# Patient Record
Sex: FEMALE | Race: WHITE | NOT HISPANIC OR LATINO | Employment: FULL TIME | URBAN - METROPOLITAN AREA
[De-identification: names, ages, dates, MRNs, and addresses within clinical notes are randomized per-mention and may not be internally consistent; named-entity substitution may affect disease eponyms.]

---

## 2021-12-03 ENCOUNTER — OFFICE VISIT (OUTPATIENT)
Dept: OBGYN CLINIC | Facility: CLINIC | Age: 31
End: 2021-12-03

## 2021-12-03 VITALS
DIASTOLIC BLOOD PRESSURE: 62 MMHG | BODY MASS INDEX: 24.48 KG/M2 | SYSTOLIC BLOOD PRESSURE: 118 MMHG | HEIGHT: 70 IN | WEIGHT: 171 LBS

## 2021-12-03 DIAGNOSIS — Z87.410 HISTORY OF CERVICAL DYSPLASIA: ICD-10-CM

## 2021-12-03 DIAGNOSIS — Z33.1 INCIDENTAL PREGNANCY: Primary | ICD-10-CM

## 2021-12-03 DIAGNOSIS — N91.2 AMENORRHEA: ICD-10-CM

## 2021-12-03 LAB — SL AMB POCT URINE HCG: POSITIVE

## 2021-12-03 PROCEDURE — 81025 URINE PREGNANCY TEST: CPT | Performed by: OBSTETRICS & GYNECOLOGY

## 2021-12-03 PROCEDURE — 99203 OFFICE O/P NEW LOW 30 MIN: CPT | Performed by: OBSTETRICS & GYNECOLOGY

## 2021-12-20 ENCOUNTER — INITIAL PRENATAL (OUTPATIENT)
Dept: OBGYN CLINIC | Facility: CLINIC | Age: 31
End: 2021-12-20
Payer: COMMERCIAL

## 2021-12-20 ENCOUNTER — ULTRASOUND (OUTPATIENT)
Dept: OBGYN CLINIC | Facility: CLINIC | Age: 31
End: 2021-12-20
Payer: COMMERCIAL

## 2021-12-20 DIAGNOSIS — Z36.9 ANTENATAL SCREENING ENCOUNTER: Primary | ICD-10-CM

## 2021-12-20 DIAGNOSIS — Z34.01 ENCOUNTER FOR SUPERVISION OF NORMAL FIRST PREGNANCY IN FIRST TRIMESTER: Primary | ICD-10-CM

## 2021-12-20 PROCEDURE — OBC: Performed by: OBSTETRICS & GYNECOLOGY

## 2021-12-20 PROCEDURE — 76817 TRANSVAGINAL US OBSTETRIC: CPT | Performed by: OBSTETRICS & GYNECOLOGY

## 2021-12-30 ENCOUNTER — LAB (OUTPATIENT)
Dept: LAB | Facility: HOSPITAL | Age: 31
End: 2021-12-30
Attending: OBSTETRICS & GYNECOLOGY
Payer: COMMERCIAL

## 2021-12-30 DIAGNOSIS — N91.2 AMENORRHEA: ICD-10-CM

## 2021-12-30 DIAGNOSIS — Z33.1 INCIDENTAL PREGNANCY: ICD-10-CM

## 2021-12-30 LAB
ABO GROUP BLD: NORMAL
B-HCG SERPL-ACNC: ABNORMAL MIU/ML
BASOPHILS # BLD AUTO: 0.03 THOUSANDS/ΜL (ref 0–0.1)
BASOPHILS NFR BLD AUTO: 0 % (ref 0–1)
BLD GP AB SCN SERPL QL: NEGATIVE
EOSINOPHIL # BLD AUTO: 0.08 THOUSAND/ΜL (ref 0–0.61)
EOSINOPHIL NFR BLD AUTO: 1 % (ref 0–6)
ERYTHROCYTE [DISTWIDTH] IN BLOOD BY AUTOMATED COUNT: 11 % (ref 11.6–15.1)
HCT VFR BLD AUTO: 44.6 % (ref 34.8–46.1)
HGB BLD-MCNC: 14.7 G/DL (ref 11.5–15.4)
IMM GRANULOCYTES # BLD AUTO: 0.01 THOUSAND/UL (ref 0–0.2)
IMM GRANULOCYTES NFR BLD AUTO: 0 % (ref 0–2)
LYMPHOCYTES # BLD AUTO: 2.11 THOUSANDS/ΜL (ref 0.6–4.47)
LYMPHOCYTES NFR BLD AUTO: 23 % (ref 14–44)
MCH RBC QN AUTO: 30 PG (ref 26.8–34.3)
MCHC RBC AUTO-ENTMCNC: 33 G/DL (ref 31.4–37.4)
MCV RBC AUTO: 91 FL (ref 82–98)
MONOCYTES # BLD AUTO: 0.5 THOUSAND/ΜL (ref 0.17–1.22)
MONOCYTES NFR BLD AUTO: 6 % (ref 4–12)
NEUTROPHILS # BLD AUTO: 6.27 THOUSANDS/ΜL (ref 1.85–7.62)
NEUTS SEG NFR BLD AUTO: 70 % (ref 43–75)
NRBC BLD AUTO-RTO: 0 /100 WBCS
PLATELET # BLD AUTO: 272 THOUSANDS/UL (ref 149–390)
PMV BLD AUTO: 10 FL (ref 8.9–12.7)
RBC # BLD AUTO: 4.9 MILLION/UL (ref 3.81–5.12)
RH BLD: NEGATIVE
SPECIMEN EXPIRATION DATE: NORMAL
TSH SERPL DL<=0.05 MIU/L-ACNC: 2.43 UIU/ML (ref 0.36–3.74)
WBC # BLD AUTO: 9 THOUSAND/UL (ref 4.31–10.16)

## 2021-12-30 PROCEDURE — 86803 HEPATITIS C AB TEST: CPT

## 2021-12-30 PROCEDURE — 87086 URINE CULTURE/COLONY COUNT: CPT | Performed by: OBSTETRICS & GYNECOLOGY

## 2021-12-30 PROCEDURE — 84702 CHORIONIC GONADOTROPIN TEST: CPT

## 2021-12-30 PROCEDURE — 80081 OBSTETRIC PANEL INC HIV TSTG: CPT

## 2021-12-30 PROCEDURE — 36415 COLL VENOUS BLD VENIPUNCTURE: CPT

## 2021-12-30 PROCEDURE — 84443 ASSAY THYROID STIM HORMONE: CPT

## 2021-12-31 LAB
HBV SURFACE AG SER QL: NORMAL
HCV AB SER QL: NORMAL
HIV 1+2 AB+HIV1 P24 AG SERPL QL IA: NORMAL
RPR SER QL: NORMAL
RUBV IGG SERPL IA-ACNC: 26.6 IU/ML

## 2022-01-01 LAB — BACTERIA UR CULT: NORMAL

## 2022-01-12 ENCOUNTER — INITIAL PRENATAL (OUTPATIENT)
Dept: OBGYN CLINIC | Facility: CLINIC | Age: 32
End: 2022-01-12

## 2022-01-12 VITALS
SYSTOLIC BLOOD PRESSURE: 118 MMHG | DIASTOLIC BLOOD PRESSURE: 80 MMHG | HEART RATE: 87 BPM | BODY MASS INDEX: 24.82 KG/M2 | WEIGHT: 173 LBS

## 2022-01-12 DIAGNOSIS — Z34.91 FIRST TRIMESTER PREGNANCY: Primary | ICD-10-CM

## 2022-01-12 DIAGNOSIS — Z3A.11 11 WEEKS GESTATION OF PREGNANCY: ICD-10-CM

## 2022-01-12 LAB
SL AMB  POCT GLUCOSE, UA: NORMAL
SL AMB POCT URINE PROTEIN: NORMAL

## 2022-01-12 PROCEDURE — 87491 CHLMYD TRACH DNA AMP PROBE: CPT | Performed by: OBSTETRICS & GYNECOLOGY

## 2022-01-12 PROCEDURE — 87591 N.GONORRHOEAE DNA AMP PROB: CPT | Performed by: OBSTETRICS & GYNECOLOGY

## 2022-01-12 PROCEDURE — G0476 HPV COMBO ASSAY CA SCREEN: HCPCS | Performed by: OBSTETRICS & GYNECOLOGY

## 2022-01-12 PROCEDURE — PNV: Performed by: OBSTETRICS & GYNECOLOGY

## 2022-01-12 PROCEDURE — G0145 SCR C/V CYTO,THINLAYER,RESCR: HCPCS | Performed by: OBSTETRICS & GYNECOLOGY

## 2022-01-12 NOTE — PATIENT INSTRUCTIONS
Pregnancy at [address] to [address] [address]:   What changes are happening in my body? You are now at the end of your first trimester and entering your second trimester  Morning sickness usually goes away by this time  You may have other symptoms such as fatigue, frequent urination, and headaches  You may have gained 2 to 4 pounds by now  How do I care for myself at this stage of my pregnancy? · Get plenty of rest   You may feel more tired than normal  You may need to take naps or go to bed earlier  · Manage nausea and vomiting  Avoid fatty and spicy foods  Eat small meals throughout the day instead of large meals  Della may help to decrease nausea  Ask your healthcare provider about other ways of decreasing nausea and vomiting  · Eat a variety of healthy foods  Healthy foods include fruits, vegetables, whole-grain breads, low-fat dairy foods, beans, lean meats, and fish  Drink liquids as directed  Ask how much liquid to drink each day and which liquids are best for you  Limit caffeine to less than 200 milligrams each day  Limit your intake of fish to 2 servings each week  Choose fish low in mercury such as canned light tuna, shrimp, salmon, cod, or tilapia  Do not  eat fish high in mercury such as swordfish, tilefish, pro mackerel, and shark  · Take prenatal vitamins as directed  Your need for certain vitamins and minerals, such as folic acid, increases during pregnancy  Prenatal vitamins provide some of the extra vitamins and minerals you need  Prenatal vitamins may also help to decrease the risk of certain birth defects  · Do not smoke  Smoking increases your risk of a miscarriage and other health problems during your pregnancy  Smoking can cause your baby to be born too early or weigh less at birth  Ask your healthcare provider for information if you need help quitting  · Do not drink alcohol  Alcohol passes from your body to your baby through the placenta   It can affect your baby's brain development and cause fetal alcohol syndrome (FAS)  FAS is a group of conditions that causes mental, behavior, and growth problems  · Talk to your healthcare provider before you take any medicines  Many medicines may harm your baby if you take them when you are pregnant  Do not take any medicines, vitamins, herbs, or supplements without first talking to your healthcare provider  Never use illegal or street drugs (such as marijuana or cocaine) while you are pregnant  What are some safety tips during pregnancy? · Avoid hot tubs and saunas  Do not use a hot tub or sauna while you are pregnant, especially during your first trimester  Hot tubs and saunas may raise your baby's temperature and increase the risk of birth defects  · Avoid toxoplasmosis  This is an infection caused by eating raw meat or being around infected cat feces  It can cause birth defects, miscarriages, and other problems  Wash your hands after you touch raw meat  Make sure any meat is well-cooked before you eat it  Avoid raw eggs and unpasteurized milk  Use gloves or ask someone else to clean your cat's litter box while you are pregnant  What changes are happening with my baby? Your baby has fully formed fingernails and toenails  Your baby's heartbeat can now be heard  Ask your healthcare provider if you can listen to your baby's heartbeat  By week 14, your baby is over 4 inches long from the top of the head to the rump (baby's bottom)  Your baby weighs over 3 ounces  What do I need to know about prenatal care? Prenatal care is a series of visits with your healthcare provider throughout your pregnancy  During the first 28 weeks of your pregnancy, you will see your healthcare provider 1 time each month  Prenatal care can help prevent problems during pregnancy and childbirth  Your healthcare provider will check your blood pressure and weight  Your baby's heart rate will also be checked   You may also need the following at some visits:  · A pelvic exam  allows your healthcare provider to see your cervix (the bottom part of your uterus)  Your healthcare provider will use a speculum to open your vagina  He or she will check the size and shape of your uterus  · Blood tests  may be done to check for any of the following:    ? Gestational diabetes or anemia (low iron level)    ? Blood type or Rh factor, or certain birth defects    ? Immunity to certain diseases, such as chickenpox or rubella    ? An infection, such as a sexually transmitted infection, HIV, or hepatitis B    · Hepatitis B  may need to be prevented or treated  Hepatitis B is inflammation of the liver caused by the hepatitis B virus (HBV)  HBV can spread from a mother to her baby during delivery  You will be checked for HBV as early as possible in the first trimester of each pregnancy  You need the test even if you received the hepatitis B vaccine or were tested before  You may need to have an HBV infection treated before you give birth  · Urine tests  may also be done to check for sugar and protein  These can be signs of gestational diabetes or preeclampsia  Urine tests may also be done to check for signs of infection  · A fetal ultrasound  shows pictures of your baby inside your uterus  The pictures are used to check your baby's development, movement, and position  · Genetic disorder screening tests  may be offered to you  These tests check your baby's risk for genetic disorders such as Down syndrome  A screening test includes a blood test and ultrasound  When should I seek immediate care? · You have pain or cramping in your abdomen or low back  · You have heavy vaginal bleeding or clotting  · You pass material that looks like tissue or large clots  Collect the material and bring it with you  When should I call my doctor or obstetrician? · You cannot keep food or drinks down, and you are losing weight      · You have light bleeding  · You have chills or a fever  · You have vaginal itching, burning, or pain  · You have yellow, green, white, or foul-smelling vaginal discharge  · You have pain or burning when you urinate, less urine than usual, or pink or bloody urine  · You have questions or concerns about your condition or care  CARE AGREEMENT:   You have the right to help plan your care  Learn about your health condition and how it may be treated  Discuss treatment options with your healthcare providers to decide what care you want to receive  You always have the right to refuse treatment  The above information is an  only  It is not intended as medical advice for individual conditions or treatments  Talk to your doctor, nurse or pharmacist before following any medical regimen to see if it is safe and effective for you  © Copyright [employer] 2021 Information is for End User's use only and may not be sold, redistributed or otherwise used for commercial purposes   All illustrations and images included in CareNotes® are the copyrighted property of A D A M , Inc  or [address] [place] [address]

## 2022-01-12 NOTE — PROGRESS NOTES
Patient was seen today for 1st prenatal visit  1  1st prenatal visit-faint fetal heart tones were noted  Pap with HPV and GC/chlamydia done today  Status post flu shot previously  Had COVID booster on 1/7/22  Follow-up 4 weeks time prenatal visit or as needed  Has MFM appointment on 1/21/22  2  Previous history of cervical dysplasia-[facility]  Pap with HPV done today with disposition as per findings  3  Rh negative-patient counseled about this  RhoGAM at 28 weeks   is O-positive blood type  4  History of EAB-no current concerns  5  Other-father baby was 10 lb 11 oz at birth  6  Nausea-had some at around 6-8 weeks time, then resolved  About 2 or 3 days ago, had resumption of findings with episode of vomiting  Practical recommendations were reviewed  Vitamin B6 and doxylamine were discussed  Should she have worsening symptoms, to call back  Declines any need for Reglan or Zofran at this time

## 2022-01-16 LAB
HPV HR 12 DNA CVX QL NAA+PROBE: NEGATIVE
HPV16 DNA CVX QL NAA+PROBE: NEGATIVE
HPV18 DNA CVX QL NAA+PROBE: NEGATIVE

## 2022-01-17 LAB
C TRACH DNA SPEC QL NAA+PROBE: NEGATIVE
N GONORRHOEA DNA SPEC QL NAA+PROBE: NEGATIVE

## 2022-01-19 LAB
LAB AP GYN PRIMARY INTERPRETATION: NORMAL
Lab: NORMAL
PATH INTERP SPEC-IMP: NORMAL

## 2022-01-21 ENCOUNTER — APPOINTMENT (OUTPATIENT)
Dept: LAB | Facility: CLINIC | Age: 32
End: 2022-01-21
Payer: COMMERCIAL

## 2022-01-21 ENCOUNTER — ROUTINE PRENATAL (OUTPATIENT)
Dept: PERINATAL CARE | Facility: OTHER | Age: 32
End: 2022-01-21
Payer: COMMERCIAL

## 2022-01-21 VITALS
HEIGHT: 70 IN | SYSTOLIC BLOOD PRESSURE: 116 MMHG | DIASTOLIC BLOOD PRESSURE: 78 MMHG | WEIGHT: 171.2 LBS | HEART RATE: 84 BPM | BODY MASS INDEX: 24.51 KG/M2

## 2022-01-21 DIAGNOSIS — Z36.9 ANTENATAL SCREENING ENCOUNTER: ICD-10-CM

## 2022-01-21 DIAGNOSIS — Z36.9 UNSPECIFIED ANTENATAL SCREENING: ICD-10-CM

## 2022-01-21 DIAGNOSIS — Z33.1 PREGNANT STATE, INCIDENTAL: ICD-10-CM

## 2022-01-21 DIAGNOSIS — Z36.82 ENCOUNTER FOR ANTENATAL SCREENING FOR NUCHAL TRANSLUCENCY: Primary | ICD-10-CM

## 2022-01-21 LAB — MISCELLANEOUS LAB TEST RESULT: NORMAL

## 2022-01-21 PROCEDURE — 76813 OB US NUCHAL MEAS 1 GEST: CPT | Performed by: OBSTETRICS & GYNECOLOGY

## 2022-01-21 PROCEDURE — 36415 COLL VENOUS BLD VENIPUNCTURE: CPT

## 2022-01-21 PROCEDURE — 99242 OFF/OP CONSLTJ NEW/EST SF 20: CPT | Performed by: OBSTETRICS & GYNECOLOGY

## 2022-01-21 NOTE — PROGRESS NOTES
Patient chose to have [address] screen  Instructed patient on process for checking her OOP cost via [employer] [facility]  Provided [id number] instruction card toll free # [phone]  Patient made aware if [id number]  unable to give an estimate she will need to contact M office prior to blood draw  Patient aware that  is provided by third party and is only an estimate of cost not a guarantee  Insurance may require prior authorization, if test drawn without prior authorization she will be responsible for full cost of test   For definitive OOP cost, lab deductible or if lab authorization is required patient encouraged to call her insurance provider  Explained customer service insurance phone # located on the back of her ID card  Maternal Fetal Medicine will have results in approximately 7-10 business days and will call patient or notify via [address]  Patient aware viewing lab result online will reveal gender  [id number] lab kit with prepaid [employer]  given to patient  Patient verbalized understanding of all instructions and no questions at this time

## 2022-01-21 NOTE — PATIENT INSTRUCTIONS
Thank you for choosing us for your  care today  If you have any questions about your ultrasound or care, please do not hesitate to contact us or your primary obstetrician  Some general instructions for your pregnancy are:     Protect against coronavirus: get vaccinated and mask up  Pregnant women are increased risk of severe COVID  Notify your primary care doctor if you have any symptoms including cough, shortness of breath or difficulty breathing, fever, chills, muscle pain, sore throat, or loss of taste or smell   Exercise: Aim for 22 minutes per day (150 minutes per week) of regular exercise  Walking is great!  Nutrition: aim for calcium-rich and iron-rich foods as well as healthy sources of protein   Learn about Preeclampsia: preeclampsia is a common, serious high blood pressure complication in pregnancy  A blood pressure of 909MAMX (systolic or top number) or 50HWGN (diastolic or bottom number) is not normal and needs evaluation by your doctor  Aspirin is sometimes prescribed in early pregnancy to prevent preeclampsia in women with risk factors - ask your obstetrician if you should be on this medication   If you smoke, try to reduce how many cigarettes you smoke or try to quit completely  Do not vape   Other warning signs to watch out for in pregnancy or postpartum: chest pain, obstructed breathing or shortness of breath, seizures, thoughts of hurting yourself or your baby, bleeding, a painful or swollen leg, fever, or headache (see [employer]WH[employer] POST-BIRTH Warning Signs campaign)  If these happen call 911  Itching is also not normal in pregnancy and if you experience this, especially over your hands and feet, potentially worse at night, notify your doctors  This QR code below links to additional information from [employer]G on preeclampsia  There is also more information at [employer] cy (Preeclampsia Foundation)

## 2022-01-21 NOTE — LETTER
2022    Shamika Guajardo DO  [address] [facility] [address]    Patient: Cynthia Amaro   YOB: 1990   Date of Visit: 2022   Gestational age Dawn Anderson of this communication: Routine       Dear Ramu Ayala,    This patient was seen recently in our  office  The content of my evaluation today is in the ultrasound report under "OB Procedures" tab  Please don't hesitate to contact our office with any concerns or questions       Sincerely,      Kyle Davenport MD  Attending Physician, Namita

## 2022-01-27 ENCOUNTER — TELEPHONE (OUTPATIENT)
Dept: PERINATAL CARE | Facility: OTHER | Age: 32
End: 2022-01-27

## 2022-01-27 NOTE — TELEPHONE ENCOUNTER
Spoke with pt and provided her with the results of the [address], gender not provided  Pt instructed on MSAFP being ordered by OB and timing of test  Pt receptive to information and declines questions at this time  Pt will come to [address] on 1/28/22 at 0900 to  Gender card

## 2022-01-27 NOTE — TELEPHONE ENCOUNTER
----- Message from Griselda Lu, MD sent at 2022 10:23 AM EST -----  Hi  RN staff, I've reviewed this NIPT result which shows low residual risk for the conditions tested (trisomies 13, 18, and 21, and sex chromosome abnormality), can you call her to inform her of this result if she has not viewed her result via [employer]? Her OB office is to order the MSAFP  and I sent her a edPULSE message as an FYI  Thank you    Griselda Lu, MD

## 2022-02-10 ENCOUNTER — ROUTINE PRENATAL (OUTPATIENT)
Dept: OBGYN CLINIC | Facility: CLINIC | Age: 32
End: 2022-02-10

## 2022-02-10 VITALS
SYSTOLIC BLOOD PRESSURE: 110 MMHG | WEIGHT: 173.6 LBS | HEART RATE: 61 BPM | BODY MASS INDEX: 24.91 KG/M2 | DIASTOLIC BLOOD PRESSURE: 76 MMHG

## 2022-02-10 DIAGNOSIS — Z3A.15 15 WEEKS GESTATION OF PREGNANCY: Primary | ICD-10-CM

## 2022-02-10 DIAGNOSIS — Z34.92 PREGNANT AND NOT YET DELIVERED IN SECOND TRIMESTER: ICD-10-CM

## 2022-02-10 LAB
SL AMB  POCT GLUCOSE, UA: NORMAL
SL AMB POCT URINE PROTEIN: NORMAL

## 2022-02-10 PROCEDURE — PNV: Performed by: OBSTETRICS & GYNECOLOGY

## 2022-02-10 NOTE — PROGRESS NOTES
IUP at 15 weeks and 2 days  Patient is not yet sensed fetal movement  Her blood pressure is normal her weight gain is appropriate her urine dip is negative negative  She is scheduled for level 2 ultrasound in the upcoming weeks  Alpha fetoprotein testing has been ordered she will get that done within the next 3-4 weeks  She and her  have both been vaccinated for COVID-19 and also received a booster shot  Currently she is feeling well  She no longer having nausea vomiting symptoms continue routine prenatal care follow-up here in 4 weeks and p r n

## 2022-03-09 ENCOUNTER — APPOINTMENT (OUTPATIENT)
Dept: LAB | Facility: CLINIC | Age: 32
End: 2022-03-09
Payer: COMMERCIAL

## 2022-03-09 DIAGNOSIS — Z3A.15 15 WEEKS GESTATION OF PREGNANCY: ICD-10-CM

## 2022-03-09 PROCEDURE — 36415 COLL VENOUS BLD VENIPUNCTURE: CPT

## 2022-03-09 PROCEDURE — 82105 ALPHA-FETOPROTEIN SERUM: CPT

## 2022-03-12 LAB
2ND TRIMESTER 4 SCREEN SERPL-IMP: NORMAL
AFP ADJ MOM SERPL: 1.17
AFP INTERP AMN-IMP: NORMAL
AFP INTERP SERPL-IMP: NORMAL
AFP INTERP SERPL-IMP: NORMAL
AFP SERPL-MCNC: 54 NG/ML
AGE AT DELIVERY: 33.2 YR
GA METHOD: NORMAL
GA: 19.1 WEEKS
IDDM PATIENT QL: NO
MULTIPLE PREGNANCY: NO
NEURAL TUBE DEFECT RISK FETUS: 7137 %

## 2022-03-14 ENCOUNTER — ROUTINE PRENATAL (OUTPATIENT)
Dept: OBGYN CLINIC | Facility: CLINIC | Age: 32
End: 2022-03-14

## 2022-03-14 VITALS
DIASTOLIC BLOOD PRESSURE: 76 MMHG | BODY MASS INDEX: 25.57 KG/M2 | SYSTOLIC BLOOD PRESSURE: 120 MMHG | WEIGHT: 178.2 LBS | HEART RATE: 77 BPM

## 2022-03-14 DIAGNOSIS — Z34.92 PREGNANT AND NOT YET DELIVERED IN SECOND TRIMESTER: Primary | ICD-10-CM

## 2022-03-14 DIAGNOSIS — Z3A.20 20 WEEKS GESTATION OF PREGNANCY: ICD-10-CM

## 2022-03-14 LAB
SL AMB  POCT GLUCOSE, UA: NORMAL
SL AMB POCT URINE PROTEIN: NORMAL

## 2022-03-14 PROCEDURE — PNV: Performed by: OBSTETRICS & GYNECOLOGY

## 2022-03-14 NOTE — PROGRESS NOTES
IUP at 19 weeks and 6 days  Patient is not quite sensed fetal movement  She is otherwise doing well no signs of nausea adequate weight gain approximately 5 lb her blood pressure is normal her urine dip is negative negative  She does have a follow-up ultrasound 20 week level to scheduled later this week at [facility] [address]  Alpha fetoprotein was done and was normal  Reviewed signs of  labor and kick counts follow-up here in 4 weeks and p r n  all questions answered

## 2022-03-18 ENCOUNTER — ROUTINE PRENATAL (OUTPATIENT)
Dept: PERINATAL CARE | Facility: OTHER | Age: 32
End: 2022-03-18
Payer: COMMERCIAL

## 2022-03-18 VITALS
WEIGHT: 182.2 LBS | SYSTOLIC BLOOD PRESSURE: 120 MMHG | BODY MASS INDEX: 26.08 KG/M2 | DIASTOLIC BLOOD PRESSURE: 68 MMHG | HEART RATE: 64 BPM | HEIGHT: 70 IN

## 2022-03-18 DIAGNOSIS — Z36.89 ENCOUNTER FOR FETAL ANATOMIC SURVEY: ICD-10-CM

## 2022-03-18 DIAGNOSIS — Z3A.20 20 WEEKS GESTATION OF PREGNANCY: Primary | ICD-10-CM

## 2022-03-18 DIAGNOSIS — Z36.86 ENCOUNTER FOR ANTENATAL SCREENING FOR CERVICAL LENGTH: ICD-10-CM

## 2022-03-18 PROCEDURE — 76817 TRANSVAGINAL US OBSTETRIC: CPT | Performed by: OBSTETRICS & GYNECOLOGY

## 2022-03-18 PROCEDURE — 76805 OB US >/= 14 WKS SNGL FETUS: CPT | Performed by: OBSTETRICS & GYNECOLOGY

## 2022-03-18 NOTE — PATIENT INSTRUCTIONS
Please refer to " Ultrasound" under test results in MyChart for today's ultrasound findings  Congratulations, and best wishes for a healthy remainder of the pregnancy! Thank you for choosing [facility] for your visit today  We appreciate your trust and the opportunity to assist your obstetrician with your care  We value your feedback regarding the care we are providing  Following today's appointment, you may receive a patient satisfaction survey by mail or e-mail requesting feedback on your visit  We ask that you complete the survey to  help us understand how we are doing  Thank you for in advance for your feedback

## 2022-03-18 NOTE — PROGRESS NOTES
[id number] [facility]: Ms Quikc was seen today at 20w3d for anatomic survey and cervical length screening ultrasound  See ultrasound report under "OB Procedures" tab    Please don't hesitate to contact our office with any concerns or questions   -Francesca Lawson MD

## 2022-03-18 NOTE — PROGRESS NOTES
Ultrasound Probe Disinfection    A transvaginal ultrasound was performed  Prior to use, disinfection was performed with High Level Disinfection Process (Trophon)  Probe serial number [id number]: [id number] was used        Sha Smith  03/18/22  10:57 AM

## 2022-03-18 NOTE — LETTER
2022     Loren Conway MD  [address] [facility]    Patient: Nadia Donohue   YOB: 1990   Date of Visit: 3/18/2022       Dear Dr Daly : Thank you for referring Nadia Donohue to me for evaluation  Below are my notes for this consultation  If you have questions, please do not hesitate to call me  I look forward to following your patient along with you  Sincerely,        Ernst Hammond MD        CC: No Recipients  Ernst Hammond MD  3/18/2022 11:22 AM  Sign when Signing Visit  [id number] [facility]: Ms Khris Reynoso was seen today at 20w3d for anatomic survey and cervical length screening ultrasound  See ultrasound report under "OB Procedures" tab    Please don't hesitate to contact our office with any concerns or questions   -Ernst Hammond MD

## 2022-04-06 ENCOUNTER — ROUTINE PRENATAL (OUTPATIENT)
Dept: OBGYN CLINIC | Facility: CLINIC | Age: 32
End: 2022-04-06

## 2022-04-06 VITALS — BODY MASS INDEX: 26.03 KG/M2 | WEIGHT: 181.4 LBS | DIASTOLIC BLOOD PRESSURE: 74 MMHG | SYSTOLIC BLOOD PRESSURE: 124 MMHG

## 2022-04-06 DIAGNOSIS — Z3A.23 23 WEEKS GESTATION OF PREGNANCY: ICD-10-CM

## 2022-04-06 DIAGNOSIS — Z34.92 PREGNANT AND NOT YET DELIVERED IN SECOND TRIMESTER: Primary | ICD-10-CM

## 2022-04-06 LAB
SL AMB  POCT GLUCOSE, UA: NORMAL
SL AMB POCT URINE PROTEIN: NORMAL

## 2022-04-06 PROCEDURE — PNV: Performed by: OBSTETRICS & GYNECOLOGY

## 2022-04-06 NOTE — Clinical Note
Darrian Dunbar,  Counseled patient about Ob plans after 7/1/22  They seem interested in going to [facility] , delivery at [facility]  Plan to continue OB care here through 6/30/22 visit    Thanks, James Carpio MD

## 2022-04-06 NOTE — PATIENT INSTRUCTIONS
Pregnancy at 23 to [address]:   What changes are happening in my body? You are now close to or at the beginning of the third trimester  The third trimester starts at 24 weeks and ends with delivery  As your baby gets larger, you may develop certain symptoms  These may include pain in your back or down the sides of your abdomen  You may also have stretch marks on your abdomen, breasts, thighs, or buttocks  You may also have constipation  How do I care for myself at this stage of my pregnancy? · Eat a variety of healthy foods  Healthy foods include fruits, vegetables, whole-grain breads, low-fat dairy foods, beans, lean meats, and fish  Drink liquids as directed  Ask how much liquid to drink each day and which liquids are best for you  Limit caffeine to less than 200 milligrams each day  Limit your intake of fish to 2 servings each week  Choose fish low in mercury such as canned light tuna, shrimp, salmon, cod, or tilapia  Do not  eat fish high in mercury such as swordfish, tilefish, pro mackerel, and shark  · Manage back pain  Do not stand for long periods of time or lift heavy items  Use good posture while you stand, squat, or bend  Wear low-heeled shoes with good support  Rest may also help to relieve back pain  Ask your healthcare provider about exercises you can do to strengthen your back muscles  · Take prenatal vitamins as directed  Your need for certain vitamins and minerals, such as folic acid, increases during pregnancy  Prenatal vitamins provide some of the extra vitamins and minerals you need  Prenatal vitamins may also help to decrease the risk of certain birth defects  · Talk to your healthcare provider about exercise  Moderate exercise can help you stay fit  Your healthcare provider will help you plan an exercise program that is safe for you during pregnancy  · Do not smoke    Smoking increases your risk of a miscarriage and other health problems during your pregnancy  Smoking can cause your baby to be born too early or weigh less at birth  Ask your healthcare provider for information if you need help quitting  · Do not drink alcohol  Alcohol passes from your body to your baby through the placenta  It can affect your baby's brain development and cause fetal alcohol syndrome (FAS)  FAS is a group of conditions that causes mental, behavior, and growth problems  · Talk to your healthcare provider before you take any medicines  Many medicines may harm your baby if you take them when you are pregnant  Do not take any medicines, vitamins, herbs, or supplements without first talking to your healthcare provider  Never use illegal or street drugs (such as marijuana or cocaine) while you are pregnant  What are some safety tips during pregnancy? · Avoid hot tubs and saunas  Do not use a hot tub or sauna while you are pregnant, especially during your first trimester  Hot tubs and saunas may raise your baby's temperature and increase the risk of birth defects  · Avoid toxoplasmosis  This is an infection caused by eating raw meat or being around infected cat feces  It can cause birth defects, miscarriages, and other problems  Wash your hands after you touch raw meat  Make sure any meat is well-cooked before you eat it  Avoid raw eggs and unpasteurized milk  Use gloves or ask someone else to clean your cat's litter box while you are pregnant  What changes are happening with my baby? By 26 weeks, your baby will weigh about 2 pounds  Your baby will be about 10 inches long from the top of the head to the rump (baby's bottom)  Your baby's movements are much stronger now  Your baby's eyes are almost completely formed and can partially open  Your baby also sleeps and wakes up  What do I need to know about prenatal care? Your healthcare provider will check your blood pressure and weight   You may also need the following:  · A urine test  may also be done to check for sugar and protein  These can be signs of gestational diabetes or infection  Protein in your urine may also be a sign of preeclampsia  Preeclampsia is a condition that can develop during week 20 or later of your pregnancy  It causes high blood pressure, and it can cause problems with your kidneys and other organs  · A gestational diabetes screen  may be done  Your healthcare provider may order either a 1-step or 2-step oral glucose tolerance test (OGTT)  ? 1-step OGTT:  Your blood sugar level will be tested after you have not eaten for 8 hours (fasting)  You will then be given a glucose drink  Your level will be tested again 1 hour and 2 hours after you finish the drink  ? 2-step OGTT:  You do not have to fast for the first part of the test  You will have the glucose drink at any time of day  Your blood sugar level will be checked 1 hour later  If your blood sugar is higher than a certain level, another test will be ordered  You will fast and your blood sugar level will be tested  You will have the glucose drink  Your blood will be tested again 1 hour, 2 hours, and 3 hours after you finish the glucose drink  · Fundal height  is a measurement of your uterus to check your baby's growth  This number is usually the same as the number of weeks that you have been pregnant  · Your baby's heart rate  will be checked  When should I seek immediate care? · You develop a severe headache that does not go away  · You have new or increased vision changes, such as blurred or spotted vision  · You have new or increased swelling in your face or hands  · You have vaginal spotting or bleeding  · Your water broke or you feel warm water gushing or trickling from your vagina  When should I call my doctor or obstetrician? · You have abdominal cramps, pressure, or tightening  · You have a change in vaginal discharge  · You have light bleeding      · You have chills or a fever     · You have vaginal itching, burning, or pain  · You have yellow, green, white, or foul-smelling vaginal discharge  · You have pain or burning when you urinate, less urine than usual, or pink or bloody urine  · You have questions or concerns about your condition or care  CARE AGREEMENT:   You have the right to help plan your care  Learn about your health condition and how it may be treated  Discuss treatment options with your healthcare providers to decide what care you want to receive  You always have the right to refuse treatment  The above information is an  only  It is not intended as medical advice for individual conditions or treatments  Talk to your doctor, nurse or pharmacist before following any medical regimen to see if it is safe and effective for you  © Copyright [employer] 2022 Information is for End User's use only and may not be sold, redistributed or otherwise used for commercial purposes   All illustrations and images included in CareNotes® are the copyrighted property of A D A [employer]  or [address] [place] [address]

## 2022-04-06 NOTE — PROGRESS NOTES
Patient was seen today for prenatal visit  1  23 1 weeks-good fetal movement noted  Fetal movement and  labor precautions were reviewed  Laboratory sheet given for Glucola and labs along with type and screen  Follow-up 4 weeks time prenatal visit  2  Previous history of cervical dysplasia-Pap with HPV was negative from 22  3  Rh negative-patient and partner counseled about this  RhoGAM recommended at 28 weeks   is O positive blood type  4  History of EAB  5  Other-father baby was 10 lb 11 oz at birth  10  Nausea-no current concerns  7  Other-MFM ultrasound 3/18/22 with good results, no need for follow-up recommended  8  Other-aware of OB plans after 22

## 2022-04-12 ENCOUNTER — APPOINTMENT (OUTPATIENT)
Dept: LAB | Facility: HOSPITAL | Age: 32
End: 2022-04-12
Attending: OBSTETRICS & GYNECOLOGY
Payer: COMMERCIAL

## 2022-04-12 DIAGNOSIS — Z34.92 PREGNANT AND NOT YET DELIVERED IN SECOND TRIMESTER: ICD-10-CM

## 2022-04-12 PROBLEM — Z67.91 RH NEGATIVE STATE IN ANTEPARTUM PERIOD: Status: ACTIVE | Noted: 2022-04-12

## 2022-04-12 PROBLEM — O26.899 RH NEGATIVE STATE IN ANTEPARTUM PERIOD: Status: ACTIVE | Noted: 2022-04-12

## 2022-04-12 LAB
ABO GROUP BLD: NORMAL
BASOPHILS # BLD AUTO: 0.03 THOUSANDS/ΜL (ref 0–0.1)
BASOPHILS NFR BLD AUTO: 0 % (ref 0–1)
BLD GP AB SCN SERPL QL: NEGATIVE
EOSINOPHIL # BLD AUTO: 0.05 THOUSAND/ΜL (ref 0–0.61)
EOSINOPHIL NFR BLD AUTO: 1 % (ref 0–6)
ERYTHROCYTE [DISTWIDTH] IN BLOOD BY AUTOMATED COUNT: 11.9 % (ref 11.6–15.1)
GLUCOSE 1H P 50 G GLC PO SERPL-MCNC: 87 MG/DL (ref 40–134)
HCT VFR BLD AUTO: 38.2 % (ref 34.8–46.1)
HGB BLD-MCNC: 12.7 G/DL (ref 11.5–15.4)
IMM GRANULOCYTES # BLD AUTO: 0.05 THOUSAND/UL (ref 0–0.2)
IMM GRANULOCYTES NFR BLD AUTO: 1 % (ref 0–2)
LYMPHOCYTES # BLD AUTO: 1.44 THOUSANDS/ΜL (ref 0.6–4.47)
LYMPHOCYTES NFR BLD AUTO: 21 % (ref 14–44)
MCH RBC QN AUTO: 30.9 PG (ref 26.8–34.3)
MCHC RBC AUTO-ENTMCNC: 33.2 G/DL (ref 31.4–37.4)
MCV RBC AUTO: 93 FL (ref 82–98)
MONOCYTES # BLD AUTO: 0.43 THOUSAND/ΜL (ref 0.17–1.22)
MONOCYTES NFR BLD AUTO: 6 % (ref 4–12)
NEUTROPHILS # BLD AUTO: 5 THOUSANDS/ΜL (ref 1.85–7.62)
NEUTS SEG NFR BLD AUTO: 71 % (ref 43–75)
NRBC BLD AUTO-RTO: 0 /100 WBCS
PLATELET # BLD AUTO: 223 THOUSANDS/UL (ref 149–390)
PMV BLD AUTO: 9.5 FL (ref 8.9–12.7)
RBC # BLD AUTO: 4.11 MILLION/UL (ref 3.81–5.12)
RH BLD: NEGATIVE
SPECIMEN EXPIRATION DATE: NORMAL
WBC # BLD AUTO: 7 THOUSAND/UL (ref 4.31–10.16)

## 2022-04-12 PROCEDURE — 85025 COMPLETE CBC W/AUTO DIFF WBC: CPT

## 2022-04-12 PROCEDURE — 86901 BLOOD TYPING SEROLOGIC RH(D): CPT

## 2022-04-12 PROCEDURE — 36415 COLL VENOUS BLD VENIPUNCTURE: CPT

## 2022-04-12 PROCEDURE — 82950 GLUCOSE TEST: CPT

## 2022-04-12 PROCEDURE — 86900 BLOOD TYPING SEROLOGIC ABO: CPT

## 2022-04-12 PROCEDURE — 86850 RBC ANTIBODY SCREEN: CPT

## 2022-04-12 PROCEDURE — 86592 SYPHILIS TEST NON-TREP QUAL: CPT

## 2022-04-13 LAB — RPR SER QL: NORMAL

## 2022-05-04 ENCOUNTER — ROUTINE PRENATAL (OUTPATIENT)
Dept: OBGYN CLINIC | Facility: CLINIC | Age: 32
End: 2022-05-04

## 2022-05-04 VITALS
BODY MASS INDEX: 27.28 KG/M2 | DIASTOLIC BLOOD PRESSURE: 64 MMHG | HEIGHT: 69 IN | SYSTOLIC BLOOD PRESSURE: 108 MMHG | WEIGHT: 184.2 LBS

## 2022-05-04 DIAGNOSIS — Z67.91 RH NEGATIVE STATE IN ANTEPARTUM PERIOD: Primary | ICD-10-CM

## 2022-05-04 DIAGNOSIS — O26.899 RH NEGATIVE STATE IN ANTEPARTUM PERIOD: Primary | ICD-10-CM

## 2022-05-04 DIAGNOSIS — Z3A.27 27 WEEKS GESTATION OF PREGNANCY: ICD-10-CM

## 2022-05-04 LAB
SL AMB  POCT GLUCOSE, UA: NEGATIVE
SL AMB POCT URINE PROTEIN: NEGATIVE

## 2022-05-04 PROCEDURE — PNV: Performed by: OBSTETRICS & GYNECOLOGY

## 2022-05-04 NOTE — PROGRESS NOTES
Routine Prenatal Visit  [address] [facility], [address], [facility], [address] [facility]    Assessment/Plan:  Kingsley Becerra is a 34y o  year old  at 27w1d who presents for routine prenatal visit  New transfer to our practice today  1  Rh negative state in antepartum period  Assessment & Plan:  Rh negative  Antibody testing 24 week negative  Will need repeat T&S prior to Rhogam to check has not developed antibodies since 24 weeks  Orders:  -     Type and screen; Future; Expected date: 2022    2  27 weeks gestation of pregnancy  Assessment & Plan:  Patient transferring care from [address] [place] [address] ([facility]) at 27 weeks due to practice not delivering after 2022  Reviewed history and labs - all normal   1hr GTT normal at 24 weeks and normal CBC as well  Discussed need for Rhogam after 28 weeks as well as Adacel vaccine  Pt agrees for both, discussed can get at next visit  Pt c/o leg cramps, recom magnesium supplement  Orders:  -     POCT urine dip        Next OB Visit 2 weeks  Subjective:     CC: Prenatal care    Katy Gutierrez is a 34 y o   female who presents for routine prenatal care at 27w1d  Pregnancy ROS: no leakage of fluid, pelvic pain, or vaginal bleeding  normal fetal movement      The following portions of the patient's history were reviewed and updated as appropriate: allergies, current medications, past family history, past medical history, obstetric history, gynecologic history, past social history, past surgical history and problem list       Objective:  /64   Ht 5' 8 5" (1 74 m)   Wt 83 6 kg (184 lb 3 2 oz)   LMP 10/26/2021   BMI 27 60 kg/m²   Pregravid Weight/BMI: 77 6 kg (171 lb) (BMI 25 62)  Current Weight: 83 6 kg (184 lb 3 2 oz)   Total Weight Gain: 5 987 kg (13 lb 3 2 oz)   Pre- Vitals      Most Recent Value   Prenatal Assessment    Fetal Heart Rate 150   Fundal Height (cm) 27 cm   Movement Present   Prenatal Vitals    Blood Pressure 108/64 Weight - Scale 83 6 kg (184 lb 3 2 oz)   Urine Albumin/Glucose    Dilation/Effacement/Station    Vaginal Drainage    Edema    LLE Edema Trace   RLE Edema Trace   Facial Edema None           General: Well appearing, no distress  Abdomen: Soft, gravid, nontender  Fundal Height: Appropriate for gestational age  Extremities: Warm and well perfused  Non tender

## 2022-05-04 NOTE — ASSESSMENT & PLAN NOTE
Rh negative  Antibody testing 24 week negative  Will need repeat T&S prior to Rhogam to check has not developed antibodies since 24 weeks

## 2022-05-04 NOTE — ASSESSMENT & PLAN NOTE
Patient transferring care from [address] [place] [address] ([facility]) at 27 weeks due to practice not delivering after 6/1/2022  Reviewed history and labs - all normal   1hr GTT normal at 24 weeks and normal CBC as well  Discussed need for Rhogam after 28 weeks as well as Adacel vaccine  Pt agrees for both, discussed can get at next visit  Pt c/o leg cramps, recom magnesium supplement

## 2022-05-23 ENCOUNTER — APPOINTMENT (OUTPATIENT)
Dept: LAB | Facility: HOSPITAL | Age: 32
End: 2022-05-23
Attending: OBSTETRICS & GYNECOLOGY
Payer: COMMERCIAL

## 2022-05-23 ENCOUNTER — ROUTINE PRENATAL (OUTPATIENT)
Dept: OBGYN CLINIC | Facility: CLINIC | Age: 32
End: 2022-05-23
Payer: COMMERCIAL

## 2022-05-23 VITALS
DIASTOLIC BLOOD PRESSURE: 74 MMHG | SYSTOLIC BLOOD PRESSURE: 120 MMHG | BODY MASS INDEX: 26.48 KG/M2 | WEIGHT: 185 LBS | HEIGHT: 70 IN

## 2022-05-23 DIAGNOSIS — O26.899 RH NEGATIVE STATE IN ANTEPARTUM PERIOD: ICD-10-CM

## 2022-05-23 DIAGNOSIS — Z67.91 RH NEGATIVE STATE IN ANTEPARTUM PERIOD: ICD-10-CM

## 2022-05-23 DIAGNOSIS — Z3A.29 29 WEEKS GESTATION OF PREGNANCY: ICD-10-CM

## 2022-05-23 DIAGNOSIS — O26.899 RH NEGATIVE STATE IN ANTEPARTUM PERIOD: Primary | ICD-10-CM

## 2022-05-23 DIAGNOSIS — Z23 NEED FOR TDAP VACCINATION: ICD-10-CM

## 2022-05-23 DIAGNOSIS — Z67.91 RH NEGATIVE STATE IN ANTEPARTUM PERIOD: Primary | ICD-10-CM

## 2022-05-23 LAB
ABO GROUP BLD: NORMAL
BLD GP AB SCN SERPL QL: NEGATIVE
RH BLD: NEGATIVE
SL AMB  POCT GLUCOSE, UA: NORMAL
SL AMB POCT URINE PROTEIN: NORMAL
SPECIMEN EXPIRATION DATE: NORMAL

## 2022-05-23 PROCEDURE — 90715 TDAP VACCINE 7 YRS/> IM: CPT

## 2022-05-23 PROCEDURE — 90471 IMMUNIZATION ADMIN: CPT

## 2022-05-23 PROCEDURE — 86901 BLOOD TYPING SEROLOGIC RH(D): CPT

## 2022-05-23 PROCEDURE — 36415 COLL VENOUS BLD VENIPUNCTURE: CPT

## 2022-05-23 PROCEDURE — PNV: Performed by: OBSTETRICS & GYNECOLOGY

## 2022-05-23 PROCEDURE — 96372 THER/PROPH/DIAG INJ SC/IM: CPT | Performed by: OBSTETRICS & GYNECOLOGY

## 2022-05-23 PROCEDURE — 86900 BLOOD TYPING SEROLOGIC ABO: CPT

## 2022-05-23 PROCEDURE — 86850 RBC ANTIBODY SCREEN: CPT

## 2022-05-23 NOTE — PROGRESS NOTES
Routine Prenatal Visit  [address] [facility], [address], [facility], [address] [facility]    Assessment/Plan:  Dilma Schwarz is a 34y o  year old  at 33w8d who presents for routine prenatal visit  1  Rh negative state in antepartum period  -     Rhogam Immune Globulin Immunization 300 mcg (1500 units)    2  29 weeks gestation of pregnancy  -     POCT urine dip  -     Rhogam Immune Globulin Immunization 300 mcg (1500 units)    3  Need for Tdap vaccination  -     TDAP VACCINE GREATER THAN OR EQUAL TO 6YO IM        Next OB Visit 2 weeks  Subjective:     CC: Prenatal care    Ofelia Harris is a 34 y o   female who presents for routine prenatal care at 33w8d  Pregnancy ROS: no leakage of fluid, pelvic pain, or vaginal bleeding  nml fetal movement  The following portions of the patient's history were reviewed and updated as appropriate: allergies, current medications, past family history, past medical history, obstetric history, gynecologic history, past social history, past surgical history and problem list       Objective:  /74 (BP Location: Left arm, Patient Position: Sitting, Cuff Size: Standard)   Ht 5' 10" (1 778 m)   Wt 83 9 kg (185 lb)   LMP 10/26/2021   BMI 26 54 kg/m²   Pregravid Weight/BMI: 77 6 kg (171 lb) (BMI 24 54)  Current Weight: 83 9 kg (185 lb)   Total Weight Gain: 6 35 kg (14 lb)   Pre- Vitals    Flowsheet Row Most Recent Value   Prenatal Assessment    Fetal Heart Rate 150   Fundal Height (cm) 30 cm   Movement Present   Prenatal Vitals    Blood Pressure 120/74   Weight - Scale 83 9 kg (185 lb)   Urine Albumin/Glucose    Dilation/Effacement/Station    Vaginal Drainage    Draining Fluid No   Edema            General: Well appearing, no distress  Abdomen: Soft, gravid, nontender  Fundal Height: Appropriate for gestational age  Extremities: Warm and well perfused  Non tender

## 2022-05-23 NOTE — PROGRESS NOTES
Rhogam administered left deltoid without difficulty  Paperwork complted and sent to  blood bank via interoffice mail

## 2022-05-24 ENCOUNTER — TELEPHONE (OUTPATIENT)
Dept: OBGYN CLINIC | Facility: CLINIC | Age: 32
End: 2022-05-24

## 2022-06-01 ENCOUNTER — TELEPHONE (OUTPATIENT)
Dept: OBGYN CLINIC | Facility: CLINIC | Age: 32
End: 2022-06-01

## 2022-06-01 NOTE — TELEPHONE ENCOUNTER
Meera called stating she sprained her left ankle/foot today while at the gym  She is asking if that is a concern with her pregnancy  Recommended evaluation of ankle injury at urgent care  Patient in agreement

## 2022-06-07 ENCOUNTER — ROUTINE PRENATAL (OUTPATIENT)
Dept: OBGYN CLINIC | Facility: CLINIC | Age: 32
End: 2022-06-07

## 2022-06-07 VITALS
BODY MASS INDEX: 27.52 KG/M2 | WEIGHT: 185.8 LBS | HEIGHT: 69 IN | SYSTOLIC BLOOD PRESSURE: 100 MMHG | DIASTOLIC BLOOD PRESSURE: 64 MMHG

## 2022-06-07 DIAGNOSIS — Z3A.32 32 WEEKS GESTATION OF PREGNANCY: ICD-10-CM

## 2022-06-07 DIAGNOSIS — Z67.91 RH NEGATIVE STATE IN ANTEPARTUM PERIOD: Primary | ICD-10-CM

## 2022-06-07 DIAGNOSIS — O26.899 RH NEGATIVE STATE IN ANTEPARTUM PERIOD: Primary | ICD-10-CM

## 2022-06-07 LAB
SL AMB  POCT GLUCOSE, UA: NEGATIVE
SL AMB POCT URINE PROTEIN: NEGATIVE

## 2022-06-07 PROCEDURE — PNV: Performed by: OBSTETRICS & GYNECOLOGY

## 2022-06-07 RX ORDER — CALCIUM CARBONATE 200(500)MG
1 TABLET,CHEWABLE ORAL AS NEEDED
COMMUNITY

## 2022-06-07 NOTE — PROGRESS NOTES
Routine Prenatal Visit  [address] [facility], [address], [facility], [address] [facility]    Assessment/Plan:  Dilma Schwarz is a 34y o  year old  at 32w0d who presents for routine prenatal visit  1  Rh negative state in antepartum period    2  32 weeks gestation of pregnancy  Assessment & Plan:  Discussed reflux strategies  S/S labor reviewed    Orders:  -     POCT urine dip        Subjective:     CC: Prenatal care    Ofelia Harris is a 34 y o   female who presents for routine prenatal care at 32w0d  Pregnancy ROS: no leakage of fluid, pelvic pain, or vaginal bleeding  normal fetal movement  The following portions of the patient's history were reviewed and updated as appropriate: allergies, current medications, past family history, past medical history, obstetric history, gynecologic history, past social history, past surgical history and problem list       Objective:  /64   Ht 5' 9" (1 753 m)   Wt 84 3 kg (185 lb 12 8 oz)   LMP 10/26/2021   BMI 27 44 kg/m²   Pregravid Weight/BMI: 77 6 kg (171 lb) (BMI 25 24)  Current Weight: 84 3 kg (185 lb 12 8 oz)   Total Weight Gain: 6 713 kg (14 lb 12 8 oz)   Pre-Matt Vitals    Flowsheet Row Most Recent Value   Prenatal Assessment    Fetal Heart Rate 135   Fundal Height (cm) 32 cm   Movement Present   Presentation Vertex   Prenatal Vitals    Blood Pressure 100/64   Weight - Scale 84 3 kg (185 lb 12 8 oz)   Urine Albumin/Glucose    Dilation/Effacement/Station    Vaginal Drainage    Edema    LLE Edema None   RLE Edema None   Facial Edema None           General: Well appearing, no distress  Respiratory: Unlabored breathing  Cardiovascular: Regular rate  Abdomen: Soft, gravid, nontender  Fundal Height: Appropriate for gestational age  Extremities: Warm and well perfused  Non tender

## 2022-06-21 ENCOUNTER — ROUTINE PRENATAL (OUTPATIENT)
Dept: OBGYN CLINIC | Facility: CLINIC | Age: 32
End: 2022-06-21

## 2022-06-21 VITALS — DIASTOLIC BLOOD PRESSURE: 60 MMHG | SYSTOLIC BLOOD PRESSURE: 110 MMHG | BODY MASS INDEX: 27.53 KG/M2 | WEIGHT: 186.4 LBS

## 2022-06-21 DIAGNOSIS — O26.899 RH NEGATIVE STATE IN ANTEPARTUM PERIOD: Primary | ICD-10-CM

## 2022-06-21 DIAGNOSIS — Z67.91 RH NEGATIVE STATE IN ANTEPARTUM PERIOD: Primary | ICD-10-CM

## 2022-06-21 DIAGNOSIS — Z3A.34 34 WEEKS GESTATION OF PREGNANCY: ICD-10-CM

## 2022-06-21 LAB
SL AMB  POCT GLUCOSE, UA: NEGATIVE
SL AMB POCT URINE PROTEIN: NEGATIVE

## 2022-06-21 PROCEDURE — PNV: Performed by: OBSTETRICS & GYNECOLOGY

## 2022-06-30 ENCOUNTER — TELEPHONE (OUTPATIENT)
Dept: OBGYN CLINIC | Facility: CLINIC | Age: 32
End: 2022-06-30

## 2022-06-30 NOTE — TELEPHONE ENCOUNTER
Meera called back requesting medication recommendations  Left message on Meera's voice mail about taking Tylenol or Sudafed cold/sinus medication, Mucinex , Robitussin CF,DM or PE, Theraflu and increase fluid intake

## 2022-06-30 NOTE — TELEPHONE ENCOUNTER
Marissa Felt states tested positive on home kit for COVID this morning  She has nasal congestion and cough  She denies any fever or difficulty breathing  Recommended to take Tylenol for fever,body aches,use normal saline nasal spray  go to closest ER,if having difficulty breathing or fever over 102 5  (+)FM, Denies any contractions, vaginal bleeding or LOF  Call back with any other concerns

## 2022-07-05 ENCOUNTER — ROUTINE PRENATAL (OUTPATIENT)
Dept: OBGYN CLINIC | Facility: CLINIC | Age: 32
End: 2022-07-05

## 2022-07-05 VITALS
DIASTOLIC BLOOD PRESSURE: 64 MMHG | HEIGHT: 70 IN | SYSTOLIC BLOOD PRESSURE: 108 MMHG | WEIGHT: 189.6 LBS | BODY MASS INDEX: 27.14 KG/M2

## 2022-07-05 DIAGNOSIS — Z67.91 RH NEGATIVE STATE IN ANTEPARTUM PERIOD: Primary | ICD-10-CM

## 2022-07-05 DIAGNOSIS — Z36.85 ENCOUNTER FOR ANTENATAL SCREENING FOR STREPTOCOCCUS B: ICD-10-CM

## 2022-07-05 DIAGNOSIS — O26.899 RH NEGATIVE STATE IN ANTEPARTUM PERIOD: Primary | ICD-10-CM

## 2022-07-05 DIAGNOSIS — Z3A.36 36 WEEKS GESTATION OF PREGNANCY: ICD-10-CM

## 2022-07-05 LAB
EXTERNAL GROUP B STREP ANTIGEN: NEGATIVE
SL AMB  POCT GLUCOSE, UA: NEGATIVE
SL AMB POCT URINE PROTEIN: NORMAL

## 2022-07-05 PROCEDURE — PNV: Performed by: OBSTETRICS & GYNECOLOGY

## 2022-07-05 RX ORDER — DIPHENHYDRAMINE HCL 25 MG
25 CAPSULE ORAL EVERY 6 HOURS PRN
COMMUNITY
End: 2022-07-13

## 2022-07-05 NOTE — PROGRESS NOTES
Routine Prenatal Visit  [address] [facility], [address], [facility], [address] [facility]    Assessment/Plan:  Jen Morales is a 34y o  year old  at 36w0d who presents for routine prenatal visit  1  Rh negative state in antepartum period  Assessment & Plan:  Received Rhig in May  2  Encounter for  screening for Streptococcus B  -     Strep Gp B Culture; Future  -     Strep Gp B Culture    3  36 weeks gestation of pregnancy  -     POCT urine dip        Next OB Visit 1 weeks  Subjective:     CC: Prenatal care    Terese Gambino is a 34 y o  Amauri Crumb female who presents for routine prenatal care at 36w0d  Pregnancy ROS: no leakage of fluid, pelvic pain, or vaginal bleeding  normal fetal movement  Covid 2022 at home - mild sx, pt improving  The following portions of the patient's history were reviewed and updated as appropriate: allergies, current medications, past family history, past medical history, obstetric history, gynecologic history, past social history, past surgical history and problem list       Objective:  LMP 10/26/2021   Pregravid Weight/BMI: 77 6 kg (171 lb) (BMI 25 24)  Current Weight:     Total Weight Gain: 6 985 kg (15 lb 6 4 oz)        General: Well appearing, no distress  Abdomen: Soft, gravid, nontender  Fundal Height: Appropriate for gestational age  Extremities: Warm and well perfused  Non tender

## 2022-07-13 ENCOUNTER — ROUTINE PRENATAL (OUTPATIENT)
Dept: OBGYN CLINIC | Facility: CLINIC | Age: 32
End: 2022-07-13

## 2022-07-13 VITALS
BODY MASS INDEX: 27.54 KG/M2 | WEIGHT: 192.4 LBS | DIASTOLIC BLOOD PRESSURE: 75 MMHG | SYSTOLIC BLOOD PRESSURE: 115 MMHG | HEIGHT: 70 IN

## 2022-07-13 DIAGNOSIS — Z3A.37 37 WEEKS GESTATION OF PREGNANCY: Primary | ICD-10-CM

## 2022-07-13 DIAGNOSIS — Z67.91 RH NEGATIVE STATE IN ANTEPARTUM PERIOD: ICD-10-CM

## 2022-07-13 DIAGNOSIS — O26.899 RH NEGATIVE STATE IN ANTEPARTUM PERIOD: ICD-10-CM

## 2022-07-13 LAB
SL AMB  POCT GLUCOSE, UA: NEGATIVE
SL AMB POCT URINE PROTEIN: POSITIVE

## 2022-07-13 PROCEDURE — PNV: Performed by: STUDENT IN AN ORGANIZED HEALTH CARE EDUCATION/TRAINING PROGRAM

## 2022-07-13 NOTE — PROGRESS NOTES
Routine Prenatal Visit  [address] [facility], [address], [facility], [address] [facility]    Assessment/Plan:  Maria Del Carmen De La Cruz is a 34y o  year old  at 37w1d who presents for routine prenatal visit  1  37 weeks gestation of pregnancy  Assessment & Plan:  - Labor/Bleeding/ROM precautions given  Kick counts reviewed  - GBS negative result reviewed  - Problem list updated, results console reviewed and updated with pertinent prenatal labs  - PMH, PSH, medications reviewed and updated as needed  - Return to office in 1 wk(s) for routine prenatal care      Orders:  -     POCT urine dip    2  Rh negative state in antepartum period        Subjective:   Cornell Shannon is a 34 y o   who presents for routine prenatal care at 37w1d  Complaints today: No  LOF: No; VB: No; Contractions: No; FM: Present and normal    Objective:  /75 (BP Location: Left arm, Patient Position: Sitting, Cuff Size: Standard)   Ht 5' 10" (1 778 m)   Wt 87 3 kg (192 lb 6 4 oz)   LMP 10/26/2021   BMI 27 61 kg/m²     General: Well appearing, no distress  Respiratory: Unlabored breathing  Cardiovascular: Regular rate  Abdomen: Soft, gravid, nontender  Extremities: Warm and well perfused  Non tender      Pregravid Weight/BMI: 77 6 kg (171 lb) (BMI 24 54)  Current Weight: 87 3 kg (192 lb 6 4 oz)   Total Weight Gain: 9 707 kg (21 lb 6 4 oz)     Pre- Vitals    Flowsheet Row Most Recent Value   Prenatal Assessment    Fetal Heart Rate 140   Fundal Height (cm) 37 cm   Movement Present   Presentation Vertex   Prenatal Vitals    Blood Pressure 115/75   Weight - Scale 87 3 kg (192 lb 6 4 oz)   Urine Albumin/Glucose    Dilation/Effacement/Station    Vaginal Drainage    Draining Fluid No   Edema    LLE Edema None   RLE Edema None   Facial Edema None           Rosaura Forrester MD  2022 5:01 PM

## 2022-07-13 NOTE — ASSESSMENT & PLAN NOTE
- Labor/Bleeding/ROM precautions given  Kick counts reviewed  - GBS negative result reviewed  - Problem list updated, results console reviewed and updated with pertinent prenatal labs    - PMH, PSH, medications reviewed and updated as needed  - Return to office in 1 wk(s) for routine prenatal care

## 2022-07-20 ENCOUNTER — ROUTINE PRENATAL (OUTPATIENT)
Dept: OBGYN CLINIC | Facility: CLINIC | Age: 32
End: 2022-07-20

## 2022-07-20 VITALS — DIASTOLIC BLOOD PRESSURE: 70 MMHG | BODY MASS INDEX: 27.41 KG/M2 | WEIGHT: 191 LBS | SYSTOLIC BLOOD PRESSURE: 112 MMHG

## 2022-07-20 DIAGNOSIS — Z67.91 RH NEGATIVE STATE IN ANTEPARTUM PERIOD: Primary | ICD-10-CM

## 2022-07-20 DIAGNOSIS — Z3A.38 38 WEEKS GESTATION OF PREGNANCY: ICD-10-CM

## 2022-07-20 DIAGNOSIS — O26.899 RH NEGATIVE STATE IN ANTEPARTUM PERIOD: Primary | ICD-10-CM

## 2022-07-20 LAB
SL AMB  POCT GLUCOSE, UA: NORMAL
SL AMB POCT URINE PROTEIN: NORMAL

## 2022-07-20 PROCEDURE — PNV: Performed by: OBSTETRICS & GYNECOLOGY

## 2022-07-20 NOTE — PROGRESS NOTES
Routine Prenatal Visit  [address] [facility], [address], [facility], [address] [facility]    Assessment/Plan:  Yemi Lynn is a 34y o  year old  at 38w1d who presents for routine prenatal visit  1  Rh negative state in antepartum period    2  38 weeks gestation of pregnancy  -     POCT urine dip        Next OB Visit 1 weeks  Subjective:     CC: Prenatal care    Maria De Jesus Puri is a 34 y o   female who presents for routine prenatal care at 38w1d  Pregnancy ROS: no leakage of fluid, pelvic pain, or vaginal bleeding  normal fetal movement  The following portions of the patient's history were reviewed and updated as appropriate: allergies, current medications, past family history, past medical history, obstetric history, gynecologic history, past social history, past surgical history and problem list       Objective:  /70   Wt 86 6 kg (191 lb)   LMP 10/26/2021   Breastfeeding No   BMI 27 41 kg/m²   Pregravid Weight/BMI: 77 6 kg (171 lb) (BMI 24 54)  Current Weight: 86 6 kg (191 lb)   Total Weight Gain: 9 072 kg (20 lb)   Pre-Matt Vitals    Flowsheet Row Most Recent Value   Prenatal Assessment    Fetal Heart Rate 150   Fundal Height (cm) 38 cm   Movement Present   Presentation Vertex   Prenatal Vitals    Blood Pressure 112/70   Weight - Scale 86 6 kg (191 lb)   Urine Albumin/Glucose    Dilation/Effacement/Station    Vaginal Drainage    Edema    LLE Edema None   RLE Edema None           General: Well appearing, no distress  Abdomen: Soft, gravid, nontender  Fundal Height: Appropriate for gestational age  Extremities: Warm and well perfused  Non tender

## 2022-07-28 ENCOUNTER — ROUTINE PRENATAL (OUTPATIENT)
Dept: OBGYN CLINIC | Facility: CLINIC | Age: 32
End: 2022-07-28

## 2022-07-28 VITALS — WEIGHT: 192.8 LBS | BODY MASS INDEX: 27.66 KG/M2 | SYSTOLIC BLOOD PRESSURE: 102 MMHG | DIASTOLIC BLOOD PRESSURE: 60 MMHG

## 2022-07-28 DIAGNOSIS — Z67.91 RH NEGATIVE STATE IN ANTEPARTUM PERIOD: Primary | ICD-10-CM

## 2022-07-28 DIAGNOSIS — Z3A.39 39 WEEKS GESTATION OF PREGNANCY: ICD-10-CM

## 2022-07-28 DIAGNOSIS — O26.899 RH NEGATIVE STATE IN ANTEPARTUM PERIOD: Primary | ICD-10-CM

## 2022-07-28 LAB
SL AMB  POCT GLUCOSE, UA: NEGATIVE
SL AMB POCT URINE PROTEIN: NEGATIVE

## 2022-07-28 PROCEDURE — PNV: Performed by: OBSTETRICS & GYNECOLOGY

## 2022-07-28 NOTE — ASSESSMENT & PLAN NOTE
Reviewed option of elective induction with R/B  Pt and  would like to be reexamined next visit and then discuss timing of delivery

## 2022-07-28 NOTE — PROGRESS NOTES
Routine Prenatal Visit   [address] [facility], [address]    Assessment/Plan:  Denise Coughlin is a 34y o  year old  at 39w2d who presents for routine prenatal visit  1  Rh negative state in antepartum period    2  39 weeks gestation of pregnancy  Assessment & Plan:  Reviewed option of elective induction with R/B  Pt and  would like to be reexamined next visit and then discuss timing of delivery  Orders:  -     POCT urine dip        Subjective:     CC: Prenatal care    Raquel Nieves is a 34 y o  [place] female who presents for routine prenatal care at 39w2d  Pregnancy ROS: no leakage of fluid, pelvic pain, or vaginal bleeding  normal fetal movement  The following portions of the patient's history were reviewed and updated as appropriate: allergies, current medications, past family history, past medical history, obstetric history, gynecologic history, past social history, past surgical history and problem list       Objective:  /60   Wt 87 5 kg (192 lb 12 8 oz)   LMP 10/26/2021   BMI 27 66 kg/m²   Pregravid Weight/BMI: 77 6 kg (171 lb) (BMI 24 54)  Current Weight: 87 5 kg (192 lb 12 8 oz)   Total Weight Gain: 9 888 kg (21 lb 12 8 oz)   Pre-Matt Vitals    Flowsheet Row Most Recent Value   Prenatal Assessment    Fetal Heart Rate 135   Fundal Height (cm) 39 cm   Movement Present   Presentation Vertex   Prenatal Vitals    Blood Pressure 102/60   Weight - Scale 87 5 kg (192 lb 12 8 oz)   Urine Albumin/Glucose    Dilation/Effacement/Station    Cervical Dilation   5   Cervical Effacement 70   Fetal Station -1   Vaginal Drainage    Edema            General: Well appearing, no distress  Respiratory: Unlabored breathing  Cardiovascular: Regular rate  Abdomen: Soft, gravid, nontender  Fundal Height: Appropriate for gestational age  Extremities: Warm and well perfused  Non tender

## 2022-08-02 ENCOUNTER — HOSPITAL ENCOUNTER (OUTPATIENT)
Facility: HOSPITAL | Age: 32
Discharge: HOME | End: 2022-08-02
Attending: OBSTETRICS & GYNECOLOGY | Admitting: OBSTETRICS & GYNECOLOGY
Payer: COMMERCIAL

## 2022-08-02 ENCOUNTER — TELEPHONE (OUTPATIENT)
Dept: OBGYN CLINIC | Facility: CLINIC | Age: 32
End: 2022-08-02

## 2022-08-02 ENCOUNTER — ROUTINE PRENATAL (OUTPATIENT)
Dept: OBGYN CLINIC | Facility: CLINIC | Age: 32
End: 2022-08-02

## 2022-08-02 VITALS — DIASTOLIC BLOOD PRESSURE: 68 MMHG | WEIGHT: 191.2 LBS | BODY MASS INDEX: 27.43 KG/M2 | SYSTOLIC BLOOD PRESSURE: 100 MMHG

## 2022-08-02 VITALS
DIASTOLIC BLOOD PRESSURE: 82 MMHG | WEIGHT: 191 LBS | HEART RATE: 56 BPM | OXYGEN SATURATION: 97 % | RESPIRATION RATE: 16 BRPM | SYSTOLIC BLOOD PRESSURE: 133 MMHG | HEIGHT: 70 IN | BODY MASS INDEX: 27.35 KG/M2 | TEMPERATURE: 97.7 F

## 2022-08-02 DIAGNOSIS — Z67.91 RH NEGATIVE STATE IN ANTEPARTUM PERIOD: Primary | ICD-10-CM

## 2022-08-02 DIAGNOSIS — Z3A.40 40 WEEKS GESTATION OF PREGNANCY: ICD-10-CM

## 2022-08-02 DIAGNOSIS — O26.899 RH NEGATIVE STATE IN ANTEPARTUM PERIOD: Primary | ICD-10-CM

## 2022-08-02 LAB
SL AMB  POCT GLUCOSE, UA: NEGATIVE
SL AMB POCT URINE PROTEIN: NEGATIVE

## 2022-08-02 PROCEDURE — NC001 PR NO CHARGE: Performed by: OBSTETRICS & GYNECOLOGY

## 2022-08-02 PROCEDURE — 99213 OFFICE O/P EST LOW 20 MIN: CPT

## 2022-08-02 NOTE — PROGRESS NOTES
Progress Note - OB/GYN   Orquidea Lot 34 y o  female MRN: [id number]  Unit/Bed#: LD TRIAGE  Encounter: [id number]    A/P  31yo  at 40 0 weeks gestational age, here in early/latent labor  (1) Early/latent labor  Contractions every 3-4 minutes at home  Contractions seen on toco, but quite comfortable here  Cervix 1cm dilated and intact membranes; unchanged on reexam     --> Discharge home with labor precautions      (2) Rh Negative   --> For RhoGAM postpartum      (3) Fetal status reassuring  Reactive NST, normal LIU   --> Fetal kick counts reviewed and reinforced  --> To follow up for office visit today at 1100am      Jenn Albrecht MD    ----------------------------------------------------------------------------------    S/Had contractions, two painful, while walking  No VB, no LoF  Vitals: Blood pressure 133/82, pulse 56, temperature 97 7 °F (36 5 °C), temperature source Temporal, resp  rate 16, height 5' 10" (1 778 m), weight 86 6 kg (191 lb), last menstrual period 10/26/2021, SpO2 97 %, not currently breastfeeding  ,Body mass index is 27 41 kg/m²  No intake or output data in the 24 hours ending 22 0225    Invasive Devices  Timeline    None               O/  Alert comfortable NAD  Cervix 50/-3, soft/posterior

## 2022-08-02 NOTE — H&P
H&P Exam - Obstetrics   Lidia Bedoya 34 y o  female MRN: [id number]  Unit/Bed#: LD TRIAGE  Encounter: [id number]    A/P  29yo  at 40 0 weeks gestational age, here in early/latent labor  (1) Early/latent labor  Contractions every 3-4 minutes at home  Quite comfortable here  Cervix 1cm dilated and intact membranes  --> Will allow to walk one hour, recheck  (2) Rh Negative   --> For RhoGAM postpartum  (3) Fetal status reassuring  Reactive NST, normal LIU   --> May discontinue monitoring while walking  Cullen MD Davonte    ------------------------------------------------------------------------------------------    CC/"I am having contractions "    HPI/Having uterine contractions since yesterday  Today, have become more frequent, now every 3-4 minutes  No VB  No LoF   (+)FM  NKDA    Rx/    PNV    PMH/    Denies  PSH/    Tonsils and adenoids, tympanostomy    ObHx/    :       TAb        Tab    GynHx/    Menarche at 12  There is no history of STI  FH/    No birth defects  SH/    She is   She works for [employer] in [employer]  She does not use tobacco, alcohol, or illicit drugs  RoS/    Constitutional: Negative    CV: Negative    Pulm: Negative    GI: Negative    Urinary: Negative    Musculoskeletal: Negative    Neuro: Negative    Historical Information   OB History    Para Term  AB Living   2       1     SAB IAB Ectopic Multiple Live Births     1            # Outcome Date GA Lbr Jerrod/2nd Weight Sex Delivery Anes PTL Lv   2 Current            1 IAB              Past Medical History:   Diagnosis Date    Abnormal Pap smear of cervix     just follow up    Migraine     UTI (urinary tract infection)     Varicella     chicken pox as child     No past surgical history on file    Social History   Social History     Substance and Sexual Activity   Alcohol Use Not Currently     Social History     Substance and Sexual Activity   Drug Use Never     Social History     Tobacco Use   Smoking Status Never Smoker   Smokeless Tobacco Never Used     E-Cigarette/Vaping    E-Cigarette Use Never User      E-Cigarette/Vaping Substances   No Known Allergies    Objective   Vitals: Blood pressure 133/82, pulse 56, temperature 97 7 °F (36 5 °C), temperature source Temporal, resp  rate 16, height 5' 10" (1 778 m), weight 86 6 kg (191 lb), last menstrual period 10/26/2021, SpO2 97 %, not currently breastfeeding  Body mass index is 27 41 kg/m²  Invasive Devices  Timeline    None               O/T 97 7, HR 56, /82  Alert comfortable NAD  RRR  CTA(B)  Abd soft NT ND  Fundus NT, size c/w dates, EFW 3400grams  Cephalic  Ext NT warm  Pelvis adequate/gynecoid  Cervix 1/50/-3, soft/posterior     moderate variability (+)accels, no decels    Reactive  [place] approx q5"    Ultrasound:    Single live active IUP, cephalic    LIU 67 98ON    Prenatal labs/    12/30/21    A-/-    RPR Negative    HBSAg Negative    HCVAb Negative    HIV Negative    Rubella Immune    UCx Contaminants    3/9/22     MSAFP Negative    3/18/22 (20 3) U/S anatomy normal, CL 4 55cm    4/12/22    A-/-    Hgb 12 7    1hrPG 87    RPR Negative    7/5/22     GBS Negative

## 2022-08-02 NOTE — PROGRESS NOTES
Routine Prenatal Visit  [address] [facility], [address], [facility], [address] [facility]    Assessment/Plan:  Aline Lyons is a 34y o  year old  at 37w0d who presents for routine prenatal visit  1  Rh negative state in antepartum period    2  40 weeks gestation of pregnancy  Assessment & Plan:  Pt was in L+D early this AM with contractions and was sent home as she had not made cervical change  She continues to have irregular contractions  She would like to schedule induction for 22    Orders:  -     POCT urine dip          Subjective:     CC: Prenatal care    Nadia Donohue is a 34 y o   female who presents for routine prenatal care at 40w0d  Pregnancy ROS: no leakage of fluid, pelvic pain, or vaginal bleeding  normal fetal movement  The following portions of the patient's history were reviewed and updated as appropriate: allergies, current medications, past family history, past medical history, obstetric history, gynecologic history, past social history, past surgical history and problem list       Objective:  /68   Wt 86 7 kg (191 lb 3 2 oz)   LMP 10/26/2021   BMI 27 43 kg/m²   Pregravid Weight/BMI: 77 6 kg (171 lb) (BMI 24 54)  Current Weight: 86 7 kg (191 lb 3 2 oz)   Total Weight Gain: 9 163 kg (20 lb 3 2 oz)   Pre- Vitals    Flowsheet Row Most Recent Value   Prenatal Assessment    Fetal Heart Rate 135   Fundal Height (cm) 39 cm   Movement Present   Presentation Vertex   Prenatal Vitals    Blood Pressure 100/68   Weight - Scale 86 7 kg (191 lb 3 2 oz)   Urine Albumin/Glucose    Dilation/Effacement/Station    Cervical Dilation 2   Cervical Effacement 90   Fetal Station 0   Vaginal Drainage    Draining Fluid No   Edema            General: Well appearing, no distress  Respiratory: Unlabored breathing  Cardiovascular: Regular rate  Abdomen: Soft, gravid, nontender  Fundal Height: Appropriate for gestational age  Extremities: Warm and well perfused  Non tender

## 2022-08-02 NOTE — ASSESSMENT & PLAN NOTE
Pt was in L+D early this AM with contractions and was sent home as she had not made cervical change  She continues to have irregular contractions   She would like to schedule induction for 8/4/22

## 2022-08-02 NOTE — TELEPHONE ENCOUNTER
Reviewed with Dr Gregoria Hebert who spoke with Avel Grajeda at [facility] for induction tomorrow   Arrive at [address] [facility]

## 2022-08-03 ENCOUNTER — ANESTHESIA (INPATIENT)
Dept: ANESTHESIOLOGY | Facility: HOSPITAL | Age: 32
End: 2022-08-03
Payer: COMMERCIAL

## 2022-08-03 ENCOUNTER — HOSPITAL ENCOUNTER (OUTPATIENT)
Dept: LABOR AND DELIVERY | Facility: HOSPITAL | Age: 32
Discharge: HOME | End: 2022-08-03
Payer: COMMERCIAL

## 2022-08-03 ENCOUNTER — HOSPITAL ENCOUNTER (INPATIENT)
Facility: HOSPITAL | Age: 32
LOS: 1 days | Discharge: HOME | End: 2022-08-04
Attending: STUDENT IN AN ORGANIZED HEALTH CARE EDUCATION/TRAINING PROGRAM | Admitting: STUDENT IN AN ORGANIZED HEALTH CARE EDUCATION/TRAINING PROGRAM
Payer: COMMERCIAL

## 2022-08-03 ENCOUNTER — ANESTHESIA EVENT (INPATIENT)
Dept: ANESTHESIOLOGY | Facility: HOSPITAL | Age: 32
End: 2022-08-03
Payer: COMMERCIAL

## 2022-08-03 DIAGNOSIS — Z34.90 ENCOUNTER FOR ELECTIVE INDUCTION OF LABOR: Primary | ICD-10-CM

## 2022-08-03 LAB
ABO GROUP BLD: NORMAL
BASE EXCESS BLDCOA CALC-SCNC: -9.6 MMOL/L (ref 3–11)
BASE EXCESS BLDCOV CALC-SCNC: -4.5 MMOL/L (ref 1–9)
BLD GP AB SCN SERPL QL: NEGATIVE
ERYTHROCYTE [DISTWIDTH] IN BLOOD BY AUTOMATED COUNT: 12.1 % (ref 11.6–15.1)
HCO3 BLDCOA-SCNC: 18.5 MMOL/L (ref 17.3–27.3)
HCO3 BLDCOV-SCNC: 21 MMOL/L (ref 12.2–28.6)
HCT VFR BLD AUTO: 35.8 % (ref 34.8–46.1)
HGB BLD-MCNC: 12.4 G/DL (ref 11.5–15.4)
MCH RBC QN AUTO: 29.7 PG (ref 26.8–34.3)
MCHC RBC AUTO-ENTMCNC: 34.6 G/DL (ref 31.4–37.4)
MCV RBC AUTO: 86 FL (ref 82–98)
O2 CT VFR BLDCOA CALC: 12.2 ML/DL
OXYHGB MFR BLDCOA: 51.7 %
OXYHGB MFR BLDCOV: 67.6 %
PCO2 BLDCOA: 48.4 MM[HG] (ref 30–60)
PCO2 BLDCOV: 40.3 MM HG (ref 27–43)
PH BLDCOA: 7.2 [PH] (ref 7.23–7.43)
PH BLDCOV: 7.33 [PH] (ref 7.19–7.49)
PLATELET # BLD AUTO: 157 THOUSANDS/UL (ref 149–390)
PMV BLD AUTO: 11.2 FL (ref 8.9–12.7)
PO2 BLDCOA: 24.9 MM HG (ref 5–25)
PO2 BLDCOV: 28.4 MM HG (ref 15–45)
RBC # BLD AUTO: 4.17 MILLION/UL (ref 3.81–5.12)
RH BLD: NEGATIVE
SAO2 % BLDCOV: 15.3 ML/DL
SPECIMEN EXPIRATION DATE: NORMAL
WBC # BLD AUTO: 9.78 THOUSAND/UL (ref 4.31–10.16)

## 2022-08-03 PROCEDURE — 82805 BLOOD GASES W/O2 SATURATION: CPT | Performed by: STUDENT IN AN ORGANIZED HEALTH CARE EDUCATION/TRAINING PROGRAM

## 2022-08-03 PROCEDURE — NC001 PR NO CHARGE: Performed by: STUDENT IN AN ORGANIZED HEALTH CARE EDUCATION/TRAINING PROGRAM

## 2022-08-03 PROCEDURE — 86900 BLOOD TYPING SEROLOGIC ABO: CPT | Performed by: STUDENT IN AN ORGANIZED HEALTH CARE EDUCATION/TRAINING PROGRAM

## 2022-08-03 PROCEDURE — 85027 COMPLETE CBC AUTOMATED: CPT | Performed by: STUDENT IN AN ORGANIZED HEALTH CARE EDUCATION/TRAINING PROGRAM

## 2022-08-03 PROCEDURE — 59400 OBSTETRICAL CARE: CPT | Performed by: STUDENT IN AN ORGANIZED HEALTH CARE EDUCATION/TRAINING PROGRAM

## 2022-08-03 PROCEDURE — 86901 BLOOD TYPING SEROLOGIC RH(D): CPT | Performed by: STUDENT IN AN ORGANIZED HEALTH CARE EDUCATION/TRAINING PROGRAM

## 2022-08-03 PROCEDURE — 86592 SYPHILIS TEST NON-TREP QUAL: CPT | Performed by: STUDENT IN AN ORGANIZED HEALTH CARE EDUCATION/TRAINING PROGRAM

## 2022-08-03 PROCEDURE — 10907ZC DRAINAGE OF AMNIOTIC FLUID, THERAPEUTIC FROM PRODUCTS OF CONCEPTION, VIA NATURAL OR ARTIFICIAL OPENING: ICD-10-PCS | Performed by: STUDENT IN AN ORGANIZED HEALTH CARE EDUCATION/TRAINING PROGRAM

## 2022-08-03 PROCEDURE — 88307 TISSUE EXAM BY PATHOLOGIST: CPT | Performed by: PATHOLOGY

## 2022-08-03 PROCEDURE — 4A1HXCZ MONITORING OF PRODUCTS OF CONCEPTION, CARDIAC RATE, EXTERNAL APPROACH: ICD-10-PCS | Performed by: STUDENT IN AN ORGANIZED HEALTH CARE EDUCATION/TRAINING PROGRAM

## 2022-08-03 PROCEDURE — 3E033VJ INTRODUCTION OF OTHER HORMONE INTO PERIPHERAL VEIN, PERCUTANEOUS APPROACH: ICD-10-PCS | Performed by: STUDENT IN AN ORGANIZED HEALTH CARE EDUCATION/TRAINING PROGRAM

## 2022-08-03 PROCEDURE — 86850 RBC ANTIBODY SCREEN: CPT | Performed by: STUDENT IN AN ORGANIZED HEALTH CARE EDUCATION/TRAINING PROGRAM

## 2022-08-03 PROCEDURE — 0HQ9XZZ REPAIR PERINEUM SKIN, EXTERNAL APPROACH: ICD-10-PCS | Performed by: STUDENT IN AN ORGANIZED HEALTH CARE EDUCATION/TRAINING PROGRAM

## 2022-08-03 RX ORDER — LIDOCAINE HYDROCHLORIDE AND EPINEPHRINE 15; 5 MG/ML; UG/ML
INJECTION, SOLUTION EPIDURAL AS NEEDED
Status: DISCONTINUED | OUTPATIENT
Start: 2022-08-03 | End: 2022-08-03 | Stop reason: HOSPADM

## 2022-08-03 RX ORDER — DIAPER,BRIEF,INFANT-TODD,DISP
1 EACH MISCELLANEOUS DAILY PRN
Status: DISCONTINUED | OUTPATIENT
Start: 2022-08-03 | End: 2022-08-04 | Stop reason: HOSPADM

## 2022-08-03 RX ORDER — OXYTOCIN/RINGER'S LACTATE 30/500 ML
1-30 PLASTIC BAG, INJECTION (ML) INTRAVENOUS
Status: DISCONTINUED | OUTPATIENT
Start: 2022-08-03 | End: 2022-08-03

## 2022-08-03 RX ORDER — ROPIVACAINE HYDROCHLORIDE 2 MG/ML
INJECTION, SOLUTION EPIDURAL; INFILTRATION; PERINEURAL AS NEEDED
Status: DISCONTINUED | OUTPATIENT
Start: 2022-08-03 | End: 2022-08-03 | Stop reason: HOSPADM

## 2022-08-03 RX ORDER — DIPHENHYDRAMINE HCL 25 MG
25 TABLET ORAL EVERY 6 HOURS PRN
Status: DISCONTINUED | OUTPATIENT
Start: 2022-08-03 | End: 2022-08-04 | Stop reason: HOSPADM

## 2022-08-03 RX ORDER — ACETAMINOPHEN 325 MG/1
650 TABLET ORAL EVERY 4 HOURS PRN
Status: DISCONTINUED | OUTPATIENT
Start: 2022-08-03 | End: 2022-08-04 | Stop reason: HOSPADM

## 2022-08-03 RX ORDER — LIDOCAINE HYDROCHLORIDE 10 MG/ML
INJECTION, SOLUTION EPIDURAL; INFILTRATION; INTRACAUDAL; PERINEURAL AS NEEDED
Status: DISCONTINUED | OUTPATIENT
Start: 2022-08-03 | End: 2022-08-03 | Stop reason: HOSPADM

## 2022-08-03 RX ORDER — SODIUM CHLORIDE, SODIUM LACTATE, POTASSIUM CHLORIDE, CALCIUM CHLORIDE 600; 310; 30; 20 MG/100ML; MG/100ML; MG/100ML; MG/100ML
125 INJECTION, SOLUTION INTRAVENOUS CONTINUOUS
Status: DISCONTINUED | OUTPATIENT
Start: 2022-08-03 | End: 2022-08-03

## 2022-08-03 RX ORDER — BUTORPHANOL TARTRATE 1 MG/ML
1 INJECTION, SOLUTION INTRAMUSCULAR; INTRAVENOUS
Status: DISCONTINUED | OUTPATIENT
Start: 2022-08-03 | End: 2022-08-03

## 2022-08-03 RX ORDER — CALCIUM CARBONATE 200(500)MG
1000 TABLET,CHEWABLE ORAL DAILY PRN
Status: DISCONTINUED | OUTPATIENT
Start: 2022-08-03 | End: 2022-08-04 | Stop reason: HOSPADM

## 2022-08-03 RX ORDER — ONDANSETRON 2 MG/ML
4 INJECTION INTRAMUSCULAR; INTRAVENOUS EVERY 8 HOURS PRN
Status: DISCONTINUED | OUTPATIENT
Start: 2022-08-03 | End: 2022-08-04 | Stop reason: HOSPADM

## 2022-08-03 RX ORDER — DOCUSATE SODIUM 100 MG/1
100 CAPSULE, LIQUID FILLED ORAL 2 TIMES DAILY
Status: DISCONTINUED | OUTPATIENT
Start: 2022-08-03 | End: 2022-08-04 | Stop reason: HOSPADM

## 2022-08-03 RX ORDER — ONDANSETRON 2 MG/ML
4 INJECTION INTRAMUSCULAR; INTRAVENOUS EVERY 6 HOURS PRN
Status: DISCONTINUED | OUTPATIENT
Start: 2022-08-03 | End: 2022-08-03

## 2022-08-03 RX ORDER — IBUPROFEN 600 MG/1
600 TABLET ORAL EVERY 6 HOURS PRN
Status: DISCONTINUED | OUTPATIENT
Start: 2022-08-03 | End: 2022-08-04 | Stop reason: HOSPADM

## 2022-08-03 RX ADMIN — LIDOCAINE HYDROCHLORIDE AND EPINEPHRINE 3 ML: 15; 5 INJECTION, SOLUTION EPIDURAL at 14:22

## 2022-08-03 RX ADMIN — DOCUSATE SODIUM 100 MG: 100 CAPSULE, LIQUID FILLED ORAL at 20:10

## 2022-08-03 RX ADMIN — Medication 2 MILLI-UNITS/MIN: at 09:21

## 2022-08-03 RX ADMIN — SODIUM CHLORIDE, SODIUM LACTATE, POTASSIUM CHLORIDE, AND CALCIUM CHLORIDE 125 ML/HR: .6; .31; .03; .02 INJECTION, SOLUTION INTRAVENOUS at 09:21

## 2022-08-03 RX ADMIN — LIDOCAINE HYDROCHLORIDE 4 ML: 10 INJECTION, SOLUTION EPIDURAL; INFILTRATION; INTRACAUDAL; PERINEURAL at 14:18

## 2022-08-03 RX ADMIN — WITCH HAZEL 1 PAD: 500 SOLUTION RECTAL; TOPICAL at 20:11

## 2022-08-03 RX ADMIN — HYDROCORTISONE 1 APPLICATION: 1 CREAM TOPICAL at 20:11

## 2022-08-03 RX ADMIN — ROPIVACAINE HYDROCHLORIDE 3 ML: 2 INJECTION, SOLUTION EPIDURAL; INFILTRATION at 14:39

## 2022-08-03 RX ADMIN — BENZOCAINE AND LEVOMENTHOL 1 APPLICATION: 200; 5 SPRAY TOPICAL at 20:11

## 2022-08-03 RX ADMIN — IBUPROFEN 600 MG: 600 TABLET ORAL at 20:10

## 2022-08-03 RX ADMIN — ROPIVACAINE HYDROCHLORIDE 7 ML: 2 INJECTION, SOLUTION EPIDURAL; INFILTRATION at 14:34

## 2022-08-03 RX ADMIN — SODIUM CHLORIDE, SODIUM LACTATE, POTASSIUM CHLORIDE, AND CALCIUM CHLORIDE 125 ML/HR: .6; .31; .03; .02 INJECTION, SOLUTION INTRAVENOUS at 13:46

## 2022-08-03 NOTE — OB LABOR/OXYTOCIN SAFETY PROGRESS
Oxytocin Safety Progress Check Note - Dawson Ruelas 34 y o  female MRN: [id number]    Unit/Bed#: -01 Encounter: [id number]    Dose (cathy-units/min) Oxytocin: 10 cathy-units/min  Contraction Frequency (minutes): 2-4  Contraction Quality: Moderate  Tachysystole: No   Cervical Dilation: 5-6        Cervical Effacement: 100  Fetal Station: -1  Baseline Rate: 130 bpm  Fetal Heart Rate: 131 BPM  FHR Category: Category I           Vital Signs:   Vitals:    22 1230   BP: 121/70   Pulse: 82   Resp:    Temp:    SpO2:        Notes/comments:   - Fetal and maternal status reassuring  Patient progressing appropriately in early labor    - Continue pitocin titration  - Anticipate           Jared Vera MD 8/3/2022 1:22 PM

## 2022-08-03 NOTE — ANESTHESIA PROCEDURE NOTES
Epidural Block    Patient location during procedure: OB  Start time: 8/3/2022 2:22 PM  Reason for block: at surgeon's request and primary anesthetic  Staffing  Performed: Anesthesiologist   Anesthesiologist: Tawnya Giang MD  Preanesthetic Checklist  Completed: patient identified, IV checked, risks and benefits discussed, surgical consent, monitors and equipment checked, pre-op evaluation and timeout performed  Epidural  Patient position: sitting  Prep: Betadine  Patient monitoring: frequent blood pressure checks and continuous pulse ox  Approach: midline  Location: lumbar  Injection technique: SADE air  Needle  Needle type: Tuohy   Needle gauge: 18 G  Catheter type: end hole  Catheter size: 20 G  Catheter at skin depth: 10 cm  Catheter securement method: stabilization device  Test dose: negative  Assessment  Number of attempts: 1negative aspiration for CSF, negative aspiration for heme and no paresthesia on injection  patient tolerated the procedure well with no immediate complications

## 2022-08-03 NOTE — ANESTHESIA PREPROCEDURE EVALUATION
Procedure:  LABOR ANALGESIA    Relevant Problems   ANESTHESIA (within normal limits)     Labs:   Results from last 7 days   Lab Units 08/03/22  0831   WBC Thousand/uL 9 78   HEMOGLOBIN g/dL 12 4   HEMATOCRIT % 35 8   PLATELETS Thousands/uL 157     Type and Screen:  Results from last 7 days   Lab Units 08/03/22  0831   ABO GROUPING  A   RH FACTOR  Negative   ANTIBODY SCREEN  Negative   SPECIMEN EXPIRATION DATE  [id number]      Physical Exam    Airway    Mallampati score: I  TM Distance: >3 FB  Neck ROM: full     Dental   No notable dental hx     Cardiovascular  Cardiovascular exam normal    Pulmonary  Pulmonary exam normal     Other Findings        Anesthesia Plan  ASA Score- 1     Anesthesia Type- epidural with ASA Monitors  Additional Monitors:   Airway Plan:     Comment: Patient is requesting epidural for labor  Patient seen and examined  The risks/benefits of Epidural anesthesia discussed including risk of PDPH, partial or failed block, and rare emergencies including infection, nerve injury and total spinal anesthesia  Anesthetic plan for potential c/s in event of emergency reviewed with patient          Plan Factors-    Chart reviewed  Existing labs reviewed  Patient summary reviewed  Induction-     Postoperative Plan-     Informed Consent- Anesthetic plan and risks discussed with patient

## 2022-08-03 NOTE — PLAN OF CARE
Problem: Knowledge Deficit  Goal: Verbalizes understanding of labor plan  Description: Assess patient/family/caregiver's baseline knowledge level and ability to understand information  Provide education via patient/family/caregiver's preferred learning method at appropriate level of understanding  1  Provide teaching at level of understanding  2  Provide teaching via preferred learning method(s)  Outcome: Progressing     Problem: Labor & Delivery  Goal: Manages discomfort  Description: Assess and monitor for signs and symptoms of discomfort  Assess patient's pain level regularly and per hospital policy  Administer medications as ordered  Support use of nonpharmacological methods to help control pain such as distraction, imagery, relaxation, and application of heat and cold  Collaborate with interdisciplinary team and patient to determine appropriate pain management plan  1  Include patient in decisions related to comfort  2  Offer non-pharmacological pain management interventions  3  Report ineffective pain management to physician  Outcome: Progressing  Goal: Patient vital signs are stable  Description: 1  Assess vital signs - vaginal delivery    Outcome: Progressing     Problem: BIRTH - VAGINAL/ SECTION  Goal: Fetal and maternal status remain reassuring during the birth process  Description: INTERVENTIONS:  - Monitor vital signs  - Monitor fetal heart rate  - Monitor uterine activity  - Monitor labor progression (vaginal delivery)  - DVT prophylaxis  - Antibiotic prophylaxis  Outcome: Progressing  Goal: Emotionally satisfying birthing experience for mother/fetus  Description: Interventions:  - Assess, plan, implement and evaluate the nursing care given to the patient in labor  - Advocate the philosophy that each childbirth experience is a unique experience and support the family's chosen level of involvement and control during the labor process   - Actively participate in both the patient's and family's teaching of the birth process  - Consider cultural, Jainism and age-specific factors and plan care for the patient in labor  Outcome: Progressing

## 2022-08-03 NOTE — H&P
History & Physical - OB/GYN   Roni Landers 34 y o  female MRN: [id number]  Unit/Bed#: -01 Encounter: [id number]    Assessment:  34 y o   at 40w1d admitted for elective induction of labor    Plan:  1  Admit to L&D  2  Place IV, initiate IV fluids  3  Labs: CBC, RPR, type and screen  4  Labor management: start pitocin  5  Analgesia/Epidural PRN      Rh negative state in antepartum period  - Plan for Rhogam workup postpartum    _____________________    Chief complaint: Scheduled induction of labor    She is a patient of [address]  HPI: Roni Landers is 34 y o   at 40w1d presenting for scheduled elective induction of labor    Contractions:  Yes - irregular and mild  Fetal movement:  yes  Vaginal bleeding:   no  Leaking of fluid:  no      Pregnancy Complications:  G0,W4  SHANNAN:2022 Problems (from 21 to present)     Problem Noted Resolved    Rh negative state in antepartum period 2022 by Pedro Mcleod MD No    Overview Signed 2022 11:36 AM by Drew Wagner MD     Rhig - 22         40 weeks gestation of pregnancy 2022 by Pedro Mcleod MD No    Overview Signed 5/3/2022  9:07 PM by Joe Stewart MD     Covid vaccine completed                 PMH:  Past Medical History:   Diagnosis Date    Abnormal Pap smear of cervix     just follow up    Migraine     UTI (urinary tract infection)     Varicella     chicken pox as child       PSH:  No past surgical history on file      Social Hx:  Social History     Tobacco Use    Smoking status: Never Smoker    Smokeless tobacco: Never Used   Vaping Use    Vaping Use: Never used   Substance Use Topics    Alcohol use: Not Currently    Drug use: Never        OB Hx:  OB History    Para Term  AB Living   2 0 0 0 1 0   SAB IAB Ectopic Multiple Live Births   0 1 0 0 0      # Outcome Date GA Lbr Jerrod/2nd Weight Sex Delivery Anes PTL Lv   2 Current            1 IAB                Meds:  No current facility-administered medications on file prior to encounter  Current Outpatient Medications on File Prior to Encounter   Medication Sig Dispense Refill    calcium carbonate (TUMS) 500 mg chewable tablet Chew 1 tablet if needed for indigestion or heartburn      Prenatal MV-Min-Fe Fum-FA-DHA (PRENATAL 1 PO) Take 1 tablet by mouth         Allergies:  No Known Allergies    Labs:  ABO Grouping   Date Value Ref Range Status   05/23/2022 A  Final      Rh Factor   Date Value Ref Range Status   05/23/2022 Negative  Final      Rh Factor   Date Value Ref Range Status   05/23/2022 Negative  Final     HIV-1/HIV-2 Ab   Date Value Ref Range Status   12/30/2021 Non-Reactive Non-Reactive Final     Hepatitis B Surface Ag   Date Value Ref Range Status   12/30/2021 Non-reactive Non-reactive, NonReactive - Confirmed Final     Hepatitis C Ab   Date Value Ref Range Status   12/30/2021 Non-reactive Non-reactive Final     RPR   Date Value Ref Range Status   04/12/2022 Non-Reactive Non-Reactive Final     Rubella IgG Quant   Date Value Ref Range Status   12/30/2021 26 6 >9 9 IU/mL Final     Glucose   Date Value Ref Range Status   04/12/2022 87 40 - 134 mg/dL Final     Comment:     Specimen collection should occur prior to Sulfasalazine administration due to the potential for falsely depressed results  Specimen collection should occur prior to Sulfapyridine administration due to the potential for falsely elevated results  Specimen collection should occur prior to Sulfasalazine administration due to the potential for falsely depressed results  Specimen collection should occur prior to Sulfapyridine administration due to the potential for falsely elevated results       No results found for: [id number]  No results found for: [facility]    Physical Exam:  /79   Pulse 78   LMP 10/26/2021     Gen: alert, no acute distress  Cardiac: regular rate  Resp: unlabored breathing  Abdomen: gravid, non-tender, non-distended  Extremities: non-tender, no edema    Estimated Fetal Weight: 7 0 lbs  Presentation: cephalic, confirmed via u/s    SVE: Cervical Dilation: 3-4  Cervical Effacement: 90  Cervical Consistency: Soft  Fetal Station: -2    Pelvis assessed and felt to be adequate    FHT:  Baseline Rate: 130 bpm  FHR Category: Category I  TOCO:   Contraction Frequency (minutes): 8  Contraction Duration (seconds):   Contraction Quality: Mild    Membranes: Intact      Jared Vera MD  8/3/2022 8:47 AM

## 2022-08-03 NOTE — PROGRESS NOTES
08/03/22 0844   Oxytocin Safety Bundle   Oxytocin Initiation Type Induction   Indications for Induction Elective (>39 wks GA only)   Induction/Augmentation Consent Completed Yes   Estimated Fetal Weight 7 0 lbs   Baseline Rate 130 bpm   FHR Category Category I   Contraction Frequency (minutes) 8   Contraction Duration (seconds)    Contraction Quality Mild   Tachysystole No   Pastor Score   Cervical Consistency 2   Cervical Dilation 3 5   Cervical Effacement 90   Cervical Position 2   Fetal Station -2   Pastor Score 10   Oxytocin Safety Bundle Completion   Oxytocin Safety Bundle complete? Yes   Safe to proceed?  Yes     Brenda Britt MD  8/3/2022 8:49 AM

## 2022-08-03 NOTE — OB LABOR/OXYTOCIN SAFETY PROGRESS
Oxytocin Safety Progress Check Note - Ziyad Reynolds 34 y o  female MRN: [id number]    Unit/Bed#: -01 Encounter: [id number]    Dose (cathy-units/min) Oxytocin: 10 cathy-units/min  Contraction Frequency (minutes): 3-5  Contraction Quality: Moderate  Tachysystole: No   Cervical Dilation: 9        Cervical Effacement: 100  Fetal Station: 0  Baseline Rate: 120 bpm  Fetal Heart Rate: 131 BPM  FHR Category: Category II           Vital Signs:   Vitals:    22 1437   BP: 126/77   Pulse: 66   Resp:    Temp:    SpO2:        Notes/comments:   - Fetal heart rate tracing has improved  Technically category II due to occasional shallow, non-repetitive variable decelerations  Overall reassuring in the active phase of labor, given moderate variability and present accelerations  - Given spacing of contractions, will re-initiate pitocin at rate of 4 mu/min and titrate, per protocol    - Plan of care reviewed with patient and partner at bedside  Plan for reassessment of cervical dilation in 30-60 minutes  Anticipate       Aimee Carpio MD 8/3/2022 3:20 PM

## 2022-08-03 NOTE — L&D DELIVERY NOTE
DELIVERY NOTE  Frank Raymundo 34 y o  female MRN: [id number]  Unit/Bed#:  206-01 Encounter: [id number]    Obstetrician:  Rodrigo Avitia MD    Pre-Delivery Diagnosis: Rebolledo pregnancy in vertex presentation at 40w1d gestation  Post-Delivery Diagnosis: Same, delivered    Procedure: Spontaneous vaginal delivery    Delivery Date and Time:  8/7/4259    Umbilical Artery  Recent Labs     08/03/22  1726   PHCART 7 201*   BECART -9 6*       Umbilical Vein  Recent Labs     08/03/22  1726   PHCVEN 7 335   BECVEN -4 5*       Apgars: 9 at 1 minute, 9 at 5 minutes    Weight: 3730 grams           Complications: None    Description of Procedure:  Patient was found to be completely dilated and +1 station  She pushed for 60 minutes to spontaneously deliver a liveborn male infant, "Alonzo," in direct occiput anterior position through meconium-stained fluid at 17:25  The head and shoulders delivered easily, with the body easily delivering thereafter  The infant was placed on maternal abdomen  Cord clamping was delayed for 30 seconds, after which the cord was doubly clamped and cut  Routine cord gases and cord blood were collected  Pitocin was started for active management of the 3rd stage  Placenta delivered spontaneously and was noted to have a centrally-inserted 3-vessel cord  The placenta was sent to pathology  The fundus was noted to be firm  A thorough pelvic exam revealed a 1st degree laceration, which was repaired with 3-0 polyglactin suture in typical fashion  Excellent hemostasis was noted, with total QBL of 256 mL  All needle, sponge, and instrument counts were noted to be correct  The patient tolerated the procedure well and was allowed to recover in labor and delivery room       Xiao Montiel MD  8/3/2022 5:57 PM

## 2022-08-03 NOTE — OB LABOR/OXYTOCIN SAFETY PROGRESS
Oxytocin Safety Progress Check Note - Magali Chowdhury 34 y o  female MRN: [id number]    Unit/Bed#: -01 Encounter: [id number]    Dose (cathy-units/min) Oxytocin: 4 cathy-units/min (restarted at 4mu/min per Dr Malu Pina)  Contraction Frequency (minutes): 2-3  Contraction Quality: Strong  Tachysystole: No   Cervical Dilation: 10  Dilation Complete Date: 22  Dilation Complete Time: 1622  Cervical Effacement: 100  Fetal Station: 1  Baseline Rate: 135 bpm  Fetal Heart Rate: 121 BPM  FHR Category: Category II           Vital Signs:   Vitals:    22 1546   BP: 108/56   Pulse: 57   Resp:    Temp:    SpO2:        Notes/comments:   - Patient found to be complete and +1 station  Excellent maternal effort with initial pushes  Descent noted to +2 station    - FHR category II due to variable decelerations with pushing  Overall reassuring, given moderate variability and present accelerations  Will continue pushing, given appropriate progress in the second stage of labor    - Anticipate            Jadyn Ta MD 8/3/2022 4:37 PM

## 2022-08-03 NOTE — OB LABOR/OXYTOCIN SAFETY PROGRESS
Oxytocin Safety Progress Check Note - Gigi Luna 34 y o  female MRN: [id number]    Unit/Bed#: -01 Encounter: [id number]    Dose (cathy-units/min) Oxytocin: 6 cathy-units/min  Contraction Frequency (minutes): 2-4  Contraction Quality: Mild  Tachysystole: No   Cervical Dilation: 3-4        Cervical Effacement: 90  Fetal Station: -2  Baseline Rate: 130 bpm  Fetal Heart Rate: 131 BPM  FHR Category: Category I           Vital Signs:   Vitals:    22 0821   BP: 119/79   Pulse: 78   Resp: 18   Temp: 97 9 °F (36 6 °C)   SpO2: 98%       Notes/comments:   - Fetal and maternal status reassuring    - Patient overall comfortable, but feeling mild contractions   - AROM performed on exam at 11:22  Clear fluid noted  - Continue pitocin titration  Anticipate         Sheri Marina MD 8/3/2022 11:26 AM

## 2022-08-03 NOTE — OB LABOR/OXYTOCIN SAFETY PROGRESS
Oxytocin Safety Progress Check Note - Cynthia Serum 34 y o  female MRN: [id number]    Unit/Bed#: -01 Encounter: [id number]    Dose (cathy-units/min) Oxytocin: 0 cathy-units/min  Contraction Frequency (minutes): 2  Contraction Quality: Strong  Tachysystole: No   Cervical Dilation: 8        Cervical Effacement: 100  Fetal Station: 0  Baseline Rate: 120 bpm  Fetal Heart Rate: 131 BPM  FHR Category: Category II           Vital Signs:   Vitals:    08/03/22 1437   BP: 126/77   Pulse: 66   Resp:    Temp:    SpO2:        Notes/comments:   SAFETY HUDDLE:  TRIGGER: Category 2 FHR tracing    PARTICIPANTS: Senait Brewer RN; Spenser Yoo RN; Rudean Fabry, RN; Charla Charles MD    REVIEW OF CURRENT PLAN OF CARE AND MANAGEMENT: The tracing is Category 2 with the presence of moderate variability and accelerations  A spontaneous 8 minute prolonged deceleration occurred at the time of epidural administration after test dose but prior to administration of anesthetic  Pitocin was discontinued, maternal position changes performed, and IV fluid bolus administered  Slow FHR return to baseline occurred thereafter  Rapid cervical change with descent of fetal vertex is now noted on exam  Given continued progress in active phase of labor, I recommend continuing with induction of labor  Will plan to re-initiate pitocin after period of fetal recovery  TIMELINE FOR NEXT ASSESSMENT: Ongoing  Plan for re-examination in 30-60 minutes       ALL PARTICIPANTS IN AGREEMENT WITH PLAN OF CARE: Yes    IS PERRT REQUIRED: No     Darus Alpers, MD 8/3/2022

## 2022-08-03 NOTE — OB LABOR/OXYTOCIN SAFETY PROGRESS
Oxytocin Safety Progress Check Note - Micah Blunt 34 y o  female MRN: [id number]    Unit/Bed#: -01 Encounter: [id number]    Dose (cathy-units/min) Oxytocin: 10 cathy-units/min  Contraction Frequency (minutes): 2  Contraction Quality: Strong  Tachysystole: No   Cervical Dilation: 6        Cervical Effacement: 100  Fetal Station: -1  Baseline Rate: 120 bpm  Fetal Heart Rate: 131 BPM  FHR Category: Category I           Vital Signs:   Vitals:    22 1319   BP: 140/75   Pulse: 69   Resp:    Temp: 98 4 °F (36 9 °C)   SpO2:        Notes/comments:   - In for reassessment of cervical dilation at patient request  Patient requesting anesthesia  - Fetal and maternal status remain reassuring  Patient continues to progress appropriately; now in active phase of labor    - Continue pitocin titration  Anticipate         Maddison Bach MD 8/3/2022 2:04 PM

## 2022-08-03 NOTE — ANESTHESIA POSTPROCEDURE EVALUATION
Post-Op Assessment Note    CV Status:  Stable    Pain management: adequate     Mental Status:  Alert and awake   Hydration Status:  Euvolemic   PONV Controlled:  Controlled   Airway Patency:  Patent      Post Op Vitals Reviewed: Yes      Staff: Anesthesiologist, CRNA     Post-op block assessment: catheter intact and no complications      No complications documented      Vitals:    08/03/22 1848   BP: 130/74   Pulse: 80   Resp:    Temp:    SpO2:

## 2022-08-04 VITALS
HEIGHT: 70 IN | DIASTOLIC BLOOD PRESSURE: 78 MMHG | RESPIRATION RATE: 16 BRPM | SYSTOLIC BLOOD PRESSURE: 130 MMHG | HEART RATE: 59 BPM | TEMPERATURE: 97.9 F | WEIGHT: 191 LBS | OXYGEN SATURATION: 98 % | BODY MASS INDEX: 27.35 KG/M2

## 2022-08-04 LAB
ABO GROUP BLD: NORMAL
BLD GP AB SCN SERPL QL: NEGATIVE
ERYTHROCYTE [DISTWIDTH] IN BLOOD BY AUTOMATED COUNT: 12.6 % (ref 11.6–15.1)
FETAL CELL SCN BLD QL ROSETTE: NEGATIVE
HCT VFR BLD AUTO: 32.8 % (ref 34.8–46.1)
HGB BLD-MCNC: 11 G/DL (ref 11.5–15.4)
MCH RBC QN AUTO: 30.5 PG (ref 26.8–34.3)
MCHC RBC AUTO-ENTMCNC: 33.5 G/DL (ref 31.4–37.4)
MCV RBC AUTO: 91 FL (ref 82–98)
PLATELET # BLD AUTO: 150 THOUSANDS/UL (ref 149–390)
PMV BLD AUTO: 11.4 FL (ref 8.9–12.7)
RBC # BLD AUTO: 3.61 MILLION/UL (ref 3.81–5.12)
RH BLD: NEGATIVE
RPR SER QL: NORMAL
WBC # BLD AUTO: 10.33 THOUSAND/UL (ref 4.31–10.16)

## 2022-08-04 PROCEDURE — 85027 COMPLETE CBC AUTOMATED: CPT | Performed by: STUDENT IN AN ORGANIZED HEALTH CARE EDUCATION/TRAINING PROGRAM

## 2022-08-04 PROCEDURE — 99024 POSTOP FOLLOW-UP VISIT: CPT | Performed by: OBSTETRICS & GYNECOLOGY

## 2022-08-04 PROCEDURE — 86850 RBC ANTIBODY SCREEN: CPT | Performed by: STUDENT IN AN ORGANIZED HEALTH CARE EDUCATION/TRAINING PROGRAM

## 2022-08-04 PROCEDURE — 85461 HEMOGLOBIN FETAL: CPT | Performed by: STUDENT IN AN ORGANIZED HEALTH CARE EDUCATION/TRAINING PROGRAM

## 2022-08-04 PROCEDURE — 86901 BLOOD TYPING SEROLOGIC RH(D): CPT | Performed by: STUDENT IN AN ORGANIZED HEALTH CARE EDUCATION/TRAINING PROGRAM

## 2022-08-04 PROCEDURE — 86900 BLOOD TYPING SEROLOGIC ABO: CPT | Performed by: STUDENT IN AN ORGANIZED HEALTH CARE EDUCATION/TRAINING PROGRAM

## 2022-08-04 RX ORDER — ACETAMINOPHEN 325 MG/1
650 TABLET ORAL EVERY 4 HOURS PRN
Refills: 0
Start: 2022-08-04

## 2022-08-04 RX ORDER — IBUPROFEN 600 MG/1
600 TABLET ORAL EVERY 6 HOURS PRN
Qty: 30 TABLET | Refills: 0
Start: 2022-08-04

## 2022-08-04 RX ADMIN — HUMAN RHO(D) IMMUNE GLOBULIN 300 MCG: 300 INJECTION, SOLUTION INTRAMUSCULAR at 11:48

## 2022-08-04 RX ADMIN — ACETAMINOPHEN 650 MG: 325 TABLET ORAL at 08:16

## 2022-08-04 RX ADMIN — IBUPROFEN 600 MG: 600 TABLET ORAL at 05:05

## 2022-08-04 RX ADMIN — DOCUSATE SODIUM 100 MG: 100 CAPSULE, LIQUID FILLED ORAL at 16:43

## 2022-08-04 RX ADMIN — IBUPROFEN 600 MG: 600 TABLET ORAL at 16:43

## 2022-08-04 RX ADMIN — DOCUSATE SODIUM 100 MG: 100 CAPSULE, LIQUID FILLED ORAL at 08:16

## 2022-08-04 NOTE — PLAN OF CARE
Problem: Knowledge Deficit  Goal: Verbalizes understanding of labor plan  Description: Assess patient/family/caregiver's baseline knowledge level and ability to understand information  Provide education via patient/family/caregiver's preferred learning method at appropriate level of understanding  1  Provide teaching at level of understanding  2  Provide teaching via preferred learning method(s)  Outcome: Completed  Goal: Patient/family/caregiver demonstrates understanding of disease process, treatment plan, medications, and discharge instructions  Description: Complete learning assessment and assess knowledge base  Interventions:  - Provide teaching at level of understanding  - Provide teaching via preferred learning methods  Outcome: Completed     Problem: Labor & Delivery  Goal: Manages discomfort  Description: Assess and monitor for signs and symptoms of discomfort  Assess patient's pain level regularly and per hospital policy  Administer medications as ordered  Support use of nonpharmacological methods to help control pain such as distraction, imagery, relaxation, and application of heat and cold  Collaborate with interdisciplinary team and patient to determine appropriate pain management plan  1  Include patient in decisions related to comfort  2  Offer non-pharmacological pain management interventions  3  Report ineffective pain management to physician  Outcome: Completed  Goal: Patient vital signs are stable  Description: 1  Assess vital signs - vaginal delivery    Outcome: Completed     Problem: BIRTH - VAGINAL/ SECTION  Goal: Fetal and maternal status remain reassuring during the birth process  Description: INTERVENTIONS:  - Monitor vital signs  - Monitor fetal heart rate  - Monitor uterine activity  - Monitor labor progression (vaginal delivery)  - DVT prophylaxis  - Antibiotic prophylaxis  Outcome: Completed  Goal: Emotionally satisfying birthing experience for mother/fetus  Description: Interventions:  - Assess, plan, implement and evaluate the nursing care given to the patient in labor  - Advocate the philosophy that each childbirth experience is a unique experience and support the family's chosen level of involvement and control during the labor process   - Actively participate in both the patient's and family's teaching of the birth process  - Consider cultural, Jain and age-specific factors and plan care for the patient in labor  Outcome: Completed     Problem: POSTPARTUM  Goal: Experiences normal postpartum course  Description: INTERVENTIONS:  - Monitor maternal vital signs  - Assess uterine involution and lochia  2022 by Guillaume Velasquez RN  Outcome: Progressing  8/3/2022 2218 by Guillaume Velasquez RN  Outcome: Progressing  Goal: Appropriate maternal -  bonding  Description: INTERVENTIONS:  - Identify family support  - Assess for appropriate maternal/infant bonding   -Encourage maternal/infant bonding opportunities  - Referral to  or  as needed  2022 by Guillaume Velasquez RN  Outcome: Progressing  8/3/2022 2218 by Guillaume Velasquez RN  Outcome: Progressing  Goal: Establishment of infant feeding pattern  Description: INTERVENTIONS:  - Assess breast/bottle feeding  - Refer to lactation as needed  2022 by Guillaume Velasquez RN  Outcome: Progressing  8/3/2022 2218 by Guillaume Velasquez RN  Outcome: Progressing  Goal: Incision(s), wounds(s) or drain site(s) healing without S/S of infection  Description: INTERVENTIONS  - Assess and document dressing, incision, wound bed, drain sites and surrounding tissue  - Provide patient and family education  - Perform skin care/dressing changes every  2022 by Guillaume Velasquez RN  Outcome: Progressing  8/3/2022 2218 by Guillaume Velasquez RN  Outcome: Progressing     Problem: PAIN - ADULT  Goal: Verbalizes/displays adequate comfort level or baseline comfort level  Description: Interventions:  - Encourage patient to monitor pain and request assistance  - Assess pain using appropriate pain scale  - Administer analgesics based on type and severity of pain and evaluate response  - Implement non-pharmacological measures as appropriate and evaluate response  - Consider cultural and social influences on pain and pain management  - Notify physician/advanced practitioner if interventions unsuccessful or patient reports new pain  8/4/2022 0116 by Pierce Brown RN  Outcome: Progressing  8/3/2022 2218 by Pierce Brown RN  Outcome: Progressing     Problem: INFECTION - ADULT  Goal: Absence or prevention of progression during hospitalization  Description: INTERVENTIONS:  - Assess and monitor for signs and symptoms of infection  - Monitor lab/diagnostic results  - Monitor all insertion sites, i e  indwelling lines, tubes, and drains  - Monitor endotracheal if appropriate and nasal secretions for changes in amount and color  - Ethan appropriate cooling/warming therapies per order  - Administer medications as ordered  - Instruct and encourage patient and family to use good hand hygiene technique  - Identify and instruct in appropriate isolation precautions for identified infection/condition  8/4/2022 0116 by Pierce Brown RN  Outcome: Progressing  8/3/2022 2218 by Pierce Brown RN  Outcome: Progressing  Goal: Absence of fever/infection during neutropenic period  Description: INTERVENTIONS:  - Monitor WBC    8/4/2022 0116 by Pierce Brown RN  Outcome: Progressing  8/3/2022 2218 by Pierce Brown RN  Outcome: Progressing     Problem: SAFETY ADULT  Goal: Patient will remain free of falls  Description: INTERVENTIONS:  - Educate patient/family on patient safety including physical limitations  - Instruct patient to call for assistance with activity   - Consult OT/PT to assist with strengthening/mobility   - Keep Call bell within reach  - Keep bed low and locked with side rails adjusted as appropriate  - Keep care items and personal belongings within reach  - Initiate and maintain comfort rounds  - Make Fall Risk Sign visible to staff  - Offer Toileting every  Hours, in advance of need  - Initiate/Maintain alarm  - Obtain necessary fall risk management equipment:   - Apply yellow socks and bracelet for high fall risk patients  - Consider moving patient to room near nurses station  8/4/2022 0116 by Ana Harley RN  Outcome: Progressing  8/3/2022 2218 by Ana Harley RN  Outcome: Progressing  Goal: Maintain or return to baseline ADL function  Description: INTERVENTIONS:  -  Assess patient's ability to carry out ADLs; assess patient's baseline for ADL function and identify physical deficits which impact ability to perform ADLs (bathing, care of mouth/teeth, toileting, grooming, dressing, etc )  - Assess/evaluate cause of self-care deficits   - Assess range of motion  - Assess patient's mobility; develop plan if impaired  - Assess patient's need for assistive devices and provide as appropriate  - Encourage maximum independence but intervene and supervise when necessary  - Involve family in performance of ADLs  - Assess for home care needs following discharge   - Consider OT consult to assist with ADL evaluation and planning for discharge  - Provide patient education as appropriate  8/4/2022 0116 by Ana Harley RN  Outcome: Progressing  8/3/2022 2218 by Ana Harley RN  Outcome: Progressing  Goal: Maintains/Returns to pre admission functional level  Description: INTERVENTIONS:  - Perform BMAT or MOVE assessment daily    - Set and communicate daily mobility goal to care team and patient/family/caregiver  - Collaborate with rehabilitation services on mobility goals if consulted  - Perform Range of Motion  times a day  - Reposition patient every  hours    - Dangle patient  times a day  - Stand patient  times a day  - Ambulate patient  times a day  - Out of bed to chair  times a day   - Out of bed for meals  times a day  - Out of bed for toileting  - Record patient progress and toleration of activity level   8/4/2022 0116 by Cheyenne Ferreira RN  Outcome: Progressing  8/3/2022 2218 by Cheyenne Ferreira RN  Outcome: Progressing     Problem: DISCHARGE PLANNING  Goal: Discharge to home or other facility with appropriate resources  Description: INTERVENTIONS:  - Identify barriers to discharge w/patient and caregiver  - Arrange for needed discharge resources and transportation as appropriate  - Identify discharge learning needs (meds, wound care, etc )  - Arrange for interpretive services to assist at discharge as needed  - Refer to Case Management Department for coordinating discharge planning if the patient needs post-hospital services based on physician/advanced practitioner order or complex needs related to functional status, cognitive ability, or social support system  8/4/2022 0116 by Cheyenne Ferreira RN  Outcome: Progressing  8/3/2022 2218 by Cheyenne Ferreira RN  Outcome: Progressing

## 2022-08-04 NOTE — DISCHARGE SUMMARY
Discharge Summary - OB/GYN   Ziyad Reynolds 34 y o  female MRN: [id number]  Unit/Bed#: -01 Encounter: [id number]    Admission Date: 8/3/2022     Discharge Date: 22    Principal Diagnosis:   Pregnancy at 40w1d    Secondary Diagnosis: first degree laceration    Attending - Delivery:  Mayda Lazo MD         - Discharge: Radha Rdz MD    Procedures: Vaginal, Spontaneous , 1st degree laceration repair    Anesthesia: epidural    Complications: none apparent    Hospital Course:      Ziyad Reynolds is a 34 y o   at 40w1d who was admitted with induction of labor for early labor at term  She delivered a viable male  with weight of 8lbs, 3 6oz, apgars of 9 (1 min) and 9 (5 min)   was transferred to  nursery  Patient tolerated the procedure well and was transferred to recovery in stable condition  Her postpartum course was uncomplicated  Admission hemoglobin was 12 4g/dl, postpartum was 11 0g/dl  On day of discharge, she was ambulating and able to reasonably perform all ADLs  She was voiding and had appropriate bowel function  Pain was well controlled  She was discharged home on postpartum day #1 without complications, per patient request  Patient was instructed to follow up with her OB as an outpatient and was given appropriate warnings to call provider if she develops signs of infection or uncontrolled pain  Condition at discharge: good     Discharge instructions/Information to patient and family:   See after visit summary for information provided to patient and family  Provisions for Follow-Up Care:  See after visit summary for information related to follow-up care and any pertinent home health orders  Disposition: Home    Planned Readmission: No    Discharge Medications: For a complete list of the patient's medications, please refer to her med rec      Radha Rdz MD

## 2022-08-04 NOTE — PROGRESS NOTES
Progress Note - OB/GYN  Post-Partum Physician Note   Terese Gambino 34 y o  female MRN: [id number]  Unit/Bed#:  208-01 Encounter: [id number]    Patient is postpartum day 1 from a Vaginal, Spontaneous  with a 1st degree laceration and Anesthesia: epidural    Subjective:   Pain: uterine cramping with nursing, otherwise controlled  Tolerating Oral Intake: yes  Voiding: yes  Flatus: yes  Bowel Movement: no  Ambulating: yes  Breastfeeding: Breastfeeding  Shortness of Breath: no  Leg Pain/Discomfort: no  Lochia: normal      Objective:   Vitals:    08/03/22 2325 08/04/22 0300 08/04/22 0700 08/04/22 0900   BP: 131/86 130/89 143/90 130/78   BP Location: Right arm Left arm Right arm Right arm   Pulse: 59 (!) 50 55 59   Resp: 14 18 18 16   Temp: 97 9 °F (36 6 °C) 97 9 °F (36 6 °C) 97 9 °F (36 6 °C)    TempSrc: Temporal Oral Temporal    SpO2: 98% 97% 98%    Weight:       Height:           Intake/Output Summary (Last 24 hours) at 8/4/2022 1028  Last data filed at 8/3/2022 2147  Gross per 24 hour   Intake 980 ml   Output 1156 ml   Net -176 ml       Physical Exam:  General: in no apparent distress  Abdomen: abdomen is soft without significant tenderness  Fundus: Firm, 2 below the umbilicus  Perineum: exam deferred  Lower extremeties: nontender      Labs/Tests:   Lab Results   Component Value Date    WBC 10 33 (H) 08/04/2022    HGB 11 0 (L) 08/04/2022    HCT 32 8 (L) 08/04/2022    MCV 91 08/04/2022     08/04/2022       Brief OB Lab review:  ABO Grouping   Date Value Ref Range Status   08/04/2022 A  Final      Rh Factor   Date Value Ref Range Status   08/04/2022 Negative  Final    No results found for: ANTIBODYSCR  No results found for: RUBM    MEDS:   Current Facility-Administered Medications   Medication Dose Route Frequency    acetaminophen (TYLENOL) tablet 650 mg  650 mg Oral Q4H PRN    benzocaine-menthol-lanolin-aloe (DERMOPLAST) 20-0 5 % topical spray 1 application  1 application Topical G9M PRN    calcium carbonate (TUMS) chewable tablet 1,000 mg  1,000 mg Oral Daily PRN    diphenhydrAMINE (BENADRYL) tablet 25 mg  25 mg Oral Q6H PRN    docusate sodium (COLACE) capsule 100 mg  100 mg Oral BID    hydrocortisone 1 % cream 1 application  1 application Topical Daily PRN    ibuprofen (MOTRIN) tablet 600 mg  600 mg Oral Q6H PRN    ondansetron (ZOFRAN) injection 4 mg  4 mg Intravenous Q8H PRN    Rho(D) immune globulin (RHOGAM ULTRA-FILTERED PLUS) IM injection 300 mcg  300 mcg Intramuscular Once    witch hazel-glycerin (TUCKS) topical pad 1 pad  1 pad Topical Q4H PRN     Invasive Devices  Timeline    Peripheral Intravenous Line  Duration           Peripheral IV 22 Left Forearm 1 day                Assessment and Plan:  34y o  year-old , postpartum day 1 status-post  Vaginal, Spontaneous  and 1st degree laceration  Single BP elevated this morning, pt very anxious at time, repeat normal   No sx preeclampsia  Continue routine postpartum care  Encourage ambulation  Planning discharge today    Rh negative state in antepartum period  - Rhogam will be given today prior to discharge        Barbara Kumari MD

## 2022-08-04 NOTE — LACTATION NOTE
This note was copied from a baby's chart  CONSULT - LACTATION  Baby Boy ([address]) Khris Massed 1 days male MRN: [id number]    [facility] NURSERY Room / Bed: (N)/(N) Encounter: [id number]    Maternal Information     MOTHER:  Meera Harvey  Maternal Age: 34 y o    OB History: # 1 - Date: , Sex: None, Weight: None, GA: None, Delivery: None, Apgar1: None, Apgar5: None, Living: None, Birth Comments: None    # 2 - Date: 22, Sex: Male, Weight: 3730 g (8 lb 3 6 oz), GA: 40w1d, Delivery: Vaginal, Spontaneous, Apgar1: 9, Apgar5: 9, Living: Living, Birth Comments: seen by Dr John Sim for meconium stained amniotic fluid   Previouse breast reduction surgery? No    Lactation history:   Has patient previously breast fed: No   How long had patient previously breast fed:     Previous breast feeding complications:     History reviewed  No pertinent surgical history  Birth information:  YOB: 2023   Time of birth: 5:25 PM   Sex: male   Delivery type: Vaginal, Spontaneous   Birth Weight: 3730 g (8 lb 3 6 oz)   Percent of Weight Change: 0%     Gestational Age: 44w3d   [unfilled]    Assessment     Breast and nipple assessment: large breast and short nipples and left breast has wider areola, nipple     Assessment: normal assessment    Feeding assessment: feeding well  LATCH:  Latch: Grasps breast, tongue down, lips flanged, rhythmic sucking   Audible Swallowing: Spontaneous and intermittent (24 hours old)   Type of Nipple: Everted (After stimulation)   Comfort (Breast/Nipple): Soft/non-tender   Hold (Positioning): Partial assist, teach one side, mother does other, staff holds   [facility] Score: 9          Feeding recommendations:  breast feed on demand     Met with parents, assisted with latch/position  Mother positioned herself in a laid back position  Mother was assisted in bringing baby to the bring chin first and reviewed the "tea cup"   Mother was able to take hold after a few attempts and baby fed well for 15 minutes displaying fullness cues  The Breastfeeding Discharge Booklet discussed  Discussed feeding log to continue using once home for up to the week ensuring that baby feeds 8-12x in 24 hours and that baby has 6-8 wet diapers as well as 3-4 soiled diapers (looking for stool transition from meconium to a yellow/gold seedy loose stool)  Mother given resources to look up medications to ensure they are safe with breastfeeding, by communicating with the [employer], [address] as well as using [employer] (assisted mother to pin to home screen on personal phone)    Discussed engorgement time frame (when mature milk comes in) and management as well as how to deal with conditions that may occur while breastfeeding (plugged ducts, milk blebs and mastitis) and when is appropriate to communicate with her OB/GYN and/or a lactation consultant  Discussed how to set up a pump, how to cycle (stimulation vs expression phases during a pumping session), milk storage and cleaning  Flange sit was discussed in depth as well as use of Spectra S2 breast pump that mother will be using when home  Mother shown handouts for tips on pumping when returning to work and elevated/paced bottle feeding (demonstrated)  Mother shown community resources for continued support in breastfeeding once discharged and encouraged to communicate with [employer] and/or a lactation consultant to further assistance once home  Mother was also encouraged to call insurance for lactation home visits and inquire with baby's pediatrician for lactation services that could be offered in the practice  "Breastfeeeding with Large Breasts" handout was provided to parents during encounter      Cici Villagran RN 8/4/2022 2:31 PM

## 2022-08-04 NOTE — LACTATION NOTE
This note was copied from a baby's chart  CONSULT - LACTATION  Baby Boy ([address]) Khris Massed 1 days male MRN: [id number]    [facility] NURSERY Room / Bed: (N)/(N) Encounter: [id number]    Maternal Information     MOTHER:  Meera Harvey  Maternal Age: 34 y o    OB History: # 1 - Date: , Sex: None, Weight: None, GA: None, Delivery: None, Apgar1: None, Apgar5: None, Living: None, Birth Comments: None    # 2 - Date: 22, Sex: Male, Weight: 3730 g (8 lb 3 6 oz), GA: 40w1d, Delivery: Vaginal, Spontaneous, Apgar1: 9, Apgar5: 9, Living: Living, Birth Comments: seen by Dr John Sim for meconium stained amniotic fluid   Previouse breast reduction surgery? No    Lactation history:   Has patient previously breast fed: No   How long had patient previously breast fed:     Previous breast feeding complications:     History reviewed  No pertinent surgical history  Birth information:  YOB: 2023   Time of birth: 5:25 PM   Sex: male   Delivery type: Vaginal, Spontaneous   Birth Weight: 3730 g (8 lb 3 6 oz)   Percent of Weight Change: 0%     Gestational Age: 44w3d   [unfilled]    Assessment     Breast and nipple assessment: large breast and short nipple that everts with compression, left breast tissue is noted to be larger and wider than the right side    [place] Assessment: normal assessment    Feeding assessment: latch difficulty (attempted various positions to latch on the left breast, that mother stated is hardest to latch on  Baby latched on the right breast without much assistance   Baby fed on left with laid back after trying several positions )  LATCH:  Latch: Grasps breast, tongue down, lips flanged, rhythmic sucking   Audible Swallowing: Spontaneous and intermittent (24 hours old)   Type of Nipple: Everted (After stimulation)   Comfort (Breast/Nipple): Soft/non-tender   Hold (Positioning): Partial assist, teach one side, mother does other, staff holds LATCH Score: 9          Feeding recommendations:  breast feed on demand     Met with parents to discuss feeding plan  Mother discussed that she would like to exclusively breastfeed her baby  Mother was assisted with latch and position  She stated that she is having a difficult time latching on the left side and has been using the football to latch on both sides, but is seeing that baby feeds well on the right  Suck training and muscle exercises were completed as well as an oral assessment  Mother practiced hand expression and expressed 5 drops of colostrum that was finger fed during exercises  Another 3 drops of colostrum were provided during encounter for a total of 8 drops  No tongue tie was noted  Baby had diaper changed by father prior to feeding and pacifiers were noted to be in bassinet  Educated on proper use and discouraged use until breastfeeding is established  Baby was brought to the left breast in various positions (football, cross cradle, side lying, cradle, laid back) and latch deeply for 15 minutes on the left breast in a laid back position  Baby unlatched himself and burped and was brought to the right breast in football position  Nipple was noted to be bruised  Discussed nipple care using lanolin and expressed colostrum  Mother was able to latch fairly well with minor adjustments to position and baby fed for another 5 minutes  The Ready, Set, Baby Booklet was discussed  Discussed importance of skin to skin to help baby awaken for breastfeeding and to help with milk production as well as stabilize temperature, blood sugars, decrease pain, promote relaxation, and calm the baby as well as for bonding (father may do as well)  Showed images of tummy size progression as milk production increases to meet the nutritional/growing needs of the baby and risks associated with introducing supplementation that would disrupt the process   Alternative feedings were discussed as a manner to feed baby when sleepy and/or unable to latch  Discussed Second Night Syndrome explaining how babys cluster feed to meet needs  Growth spurts explained and how cluster feeding helps boost milk supply  Explained feeding cues and fullness cues as well as importance of obtaining a deep latch for effective milk removal and proper positioning (tummy to tummy, at level, nose to nipple, bring chin to breast first and bringing baby to breast) with ear, shoulder, and hip alignment  Demonstrated on breast model, how to hold, compress and perform hand expression  Baby is having several wet diapers and had a wet and stool during encounter  He is at a 0 2% weight loss  Parents were encouraged to call for next feeding to assess position/latch  Potential discharge planned for this evening         José Miguel Lamar RN 8/4/2022 11:21 AM

## 2022-08-04 NOTE — PLAN OF CARE
Problem: POSTPARTUM  Goal: Experiences normal postpartum course  Description: INTERVENTIONS:  - Monitor maternal vital signs  - Assess uterine involution and lochia  Outcome: Progressing  Goal: Appropriate maternal -  bonding  Description: INTERVENTIONS:  - Identify family support  - Assess for appropriate maternal/infant bonding   -Encourage maternal/infant bonding opportunities  - Referral to  or  as needed  Outcome: Progressing  Goal: Establishment of infant feeding pattern  Description: INTERVENTIONS:  - Assess breast/bottle feeding  - Refer to lactation as needed  Outcome: Progressing  Goal: Incision(s), wounds(s) or drain site(s) healing without S/S of infection  Description: INTERVENTIONS  - Assess and document dressing, incision, wound bed, drain sites and surrounding tissue  - Provide patient and family education  Outcome: Progressing     Problem: PAIN - ADULT  Goal: Verbalizes/displays adequate comfort level or baseline comfort level  Description: Interventions:  - Encourage patient to monitor pain and request assistance  - Assess pain using appropriate pain scale  - Administer analgesics based on type and severity of pain and evaluate response  - Implement non-pharmacological measures as appropriate and evaluate response  - Consider cultural and social influences on pain and pain management  - Notify physician/advanced practitioner if interventions unsuccessful or patient reports new pain  Outcome: Progressing     Problem: INFECTION - ADULT  Goal: Absence or prevention of progression during hospitalization  Description: INTERVENTIONS:  - Assess and monitor for signs and symptoms of infection  - Monitor lab/diagnostic results  - Monitor all insertion sites, i e  indwelling lines, tubes, and drains  - Monitor endotracheal if appropriate and nasal secretions for changes in amount and color  - [place] appropriate cooling/warming therapies per order  - Administer medications as ordered  - Instruct and encourage patient and family to use good hand hygiene technique  - Identify and instruct in appropriate isolation precautions for identified infection/condition  Outcome: Progressing  Goal: Absence of fever/infection during neutropenic period  Description: INTERVENTIONS:  - Monitor WBC    Outcome: Progressing     Problem: INFECTION - ADULT  Goal: Absence or prevention of progression during hospitalization  Description: INTERVENTIONS:  - Assess and monitor for signs and symptoms of infection  - Monitor lab/diagnostic results  - Monitor all insertion sites, i e  indwelling lines, tubes, and drains  - Monitor endotracheal if appropriate and nasal secretions for changes in amount and color  - [place] appropriate cooling/warming therapies per order  - Administer medications as ordered  - Instruct and encourage patient and family to use good hand hygiene technique  - Identify and instruct in appropriate isolation precautions for identified infection/condition  Outcome: Progressing  Goal: Absence of fever/infection during neutropenic period  Description: INTERVENTIONS:  - Monitor WBC    Outcome: Progressing     Problem: SAFETY ADULT  Goal: Patient will remain free of falls  Description: INTERVENTIONS:  - Educate patient/family on patient safety including physical limitations  - Instruct patient to call for assistance with activity   - Consult OT/PT to assist with strengthening/mobility   - Keep Call bell within reach  - Keep bed low and locked with side rails adjusted as appropriate  - Keep care items and personal belongings within reach  - Initiate and maintain comfort rounds  - Make Fall Risk Sign visible to staff  - Apply yellow socks and bracelet for high fall risk patients  - Consider moving patient to room near nurses station  Outcome: Progressing  Goal: Maintain or return to baseline ADL function  Description: INTERVENTIONS:  -  Assess patient's ability to carry out ADLs; assess patient's baseline for ADL function and identify physical deficits which impact ability to perform ADLs (bathing, care of mouth/teeth, toileting, grooming, dressing, etc )  - Assess/evaluate cause of self-care deficits   - Assess range of motion  - Assess patient's mobility; develop plan if impaired  - Assess patient's need for assistive devices and provide as appropriate  - Encourage maximum independence but intervene and supervise when necessary  - Involve family in performance of ADLs  - Assess for home care needs following discharge   - Consider OT consult to assist with ADL evaluation and planning for discharge  - Provide patient education as appropriate  Outcome: Progressing  Goal: Maintains/Returns to pre admission functional level  Description: INTERVENTIONS:  - Perform BMAT or MOVE assessment daily    - Set and communicate daily mobility goal to care team and patient/family/caregiver     - Collaborate with rehabilitation services on mobility goals if consulted  - Out of bed for toileting  - Record patient progress and toleration of activity level   Outcome: Progressing     Problem: DISCHARGE PLANNING  Goal: Discharge to home or other facility with appropriate resources  Description: INTERVENTIONS:  - Identify barriers to discharge w/patient and caregiver  - Arrange for needed discharge resources and transportation as appropriate  - Identify discharge learning needs (meds, wound care, etc )  - Arrange for interpretive services to assist at discharge as needed  - Refer to Case Management Department for coordinating discharge planning if the patient needs post-hospital services based on physician/advanced practitioner order or complex needs related to functional status, cognitive ability, or social support system  Outcome: Progressing

## 2022-09-30 ENCOUNTER — POSTPARTUM VISIT (OUTPATIENT)
Dept: OBGYN CLINIC | Facility: CLINIC | Age: 32
End: 2022-09-30

## 2022-09-30 VITALS
HEIGHT: 70 IN | WEIGHT: 174 LBS | DIASTOLIC BLOOD PRESSURE: 74 MMHG | BODY MASS INDEX: 24.91 KG/M2 | SYSTOLIC BLOOD PRESSURE: 114 MMHG

## 2022-09-30 DIAGNOSIS — Z30.011 OCP (ORAL CONTRACEPTIVE PILLS) INITIATION: ICD-10-CM

## 2022-09-30 RX ORDER — ACETAMINOPHEN AND CODEINE PHOSPHATE 120; 12 MG/5ML; MG/5ML
1 SOLUTION ORAL DAILY
Qty: 28 TABLET | Refills: 3 | Status: SHIPPED | OUTPATIENT
Start: 2022-09-30

## 2022-09-30 NOTE — PROGRESS NOTES
[address] [place] [address], [address], [address] [facility]    Assessment/Plan:  Beba Odell is a 34y o  year old  who presents for postpartum visit  Routine Postpartum Care  1  Normal postpartum exam  2  Contraception: oral progesterone-only contraceptive  3  Depression Screen: Medium risk  Patient reports feeling tired, but overall doing well  She feels like her mood is overall good  4  Feeding: Breast  5  Cervical cancer screening Up to Date   6  Follow up in: 3 months for annual exam or as needed  Additional Problems:  1  Postpartum examination following vaginal delivery    2  OCP (oral contraceptive pills) initiation  -     norethindrone (Ortho Micronor) 0 35 MG tablet; Take 1 tablet (0 35 mg total) by mouth daily      Subjective:     CC: Postpartum visit    Gabbi Trimble is a 34 y o  y o  female  who presents for a postpartum visit  She is 8 weeks postpartum following  on 8/3/2022 at 40 weeks  Postpartum course has been uncomplicated  Bleeding no bleeding  Bowel function is normal  Bladder function is normal  Patient is not sexually active  Postpartum Depression: Medium Risk    Last EPDS Total Score: 5    Last EPDS Self Harm Result: Never       The following portions of the patient's history were reviewed and updated as appropriate: allergies, current medications, past family history, past medical history, obstetric history, gynecologic history, past social history, past surgical history and problem list     Objective:  /74 (BP Location: Left arm, Patient Position: Sitting, Cuff Size: Standard)   Ht 5' 10" (1 778 m)   Wt 78 9 kg (174 lb)   BMI 24 97 kg/m²   Pregravid Weight/BMI: No episode found (BMI Could not be calculated)  Current Weight: 78 9 kg (174 lb)   Total Weight Gain: Not found     Pre-Matt Vitals    Flowsheet Row Most Recent Value   Prenatal Assessment    Prenatal Vitals    Blood Pressure 114/74   Weight - Scale 78 9 kg (174 lb) Urine Albumin/Glucose    Dilation/Effacement/Station    Vaginal Drainage    Edema            Chaperone present? Yes: Alexia Melgoza MA  General Appearance: alert and oriented, in no acute distress  Abdomen: Soft, non-tender, non-distended, no masses, no rebound or guarding  Pelvic:       External genitalia: Normal appearance, no abnormal pigmentation, no lesions or masses  Normal [place]'s and [place]'s  Obstetric laceration well-healed  Urinary system: Urethral meatus normal, bladder non-tender  Vaginal: normal mucosa without prolapse or lesions  Normal-appearing physiologic discharge  Cervix: Normal-appearing, well-epithelialized, no gross lesions or masses  No cervical motion tenderness  Adnexa: No adnexal masses or tenderness noted  Uterus: Normal-sized, regular contour, midline, mobile, no uterine tenderness  Extremities: Normal range of motion     Skin: normal, no rash or abnormalities  Neurologic: alert, oriented x3  Psychiatric: Appropriate affect, mood stable, cooperative with exam         Beau Hughes  9/30/2022 3:39 PM

## 2022-12-08 ENCOUNTER — TELEPHONE (OUTPATIENT)
Dept: OBGYN CLINIC | Facility: CLINIC | Age: 32
End: 2022-12-08

## 2022-12-08 NOTE — TELEPHONE ENCOUNTER
Received breast pump order from from [facility]   Form complted, returned via fax and faxed to [facility]

## 2022-12-30 ENCOUNTER — ANNUAL EXAM (OUTPATIENT)
Dept: OBGYN CLINIC | Facility: CLINIC | Age: 32
End: 2022-12-30

## 2022-12-30 VITALS
SYSTOLIC BLOOD PRESSURE: 100 MMHG | BODY MASS INDEX: 26.55 KG/M2 | HEIGHT: 68 IN | DIASTOLIC BLOOD PRESSURE: 60 MMHG | WEIGHT: 175.2 LBS

## 2022-12-30 DIAGNOSIS — Z01.419 GYNECOLOGIC EXAM NORMAL: Primary | ICD-10-CM

## 2022-12-30 DIAGNOSIS — N39.3 STRESS INCONTINENCE: ICD-10-CM

## 2022-12-30 NOTE — PROGRESS NOTES
Assessment/Plan   Problem List Items Addressed This Visit        Other    Gynecologic exam normal - Primary     Pap guidelines reviewed  Pap deferred secondary to negative pap and HPV in 2022 in this low risk patient  Reviewed recommendation to wait 18 months between delivery and conception to avoid risk associated with short interval pregnancy such as IUGR  Continue prenatal vitamin while breastfeeding and trying to conceive  Patient reports has 3 months left of Micronor OCP, will call if desires more after that 3 months  Return to office for annual or as needed  Other Visit Diagnoses     Stress incontinence        Relevant Orders    Ambulatory Referral to Physical Therapy          Subjective:     Patient ID: Dione Lomax is a 34 y o  y o  female  HPI  33 yo seen for annual exam  Patient is 5 months PP, doing well  Reports breastfeeding & pumping  Has not gotten menses yet  Currently on Micronor OCP  Would like to conceive in the next few months to a year  Last pap: 1/12/2022 NILM (-)HRHPV  The following portions of the patient's history were reviewed and updated as appropriate: She  has a past medical history of Abnormal Pap smear of cervix, Migraine, UTI (urinary tract infection), and Varicella  She   Patient Active Problem List    Diagnosis Date Noted   • Gynecologic exam normal 12/30/2022   • Encounter for elective induction of labor 08/03/2022   • 40 weeks gestation of pregnancy 08/02/2022   • Rh negative state in antepartum period 04/12/2022   • 40 weeks gestation of pregnancy 01/12/2022     She  has no past surgical history on file    Her family history includes Asthma in her paternal grandfather and paternal grandmother; Dementia in her maternal grandfather; Diabetes in her paternal grandfather and paternal grandmother; Diverticulitis in her father; Heart disease in her paternal grandfather and paternal grandmother; Hyperlipidemia in her paternal grandfather and paternal grandmother; Hypertension in her mother, paternal grandfather, and paternal grandmother; No Known Problems in her brother; Pancreatic cancer in her maternal grandmother  She  reports that she has never smoked  She has never used smokeless tobacco  She reports that she does not currently use alcohol  She reports that she does not use drugs  Current Outpatient Medications   Medication Sig Dispense Refill   • norethindrone (MICRONOR) 0 35 MG tablet TAKE 1 TABLET BY MOUTH EVERY DAY 84 tablet 2   • Prenatal MV-Min-Fe Fum-FA-DHA (PRENATAL 1 PO) Take 1 tablet by mouth       No current facility-administered medications for this visit  She has No Known Allergies       Menstrual History:  OB History        2    Para   1    Term   1            AB   1    Living   1       SAB        IAB   1    Ectopic        Multiple   0    Live Births   1                  No LMP recorded  Review of Systems   Constitutional: Negative for fatigue, fever and unexpected weight change  HENT: Negative for dental problem and sinus pressure  Eyes: Negative for visual disturbance  Respiratory: Negative for cough, shortness of breath and wheezing  Cardiovascular: Negative for chest pain  Gastrointestinal: Negative for abdominal pain, blood in stool, constipation, diarrhea, nausea and vomiting  Endocrine: Negative for polydipsia  Genitourinary: Negative for difficulty urinating, dyspareunia, dysuria, frequency, hematuria, pelvic pain and urgency  Musculoskeletal: Negative for arthralgias and back pain  Neurological: Negative for dizziness, seizures, light-headedness and headaches  Psychiatric/Behavioral: Negative for suicidal ideas  The patient is not nervous/anxious          Objective:  Vitals:    22 0957   BP: 100/60   BP Location: Left arm   Patient Position: Sitting   Cuff Size: Large   Weight: 79 5 kg (175 lb 3 2 oz)   Height: 5' 7 5" (1 715 m)      Physical Exam  Constitutional:       Appearance: Normal appearance  She is well-developed  Genitourinary:      Vulva and bladder normal       No lesions in the vagina  Right Labia: No rash, tenderness, lesions or skin changes  Left Labia: No tenderness, lesions, skin changes or rash  No labial fusion noted  No inguinal adenopathy present in the right or left side  No vaginal discharge, erythema, tenderness or bleeding  Right Adnexa: not tender, not full and no mass present  Left Adnexa: not tender, not full and no mass present  No cervical motion tenderness, discharge or lesion  Uterus is not enlarged, tender or irregular  No uterine mass detected  No urethral prolapse, tenderness or mass present  Bladder is not tender  Breasts:     Breasts are symmetrical       Right: No swelling, bleeding, inverted nipple, mass, nipple discharge, skin change or tenderness  Left: No swelling, bleeding, inverted nipple, mass, nipple discharge, skin change or tenderness  HENT:      Head: Normocephalic and atraumatic  Neck:      Thyroid: No thyromegaly  Cardiovascular:      Rate and Rhythm: Normal rate and regular rhythm  Heart sounds: Normal heart sounds  No murmur heard  No friction rub  No gallop  Pulmonary:      Effort: Pulmonary effort is normal  No respiratory distress  Breath sounds: Normal breath sounds  No wheezing  Abdominal:      General: There is no distension  Palpations: Abdomen is soft  There is no mass  Tenderness: There is no abdominal tenderness  There is no guarding or rebound  Hernia: No hernia is present  Lymphadenopathy:      Cervical: No cervical adenopathy  Upper Body:      Right upper body: No supraclavicular, axillary or pectoral adenopathy  Left upper body: No supraclavicular, axillary or pectoral adenopathy  Lower Body: No right inguinal adenopathy  No left inguinal adenopathy     Neurological:      Mental Status: She is alert and oriented to person, place, and time  Skin:     General: Skin is warm and dry     Psychiatric:         Behavior: Behavior normal

## 2022-12-30 NOTE — ASSESSMENT & PLAN NOTE
Pap guidelines reviewed  Pap deferred secondary to negative pap and HPV in 2022 in this low risk patient  Reviewed recommendation to wait 18 months between delivery and conception to avoid risk associated with short interval pregnancy such as IUGR  Continue prenatal vitamin while breastfeeding and trying to conceive  Patient reports has 3 months left of Micronor OCP, will call if desires more after that 3 months  Return to office for annual or as needed

## 2023-02-28 PROBLEM — Z01.419 GYNECOLOGIC EXAM NORMAL: Status: RESOLVED | Noted: 2022-12-30 | Resolved: 2023-02-28

## 2023-03-02 ENCOUNTER — TELEPHONE (OUTPATIENT)
Dept: OTHER | Facility: OTHER | Age: 33
End: 2023-03-02

## 2023-03-02 NOTE — TELEPHONE ENCOUNTER
Pt called in stating she would like to schedule a new pt sick appointment with the office  Pt is c/o Sinusitis sx's   Please advise

## 2023-03-03 ENCOUNTER — OFFICE VISIT (OUTPATIENT)
Dept: FAMILY MEDICINE CLINIC | Facility: CLINIC | Age: 33
End: 2023-03-03

## 2023-03-03 VITALS
SYSTOLIC BLOOD PRESSURE: 116 MMHG | RESPIRATION RATE: 18 BRPM | OXYGEN SATURATION: 98 % | HEIGHT: 68 IN | WEIGHT: 168.2 LBS | HEART RATE: 61 BPM | TEMPERATURE: 97.8 F | DIASTOLIC BLOOD PRESSURE: 88 MMHG | BODY MASS INDEX: 25.49 KG/M2

## 2023-03-03 DIAGNOSIS — H65.01 NON-RECURRENT ACUTE SEROUS OTITIS MEDIA OF RIGHT EAR: ICD-10-CM

## 2023-03-03 DIAGNOSIS — H93.8X1 PRESSURE SENSATION IN RIGHT EAR: ICD-10-CM

## 2023-03-03 DIAGNOSIS — Z00.00 ANNUAL PHYSICAL EXAM: Primary | ICD-10-CM

## 2023-03-03 DIAGNOSIS — J34.89 SINUS PRESSURE: ICD-10-CM

## 2023-03-03 DIAGNOSIS — J01.80 ACUTE NON-RECURRENT SINUSITIS OF OTHER SINUS: ICD-10-CM

## 2023-03-03 PROBLEM — Z3A.40 40 WEEKS GESTATION OF PREGNANCY: Status: RESOLVED | Noted: 2022-08-02 | Resolved: 2023-03-03

## 2023-03-03 RX ORDER — AMOXICILLIN AND CLAVULANATE POTASSIUM 875; 125 MG/1; MG/1
1 TABLET, FILM COATED ORAL EVERY 12 HOURS SCHEDULED
Qty: 14 TABLET | Refills: 0 | Status: SHIPPED | OUTPATIENT
Start: 2023-03-03 | End: 2023-03-10

## 2023-03-03 RX ORDER — METHYLPREDNISOLONE 4 MG/1
TABLET ORAL
Qty: 1 EACH | Refills: 0 | Status: SHIPPED | OUTPATIENT
Start: 2023-03-03

## 2023-03-03 NOTE — PROGRESS NOTES
[facility]    NAME: Henok Blank  AGE: 34 y o  SEX: female  : 1990     DATE: 3/3/2023     Assessment and Plan:     Problem List Items Addressed This Visit    None  Visit Diagnoses     Annual physical exam    -  Primary    Acute non-recurrent sinusitis of other sinus        Relevant Medications    methylPREDNISolone 4 MG tablet therapy pack    amoxicillin-clavulanate (AUGMENTIN) 875-125 mg per tablet    Sinus pressure        Relevant Medications    methylPREDNISolone 4 MG tablet therapy pack    Pressure sensation in right ear        Relevant Medications    methylPREDNISolone 4 MG tablet therapy pack    Non-recurrent acute serous otitis media of right ear        Relevant Medications    methylPREDNISolone 4 MG tablet therapy pack      Patient will continue nasal saline washes  Could benefit from Flonase OTC  Instructed to call with any questions, concerns, persistent or worsening symptoms  Immunizations and preventive care screenings were discussed with patient today  Appropriate education was printed on patient's after visit summary  Counseling:  Alcohol/drug use: discussed moderation in alcohol intake, the recommendations for healthy alcohol use, and avoidance of illicit drug use  Dental Health: discussed importance of regular tooth brushing, flossing, and dental visits  · Exercise: the importance of regular exercise/physical activity was discussed  Recommend exercise 3-5 times per week for at least 30 minutes  Return in about 1 year (around 3/3/2024) for Annual physical      Chief Complaint:     Chief Complaint   Patient presents with   • New Patient Visit     Establish care   • Sinus Problem     Pt reports sinus pressure, headache, cough, ear pain for about a week   Tried saline sprays, neti pot, OTC remedys without relief)      History of Present Illness:     Adult Annual Physical   Patient here for a comprehensive physical exam  The patient reports Sinusitis symptoms x1 week  Diet and Physical Activity  · Diet/Nutrition: well balanced diet  · Exercise: 5-7 times a week on average  Depression Screening  PHQ-2/9 Depression Screening    Little interest or pleasure in doing things: 0 - not at all  Feeling down, depressed, or hopeless: 0 - not at all  PHQ-2 Score: 0  PHQ-2 Interpretation: Negative depression screen       General Health  · Sleep: gets 7-8 hours of sleep on average  · Hearing: normal - bilateral   · Vision: wears contacts  · Dental: regular dental visits  /GYN Health  · Last menstrual period: None since pregnancy  ·      Review of Systems:     Review of Systems   Constitutional: Negative  Negative for chills and fever  HENT: Positive for congestion, postnasal drip, sinus pressure, sinus pain and sore throat  Respiratory: Positive for cough  Negative for shortness of breath  Cardiovascular: Negative for chest pain  Gastrointestinal: Negative  Genitourinary: Negative  Neurological: Negative  Hematological: Negative for adenopathy  Psychiatric/Behavioral: Negative for self-injury and suicidal ideas  All other systems reviewed and are negative  Past Medical History:     Past Medical History:   Diagnosis Date   • Abnormal Pap smear of cervix     just follow up   • Migraine    • UTI (urinary tract infection)    • Varicella     chicken pox as child      Past Surgical History:     History reviewed  No pertinent surgical history     Social History:     Social History     Socioeconomic History   • Marital status: /Civil Union     Spouse name: None   • Number of children: None   • Years of education: None   • Highest education level: None   Occupational History   • None   Tobacco Use   • Smoking status: Never   • Smokeless tobacco: Never   Vaping Use   • Vaping Use: Never used   Substance and Sexual Activity   • Alcohol use: Not Currently   • Drug use: Never   • Sexual activity: Yes     Partners: Male     Birth control/protection: OCP     Comment: no new partner in past year   Other Topics Concern   • None   Social History Narrative   • None     Social Determinants of Health     Financial Resource Strain: Not on file   Food Insecurity: Not on file   Transportation Needs: Not on file   Physical Activity: Not on file   Stress: Not on file   Social Connections: Not on file   Intimate Partner Violence: Not on file   Housing Stability: Not on file      Family History:     Family History   Problem Relation Age of Onset   • Hypertension Mother    • Diverticulitis Father    • No Known Problems Brother    • Pancreatic cancer Maternal Grandmother    • Dementia Maternal Grandfather    • Heart disease Paternal Grandmother    • Diabetes Paternal Grandmother    • Hypertension Paternal Grandmother    • Hyperlipidemia Paternal Grandmother    • Asthma Paternal Grandmother    • Heart disease Paternal Grandfather    • Diabetes Paternal Grandfather    • Hypertension Paternal Grandfather    • Hyperlipidemia Paternal Grandfather    • Asthma Paternal Grandfather       Current Medications:     Current Outpatient Medications   Medication Sig Dispense Refill   • amoxicillin-clavulanate (AUGMENTIN) 875-125 mg per tablet Take 1 tablet by mouth every 12 (twelve) hours for 7 days 14 tablet 0   • methylPREDNISolone 4 MG tablet therapy pack Use as directed on package 1 each 0   • Prenatal MV-Min-Fe Fum-FA-DHA (PRENATAL 1 PO) Take 1 tablet by mouth       No current facility-administered medications for this visit  Allergies:     No Known Allergies   Physical Exam:     /88 (BP Location: Left arm, Patient Position: Sitting, Cuff Size: Large)   Pulse 61   Temp 97 8 °F (36 6 °C) (Tympanic)   Resp 18   Ht 5' 7 5" (1 715 m)   Wt 76 3 kg (168 lb 3 2 oz)   SpO2 98%   Breastfeeding Yes   BMI 25 96 kg/m²     Physical Exam  Vitals and nursing note reviewed     Constitutional: General: She is not in acute distress  Appearance: She is not ill-appearing, toxic-appearing or diaphoretic  HENT:      Head: Normocephalic  Right Ear: Ear canal and external ear normal  There is no impacted cerumen  Left Ear: Tympanic membrane, ear canal and external ear normal  There is no impacted cerumen  Ears:      Comments: Right effusion/serous otitis  Nose: Congestion present  Comments: Bilateral maxillary and frontal sinus pain pressure greater on right  Mouth/Throat:      Mouth: Mucous membranes are moist       Pharynx: Oropharynx is clear  Posterior oropharyngeal erythema present  No oropharyngeal exudate  Eyes:      General: No scleral icterus  Conjunctiva/sclera: Conjunctivae normal    Cardiovascular:      Rate and Rhythm: Normal rate and regular rhythm  Heart sounds: Normal heart sounds  No murmur heard  Pulmonary:      Effort: Pulmonary effort is normal       Breath sounds: Normal breath sounds  Abdominal:      General: Bowel sounds are normal       Palpations: Abdomen is soft  Tenderness: There is no abdominal tenderness  Musculoskeletal:      Cervical back: Neck supple  Right lower leg: No edema  Left lower leg: No edema  Lymphadenopathy:      Cervical: No cervical adenopathy  Skin:     General: Skin is warm and dry  Neurological:      Mental Status: She is alert and oriented to person, place, and time     Psychiatric:         Mood and Affect: Mood normal           Jeremy Queen PA-C   [facility] [address] [facility]

## 2023-03-03 NOTE — PATIENT INSTRUCTIONS
Fluid In The Ear (Serous Otitis Media)   WHAT YOU NEED TO KNOW:   JENNIFER is fluid trapped in the middle of your ear behind your eardrum  This condition usually develops without signs or symptoms of an ear infection  Serous otitis media may be caused by an upper respiratory infection or allergies  It is most common in the fall and early spring  DISCHARGE INSTRUCTIONS:   Call your doctor if:   • You develop severe ear pain  • The outside of your ear is red or swollen  • You have fluid coming from your ear  • You have questions or concerns about your condition or care  How to stay healthy:   • Wash your hands often throughout the day  Use soap and water  Rub your soapy hands together, lacing your fingers, for at least 20 seconds  Rinse with warm, running water  Dry your hands with a clean towel or paper towel  Use hand  that contains alcohol if soap and water are not available  Teach children how to wash their hands and use hand   • Avoid people who are sick  Some germs are easily and quickly spread through contact  Follow up with your doctor as directed:  Write down your questions so you remember to ask them during your visits  © Copyright Rupert Charles 2022 Information is for End User's use only and may not be sold, redistributed or otherwise used for commercial purposes  The above information is an  only  It is not intended as medical advice for individual conditions or treatments  Talk to your doctor, nurse or pharmacist before following any medical regimen to see if it is safe and effective for you  Wellness Visit for Adults   AMBULATORY CARE:   A wellness visit  is when you see your healthcare provider to get screened for health problems  Your healthcare provider will also give you advice on how to stay healthy  Write down your questions so you remember to ask them  Ask your healthcare provider how often you should have a wellness visit    What happens at a wellness visit:  Your healthcare provider will ask about your health, and your family history of health problems  This includes high blood pressure, heart disease, and cancer  He or she will ask if you have symptoms that concern you, if you smoke, and about your mood  You may also be asked about your intake of medicines, supplements, food, and alcohol  Any of the following may be done:  • Your weight  will be checked  Your height may also be checked so your body mass index (BMI) can be calculated  Your BMI shows if you are at a healthy weight  • Your blood pressure  and heart rate will be checked  Your temperature may also be checked  • Blood and urine tests  may be done  Blood tests may be done to check your cholesterol levels  Abnormal cholesterol levels increase your risk for heart disease and stroke  You may also need a blood or urine test to check for diabetes if you are at increased risk  Urine tests may be done to look for signs of an infection or kidney disease  • A physical exam  includes checking your heartbeat and lungs with a stethoscope  Your healthcare provider may also check your skin to look for sun damage  • Screening tests  may be recommended  A screening test is done to check for diseases that may not cause symptoms  The screening tests you may need depend on your age, gender, family history, and lifestyle habits  For example, colorectal screening may be recommended if you are 48years old or older  Screening tests you need if you are a woman:   • A Pap smear  is used to screen for cervical cancer  Pap smears are usually done every 3 to 5 years depending on your age  You may need them more often if you have had abnormal Pap smear test results in the past  Ask your healthcare provider how often you should have a Pap smear  • A mammogram  is an x-ray of your breasts to screen for breast cancer  Experts recommend mammograms every 2 years starting at age 48 years   You may need a mammogram at age 52 years or younger if you have an increased risk for breast cancer  Talk to your healthcare provider about when you should start having mammograms and how often you need them  Vaccines you may need:   • Get an influenza vaccine  every year  The influenza vaccine protects you from the flu  Several types of viruses cause the flu  The viruses change over time, so new vaccines are made each year  • Get a tetanus-diphtheria (Td) booster vaccine  every 10 years  This vaccine protects you against tetanus and diphtheria  Tetanus is a severe infection that may cause painful muscle spasms and lockjaw  Diphtheria is a severe bacterial infection that causes a thick covering in the back of your mouth and throat  • Get a human papillomavirus (HPV) vaccine  if you are female and aged 23 to 32 or male 23 to 24 and never received it  This vaccine protects you from HPV infection  HPV is the most common infection spread by sexual contact  HPV may also cause vaginal, penile, and anal cancers  • Get a pneumococcal vaccine  if you are aged 72 years or older  The pneumococcal vaccine is an injection given to protect you from pneumococcal disease  Pneumococcal disease is an infection caused by pneumococcal bacteria  The infection may cause pneumonia, meningitis, or an ear infection  • Get a shingles vaccine  if you are 60 or older, even if you have had shingles before  The shingles vaccine is an injection to protect you from the varicella-zoster virus  This is the same virus that causes chickenpox  Shingles is a painful rash that develops in people who had chickenpox or have been exposed to the virus  How to eat healthy:  My Plate is a model for planning healthy meals  It shows the types and amounts of foods that should go on your plate  Fruits and vegetables make up about half of your plate, and grains and protein make up the other half  A serving of dairy is included on the side of your plate   The amount of calories and serving sizes you need depends on your age, gender, weight, and height  Examples of healthy foods are listed below:  • Eat a variety of vegetables  such as dark green, red, and orange vegetables  You can also include canned vegetables low in sodium (salt) and frozen vegetables without added butter or sauces  • Eat a variety of fresh fruits , canned fruit in 100% juice, frozen fruit, and dried fruit  • Include whole grains  At least half of the grains you eat should be whole grains  Examples include whole-wheat bread, wheat pasta, brown rice, and whole-grain cereals such as oatmeal     • Eat a variety of protein foods such as seafood (fish and shellfish), lean meat, and poultry without skin (turkey and chicken)  Examples of lean meats include pork leg, shoulder, or tenderloin, and beef round, sirloin, tenderloin, and extra lean ground beef  Other protein foods include eggs and egg substitutes, beans, peas, soy products, nuts, and seeds  • Choose low-fat dairy products such as skim or 1% milk or low-fat yogurt, cheese, and cottage cheese  • Limit unhealthy fats  such as butter, hard margarine, and shortening  Exercise:  Exercise at least 30 minutes per day on most days of the week  Some examples of exercise include walking, biking, dancing, and swimming  You can also fit in more physical activity by taking the stairs instead of the elevator or parking farther away from stores  Include muscle strengthening activities 2 days each week  Regular exercise provides many health benefits  It helps you manage your weight, and decreases your risk for type 2 diabetes, heart disease, stroke, and high blood pressure  Exercise can also help improve your mood  Ask your healthcare provider about the best exercise plan for you  General health and safety guidelines:   • Do not smoke  Nicotine and other chemicals in cigarettes and cigars can cause lung damage   Ask your healthcare provider for information if you currently smoke and need help to quit  E-cigarettes or smokeless tobacco still contain nicotine  Talk to your healthcare provider before you use these products  • Limit alcohol  A drink of alcohol is 12 ounces of beer, 5 ounces of wine, or 1½ ounces of liquor  • Lose weight, if needed  Being overweight increases your risk of certain health conditions  These include heart disease, high blood pressure, type 2 diabetes, and certain types of cancer  • Protect your skin  Do not sunbathe or use tanning beds  Use sunscreen with a SPF 15 or higher  Apply sunscreen at least 15 minutes before you go outside  Reapply sunscreen every 2 hours  Wear protective clothing, hats, and sunglasses when you are outside  • Drive safely  Always wear your seatbelt  Make sure everyone in your car wears a seatbelt  A seatbelt can save your life if you are in an accident  Do not use your cell phone when you are driving  This could distract you and cause an accident  Pull over if you need to make a call or send a text message  • Practice safe sex  Use latex condoms if are sexually active and have more than one partner  Your healthcare provider may recommend screening tests for sexually transmitted infections (STIs)  • Wear helmets, lifejackets, and protective gear  Always wear a helmet when you ride a bike or motorcycle, go skiing, or play sports that could cause a head injury  Wear protective equipment when you play sports  Wear a lifejacket when you are on a boat or doing water sports  © Copyright Tess Found 2022 Information is for End User's use only and may not be sold, redistributed or otherwise used for commercial purposes  The above information is an  only  It is not intended as medical advice for individual conditions or treatments  Talk to your doctor, nurse or pharmacist before following any medical regimen to see if it is safe and effective for you      Wellness Visit for Adults   AMBULATORY CARE:   A wellness visit  is when you see your healthcare provider to get screened for health problems  Your healthcare provider will also give you advice on how to stay healthy  Write down your questions so you remember to ask them  Ask your healthcare provider how often you should have a wellness visit  What happens at a wellness visit:  Your healthcare provider will ask about your health, and your family history of health problems  This includes high blood pressure, heart disease, and cancer  He or she will ask if you have symptoms that concern you, if you smoke, and about your mood  You may also be asked about your intake of medicines, supplements, food, and alcohol  Any of the following may be done:  • Your weight  will be checked  Your height may also be checked so your body mass index (BMI) can be calculated  Your BMI shows if you are at a healthy weight  • Your blood pressure  and heart rate will be checked  Your temperature may also be checked  • Blood and urine tests  may be done  Blood tests may be done to check your cholesterol levels  Abnormal cholesterol levels increase your risk for heart disease and stroke  You may also need a blood or urine test to check for diabetes if you are at increased risk  Urine tests may be done to look for signs of an infection or kidney disease  • A physical exam  includes checking your heartbeat and lungs with a stethoscope  Your healthcare provider may also check your skin to look for sun damage  • Screening tests  may be recommended  A screening test is done to check for diseases that may not cause symptoms  The screening tests you may need depend on your age, gender, family history, and lifestyle habits  For example, colorectal screening may be recommended if you are 48years old or older  Screening tests you need if you are a woman:   • A Pap smear  is used to screen for cervical cancer   Pap smears are usually done every 3 to 5 years depending on your age  You may need them more often if you have had abnormal Pap smear test results in the past  Ask your healthcare provider how often you should have a Pap smear  • A mammogram  is an x-ray of your breasts to screen for breast cancer  Experts recommend mammograms every 2 years starting at age 48 years  You may need a mammogram at age 52 years or younger if you have an increased risk for breast cancer  Talk to your healthcare provider about when you should start having mammograms and how often you need them  Vaccines you may need:   • Get an influenza vaccine  every year  The influenza vaccine protects you from the flu  Several types of viruses cause the flu  The viruses change over time, so new vaccines are made each year  • Get a tetanus-diphtheria (Td) booster vaccine  every 10 years  This vaccine protects you against tetanus and diphtheria  Tetanus is a severe infection that may cause painful muscle spasms and lockjaw  Diphtheria is a severe bacterial infection that causes a thick covering in the back of your mouth and throat  • Get a human papillomavirus (HPV) vaccine  if you are female and aged 23 to 32 or male 23 to 24 and never received it  This vaccine protects you from HPV infection  HPV is the most common infection spread by sexual contact  HPV may also cause vaginal, penile, and anal cancers  • Get a pneumococcal vaccine  if you are aged 72 years or older  The pneumococcal vaccine is an injection given to protect you from pneumococcal disease  Pneumococcal disease is an infection caused by pneumococcal bacteria  The infection may cause pneumonia, meningitis, or an ear infection  • Get a shingles vaccine  if you are 60 or older, even if you have had shingles before  The shingles vaccine is an injection to protect you from the varicella-zoster virus  This is the same virus that causes chickenpox   Shingles is a painful rash that develops in people who had chickenpox or have been exposed to the virus  How to eat healthy:  My Plate is a model for planning healthy meals  It shows the types and amounts of foods that should go on your plate  Fruits and vegetables make up about half of your plate, and grains and protein make up the other half  A serving of dairy is included on the side of your plate  The amount of calories and serving sizes you need depends on your age, gender, weight, and height  Examples of healthy foods are listed below:  • Eat a variety of vegetables  such as dark green, red, and orange vegetables  You can also include canned vegetables low in sodium (salt) and frozen vegetables without added butter or sauces  • Eat a variety of fresh fruits , canned fruit in 100% juice, frozen fruit, and dried fruit  • Include whole grains  At least half of the grains you eat should be whole grains  Examples include whole-wheat bread, wheat pasta, brown rice, and whole-grain cereals such as oatmeal     • Eat a variety of protein foods such as seafood (fish and shellfish), lean meat, and poultry without skin (turkey and chicken)  Examples of lean meats include pork leg, shoulder, or tenderloin, and beef round, sirloin, tenderloin, and extra lean ground beef  Other protein foods include eggs and egg substitutes, beans, peas, soy products, nuts, and seeds  • Choose low-fat dairy products such as skim or 1% milk or low-fat yogurt, cheese, and cottage cheese  • Limit unhealthy fats  such as butter, hard margarine, and shortening  Exercise:  Exercise at least 30 minutes per day on most days of the week  Some examples of exercise include walking, biking, dancing, and swimming  You can also fit in more physical activity by taking the stairs instead of the elevator or parking farther away from stores  Include muscle strengthening activities 2 days each week  Regular exercise provides many health benefits   It helps you manage your weight, and decreases your risk for type 2 diabetes, heart disease, stroke, and high blood pressure  Exercise can also help improve your mood  Ask your healthcare provider about the best exercise plan for you  General health and safety guidelines:   • Do not smoke  Nicotine and other chemicals in cigarettes and cigars can cause lung damage  Ask your healthcare provider for information if you currently smoke and need help to quit  E-cigarettes or smokeless tobacco still contain nicotine  Talk to your healthcare provider before you use these products  • Limit alcohol  A drink of alcohol is 12 ounces of beer, 5 ounces of wine, or 1½ ounces of liquor  • Lose weight, if needed  Being overweight increases your risk of certain health conditions  These include heart disease, high blood pressure, type 2 diabetes, and certain types of cancer  • Protect your skin  Do not sunbathe or use tanning beds  Use sunscreen with a SPF 15 or higher  Apply sunscreen at least 15 minutes before you go outside  Reapply sunscreen every 2 hours  Wear protective clothing, hats, and sunglasses when you are outside  • Drive safely  Always wear your seatbelt  Make sure everyone in your car wears a seatbelt  A seatbelt can save your life if you are in an accident  Do not use your cell phone when you are driving  This could distract you and cause an accident  Pull over if you need to make a call or send a text message  • Practice safe sex  Use latex condoms if are sexually active and have more than one partner  Your healthcare provider may recommend screening tests for sexually transmitted infections (STIs)  • Wear helmets, lifejackets, and protective gear  Always wear a helmet when you ride a bike or motorcycle, go skiing, or play sports that could cause a head injury  Wear protective equipment when you play sports  Wear a lifejacket when you are on a boat or doing water sports      © Copyright Randolph Hazzayra 2022 Information is for End User's use only and may not be sold, redistributed or otherwise used for commercial purposes  The above information is an  only  It is not intended as medical advice for individual conditions or treatments  Talk to your doctor, nurse or pharmacist before following any medical regimen to see if it is safe and effective for you

## 2023-09-03 ENCOUNTER — TELEPHONE (OUTPATIENT)
Dept: OBGYN CLINIC | Facility: CLINIC | Age: 33
End: 2023-09-03

## 2023-09-03 NOTE — TELEPHONE ENCOUNTER
Pt called reporting that she is approximately 6 weeks pregnant by LMP/HPT and noticed pink and red spotting today. She denies pain or cramps. Recommend that she monitor her bleeding and if it gets heavier or she begins to have pain then she should be seen in the ER. Otherwise, call the office for an appointment this week for further evaluation.

## 2023-09-05 ENCOUNTER — OFFICE VISIT (OUTPATIENT)
Dept: OBGYN CLINIC | Facility: CLINIC | Age: 33
End: 2023-09-05
Payer: COMMERCIAL

## 2023-09-05 VITALS
SYSTOLIC BLOOD PRESSURE: 128 MMHG | WEIGHT: 171 LBS | DIASTOLIC BLOOD PRESSURE: 70 MMHG | HEIGHT: 68 IN | BODY MASS INDEX: 25.91 KG/M2

## 2023-09-05 DIAGNOSIS — Z32.01 PREGNANCY EXAMINATION OR TEST, POSITIVE RESULT: ICD-10-CM

## 2023-09-05 DIAGNOSIS — O20.9 BLEEDING IN EARLY PREGNANCY: Primary | ICD-10-CM

## 2023-09-05 LAB — SL AMB POCT URINE HCG: POSITIVE

## 2023-09-05 PROCEDURE — 99213 OFFICE O/P EST LOW 20 MIN: CPT | Performed by: OBSTETRICS & GYNECOLOGY

## 2023-09-05 PROCEDURE — 81025 URINE PREGNANCY TEST: CPT | Performed by: OBSTETRICS & GYNECOLOGY

## 2023-09-05 PROCEDURE — 76817 TRANSVAGINAL US OBSTETRIC: CPT | Performed by: OBSTETRICS & GYNECOLOGY

## 2023-09-05 NOTE — TELEPHONE ENCOUNTER
Rec'd message from patient stating that she spoke with the on-call provider over the weekend (Dr. Sumit Quintanilla) regarding red spotting and is about 6 wks pregnant. Dr. Sumit Quintanilla advised her to contact the office  Tuesday morning when office opens to schedule an appointment for further evaluation. Patient scheduled for today 09/05/23 at 12:40p.

## 2023-09-21 ENCOUNTER — TELEPHONE (OUTPATIENT)
Dept: OBGYN CLINIC | Facility: CLINIC | Age: 33
End: 2023-09-21

## 2023-09-21 NOTE — PROGRESS NOTES
[address] [place] [address], [address], [facility], [address] [facility],[address]    Assessment/Plan:  1. Bleeding in early pregnancy  Assessment & Plan:  No active bleeding noted. TV U/S shows an IUP with a yolk sac and fetal pole consistent with 6w 0d. No adnexal masses are seen. There appears to be FHT. This is consistent with her LMP. Pt is reassured and first PN visit is scheduled. 2. Pregnancy examination or test, positive result  -     POCT urine HCG      Subjective:   Ronda Myers is a 28 y.o.  . CC:   Chief Complaint   Patient presents with   • Gynecologic Exam     Bleeding prenatal.Positive HPT 2 weeks ago. Started bleeding like a period on Saturday for about 7 hours. HPI: Pt had bleeding 3 days ago for 7 hours total, which has subsided. ROS: Negative except as noted in HPI    Patient's last menstrual period was 2023 (exact date). She  reports being sexually active and has had partner(s) who are male. She reports using the following method of birth control/protection: None. The following portions of the patient's history were reviewed and updated as appropriate:   Past Medical History:   Diagnosis Date   • Abnormal Pap smear of cervix     just follow up   • Migraine    • UTI (urinary tract infection)    • Varicella     chicken pox as child     History reviewed. No pertinent surgical history.   Family History   Problem Relation Age of Onset   • Hypertension Mother    • Diverticulitis Father    • No Known Problems Brother    • Pancreatic cancer Maternal Grandmother    • Dementia Maternal Grandfather    • Heart disease Paternal Grandmother    • Diabetes Paternal Grandmother    • Hypertension Paternal Grandmother    • Hyperlipidemia Paternal Grandmother    • Asthma Paternal Grandmother    • Heart disease Paternal Grandfather    • Diabetes Paternal Grandfather    • Hypertension Paternal Grandfather    • Hyperlipidemia Paternal Grandfather    • Asthma Paternal Grandfather      Social History     Socioeconomic History   • Marital status: /Civil Union     Spouse name: None   • Number of children: None   • Years of education: None   • Highest education level: None   Occupational History   • None   Tobacco Use   • Smoking status: Never   • Smokeless tobacco: Never   Vaping Use   • Vaping Use: Never used   Substance and Sexual Activity   • Alcohol use: Not Currently   • Drug use: Never   • Sexual activity: Yes     Partners: Male     Birth control/protection: None     Comment: no new partner in past year   Other Topics Concern   • None   Social History Narrative   • None     Social Determinants of Health     Financial Resource Strain: Not on file   Food Insecurity: Not on file   Transportation Needs: Not on file   Physical Activity: Not on file   Stress: Not on file   Social Connections: Not on file   Intimate Partner Violence: Not on file   Housing Stability: Not on file     Outpatient Medications Marked as Taking for the 9/5/23 encounter (Office Visit) with Octavio Moreau MD   Medication   • Prenatal MV-Min-Fe Fum-FA-DHA (PRENATAL 1 PO)     No Known Allergies        Objective:  /70   Ht 5' 7.75" (1.721 m)   Wt 77.6 kg (171 lb)   LMP 07/24/2023 (Exact Date)   Breastfeeding No   BMI 26.19 kg/m²        Chaperone present? Yes: .    General Appearance: alert and oriented, in no acute distress. Abdomen: Soft, non-tender, non-distended, no masses, no rebound or guarding. Pelvic:       External genitalia: Normal appearance, no abnormal pigmentation, no lesions or masses. Normal Bartholin's and [place]'s. Urinary system: Urethral meatus normal, bladder non-tender. Vaginal: normal mucosa without prolapse or lesions. Normal-appearing physiologic discharge. Closed os      Cervix: Normal-appearing, well-epithelialized, no gross lesions or masses. No cervical motion tenderness. Adnexa: No adnexal masses or tenderness noted.       Uterus: 6 week sized, regular contour, midline, mobile, no uterine tenderness. Extremities: Normal range of motion.    Skin: normal, no rash or abnormalities  Neurologic: alert, oriented x3  Psychiatric: Appropriate affect, mood stable, cooperative with exam.        Jonathon Herrera MD

## 2023-09-21 NOTE — TELEPHONE ENCOUNTER
Pt is scheduled to be seen for a First PN on 23.  with LMP 23, pt approximately 8w3d GA    Pt was seen on 23 for bleeding early in pregnancy at approx 6 weeks. Pt called the office today and spoke with  requesting an appointment for concerns of ongoing spotting for the past week. Reception scheduled pt for 23. Reception called nursing to inform patient called for bleeding concerns. This nurse placed a call  to patient to further assess bleeding concerns. Pt reports her spotting stopped after her appointment on 23 but intermittently started spotting again on 23. Pt reports she has been having brown spotting for the past week with no pelvic pain or cramping. Today, spotting has become heavier, now having  dark red-brown light bleeding upon wiping, wearing a light pad, and  "very" light cramping" with no pelvic pain. Pt has continued pelvic rest since seen on 23 as recommended. Bleeding precautions reviewed with patient, if she develops pelvic pain or heavier bleeding (saturating a pad an hour or less) passing clots, proceed to ER for further evaluation. Keep appointment for 23 as scheduled.

## 2023-09-21 NOTE — ASSESSMENT & PLAN NOTE
No active bleeding noted. TV U/S shows an IUP with a yolk sac and fetal pole consistent with 6w 0d. No adnexal masses are seen. There appears to be FHT. This is consistent with her LMP. Pt is reassured and first PN visit is scheduled.

## 2023-09-22 ENCOUNTER — OFFICE VISIT (OUTPATIENT)
Dept: OBGYN CLINIC | Facility: CLINIC | Age: 33
End: 2023-09-22
Payer: COMMERCIAL

## 2023-09-22 ENCOUNTER — TELEPHONE (OUTPATIENT)
Dept: OBGYN CLINIC | Facility: CLINIC | Age: 33
End: 2023-09-22

## 2023-09-22 VITALS
DIASTOLIC BLOOD PRESSURE: 60 MMHG | WEIGHT: 176.8 LBS | BODY MASS INDEX: 26.8 KG/M2 | HEIGHT: 68 IN | SYSTOLIC BLOOD PRESSURE: 102 MMHG

## 2023-09-22 DIAGNOSIS — O26.899 RH NEGATIVE STATE IN ANTEPARTUM PERIOD: ICD-10-CM

## 2023-09-22 DIAGNOSIS — O20.9 BLEEDING IN EARLY PREGNANCY: Primary | ICD-10-CM

## 2023-09-22 DIAGNOSIS — Z3A.08 8 WEEKS GESTATION OF PREGNANCY: ICD-10-CM

## 2023-09-22 DIAGNOSIS — Z67.91 RH NEGATIVE STATE IN ANTEPARTUM PERIOD: ICD-10-CM

## 2023-09-22 PROCEDURE — 99213 OFFICE O/P EST LOW 20 MIN: CPT | Performed by: OBSTETRICS & GYNECOLOGY

## 2023-09-22 PROCEDURE — 76817 TRANSVAGINAL US OBSTETRIC: CPT | Performed by: OBSTETRICS & GYNECOLOGY

## 2023-09-22 NOTE — PROGRESS NOTES
[address]  [address] [facility], [address], [place], [address] [facility]    Assessment/Plan:  1. Bleeding in early pregnancy  A/P:   Ultrasound shows SLIUP with appropriate interval growth since 2023 ultrasound. Reassurance given to patient and . Reviewed possible adjacent vanishing twin sac vs small subchorionic hemorrhage, this is possibly cause of bleeding she is noting. Not unusual for spotting to continue as this resolves. Recom pelvic rest, no vigorous exercise. Okay to work. -     AMB US OB < 14 weeks single or first gestation level 1    2. Rh negative state in antepartum period  Comments:  after visit noted blood type A neg. See Telephone encounter from 2023 for plan. 3. 8 weeks gestation of pregnancy    Next appt: f/u 2023 scheduled    I have spent a total time of 20 minutes on 23 in caring for this patient including Diagnostic results, Prognosis, Instructions for management, Impressions, Counseling / Coordination of care, Documenting in the medical record and Obtaining or reviewing history  . Subjective:   Honore Habermann is a 28 y.o.  female. CC: still bleeding      HPI:   Emma Gan presents with her , Arianna Dean, for continued bleeding in early pregnancy. She has a sure LMP of 2023 and had a + UPT on 2023. She was seen in our office 2023 with c/o bleeding like a period for a few hours on 2023. At that visit she had u/s which confirmed IUP with yolk sac and fetal pole consistent with GA by LMP, with early [address] noted. Since then she has continued to have bleeding, mostly dark brown but occasionally red. It initially seemed to stop after ultrasound but has been intermittent and increased with red bleeding a couple days ago. Gyn History  Patient's last menstrual period was 2023 (exact date). Last pap smear: 2022    She  reports being sexually active and has had partner(s) who are male. She reports using the following methods of birth control/protection: Other and None. OB History      Past Medical History:  No date: Abnormal Pap smear of cervix      Comment:  just follow up  No date: Migraine  No date: UTI (urinary tract infection)  No date: Varicella      Comment:  chicken pox as child     No past surgical history on file. Social History     Tobacco Use   • Smoking status: Never   • Smokeless tobacco: Never   Vaping Use   • Vaping Use: Never used   Substance Use Topics   • Alcohol use: Not Currently   • Drug use: Never          Current Outpatient Medications:   •  Prenatal MV-Min-Fe Fum-FA-DHA (PRENATAL 1 PO), Take 1 tablet by mouth, Disp: , Rfl:     She has No Known Allergies. .    ROS: Review of Systems   Constitutional: Negative. Gastrointestinal: Negative. Genitourinary: Positive for vaginal bleeding. Negative for pelvic pain. Psychiatric/Behavioral: Negative. Objective:  /60 (BP Location: Left arm, Patient Position: Sitting, Cuff Size: Large)   Ht 5' 7.75" (1.721 m)   Wt 80.2 kg (176 lb 12.8 oz)   LMP 2023 (Exact Date)   BMI 27.08 kg/m²      Physical Exam  Constitutional:       Appearance: Normal appearance. Genitourinary:     General: Normal vulva. Vagina: Vaginal discharge (dark brown/old blood) present. Cervix: No cervical motion tenderness (cervix closed). Uterus: Normal. Enlarged (8 week). Not tender. Adnexa:         Right: No mass or tenderness. Left: No mass or tenderness. Rectum: No external hemorrhoid. Neurological:      Mental Status: She is alert. Psychiatric:         Mood and Affect: Mood normal.         Behavior: Behavior normal.           FIRST TRIMESTER OBSTETRIC ULTRASOUND  23    Andrea Ritter MD     INDICATION: Amenorrhea, viability    COMPARISON: 2023     TECHNIQUE:   Transvaginal imaging was performed to assess the gestation and myometrial/endometrial architecture. .    The study includes volumetric sweeps and traditional still imaging technique. FINDINGS:     A single intrauterine gestation is identified. Cardiac activity is detected at 176bpm.      YOLK SAC:  Present and normal in size and appearance. MEAN CROWN RUMP LENGTH:  20.4 mm = 8 weeks 4 days   AMNIOTIC FLUID/SAC SHAPE:  Within expected normal range. Possible vanishing twin sac vs subchorionic hemorrhage adjacent to GS closer to cervix - measuring 1.89cm. UTERUS:   No uterine abnormalities noted. IMPRESSION:    Patient's last menstrual period was 7/24/2023 (exact date). Gestational age based on LMP: 10w3d   Gestational age based on US: 10w3d     Will assign dating based on LMP which is c/w ultrasound.   Final Gestational age: 10w3d  Final Estimated Date of Delivery: 4/29/2024

## 2023-09-23 ENCOUNTER — APPOINTMENT (OUTPATIENT)
Dept: LAB | Facility: CLINIC | Age: 33
End: 2023-09-23
Payer: COMMERCIAL

## 2023-09-23 DIAGNOSIS — O20.9 BLEEDING IN EARLY PREGNANCY: ICD-10-CM

## 2023-09-23 LAB
ABO GROUP BLD: NORMAL
BLD GP AB SCN SERPL QL: NEGATIVE
RH BLD: NEGATIVE

## 2023-09-23 PROCEDURE — 86850 RBC ANTIBODY SCREEN: CPT

## 2023-09-23 PROCEDURE — 36415 COLL VENOUS BLD VENIPUNCTURE: CPT

## 2023-09-23 PROCEDURE — 86901 BLOOD TYPING SEROLOGIC RH(D): CPT

## 2023-09-23 PROCEDURE — 86900 BLOOD TYPING SEROLOGIC ABO: CPT

## 2023-09-25 ENCOUNTER — CLINICAL SUPPORT (OUTPATIENT)
Dept: OBGYN CLINIC | Facility: CLINIC | Age: 33
End: 2023-09-25
Payer: COMMERCIAL

## 2023-09-25 VITALS — SYSTOLIC BLOOD PRESSURE: 108 MMHG | DIASTOLIC BLOOD PRESSURE: 60 MMHG

## 2023-09-25 DIAGNOSIS — O26.899 RH NEGATIVE STATE IN ANTEPARTUM PERIOD: Primary | ICD-10-CM

## 2023-09-25 DIAGNOSIS — O20.9 BLEEDING IN EARLY PREGNANCY: ICD-10-CM

## 2023-09-25 DIAGNOSIS — Z67.91 RH NEGATIVE STATE IN ANTEPARTUM PERIOD: Primary | ICD-10-CM

## 2023-09-25 PROCEDURE — 96372 THER/PROPH/DIAG INJ SC/IM: CPT

## 2023-09-25 NOTE — PROGRESS NOTES
Patient presents for Rhogam injection per Dr. Townsend Olp recommendation due to bleeding in first trimester. 9/23/2023 bloodwork indicates A negative with negative antibody screen. Administered Rhogam left deltoid without difficulty. Lot# [id number]  Exp 02/23/2026  Paperwork faxed to [employer], original sent to  Blood bank via interoffice mail.

## 2023-09-25 NOTE — TELEPHONE ENCOUNTER
L/m on shade v/m to return call reference receiving rhogam.
Patient seen in office today for bleeding in early pregnancy and u/s confirmed SLIUP with FHT. Patient with first PNV scheduled next week 9/26/2023. After visit I realized patient Rh neg and should receive Rhogam.  I called Meera to review and asked her to have T&S and antibody screen done tomorrow Saturday at [facility] and we would give Rhogam at appt 9/26/2023. She agrees to do labs but realized she cannot make appt 9/26/2023 due to son with procedure scheduled. Discussed she can come Monday or Tuesday for Rhogam next week. Will have nurses call her to arrange nursing appt early next week for Rhogam and reschedule first PNV.     Order to [facility].
Return call form shade, she will be in today at 2:30
SOB/Open Heart Surg eval

## 2023-10-05 ENCOUNTER — INITIAL PRENATAL (OUTPATIENT)
Dept: OBGYN CLINIC | Facility: CLINIC | Age: 33
End: 2023-10-05
Payer: COMMERCIAL

## 2023-10-05 ENCOUNTER — PATIENT OUTREACH (OUTPATIENT)
Dept: OBGYN CLINIC | Facility: CLINIC | Age: 33
End: 2023-10-05

## 2023-10-05 VITALS
HEIGHT: 68 IN | BODY MASS INDEX: 25.91 KG/M2 | SYSTOLIC BLOOD PRESSURE: 98 MMHG | WEIGHT: 171 LBS | DIASTOLIC BLOOD PRESSURE: 58 MMHG

## 2023-10-05 DIAGNOSIS — Z3A.10 PREGNANCY WITH 10 COMPLETED WEEKS GESTATION: ICD-10-CM

## 2023-10-05 DIAGNOSIS — O09.521 AMA (ADVANCED MATERNAL AGE) MULTIGRAVIDA 35+, FIRST TRIMESTER: ICD-10-CM

## 2023-10-05 DIAGNOSIS — O26.899 RH NEGATIVE STATE IN ANTEPARTUM PERIOD: ICD-10-CM

## 2023-10-05 DIAGNOSIS — Z3A.10 10 WEEKS GESTATION OF PREGNANCY: Primary | ICD-10-CM

## 2023-10-05 DIAGNOSIS — Z67.91 RH NEGATIVE STATE IN ANTEPARTUM PERIOD: ICD-10-CM

## 2023-10-05 DIAGNOSIS — Z36.89 ENCOUNTER FOR OTHER SPECIFIED ANTENATAL SCREENING: Primary | ICD-10-CM

## 2023-10-05 DIAGNOSIS — Z86.69 HISTORY OF MIGRAINE HEADACHES: ICD-10-CM

## 2023-10-05 DIAGNOSIS — O20.9 BLEEDING IN EARLY PREGNANCY: ICD-10-CM

## 2023-10-05 LAB
SL AMB  POCT GLUCOSE, UA: NEGATIVE
SL AMB POCT URINE PROTEIN: NORMAL

## 2023-10-05 PROCEDURE — 76817 TRANSVAGINAL US OBSTETRIC: CPT | Performed by: OBSTETRICS & GYNECOLOGY

## 2023-10-05 PROCEDURE — 81002 URINALYSIS NONAUTO W/O SCOPE: CPT | Performed by: OBSTETRICS & GYNECOLOGY

## 2023-10-05 PROCEDURE — PNV: Performed by: OBSTETRICS & GYNECOLOGY

## 2023-10-05 PROCEDURE — OBC: Performed by: OBSTETRICS & GYNECOLOGY

## 2023-10-05 PROCEDURE — 87491 CHLMYD TRACH DNA AMP PROBE: CPT | Performed by: OBSTETRICS & GYNECOLOGY

## 2023-10-05 PROCEDURE — 87591 N.GONORRHOEAE DNA AMP PROB: CPT | Performed by: OBSTETRICS & GYNECOLOGY

## 2023-10-05 NOTE — PATIENT INSTRUCTIONS
Congratulations!! Please review our Pregnancy Essential Guide for informative education and here is a link to take a [facility] tour of our [address].      [facility] Pregnancy Essentials Guide  [facility] ([email or web address])       [facility] Women and [address] [facility]

## 2023-10-05 NOTE — PROGRESS NOTES
SW referred for initial prenatal assessment. Patient is Saurabh Cormier with an SHANNAN of 4/29/24. Patient presented with FOB ( Yanique Nichols), agreeable to meeting with SW.    Patient reports she and FOB are excited for pregnancy. They have told family/friends, who are all supportive. Patient and FOB live with their 16 month old son Yo Robbins in [place]. 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 They deny current financial concerns or need for WIC/SNAP information, parenting education, or baby supply resources. Patient denies current or h/o mental health, substance use, DV/IPV, CYS involvement, and legal concerns. Patient did see a therapist in the past but states it was due to situational stress. Denies need for therapist at this time. Has strong support from FOB, family, and friends. Enjoys working out, going out with FOB, and taking trips for fun/stress relief. No other needs identified at this time, SW closing referral. Please re-refer as needed.

## 2023-10-05 NOTE — PROGRESS NOTES
OB INTAKE INTERVIEW  Patient is 28 y. o.who presents for OB intake at 9wks1d   She is accompanied by: spouse  The father of her baby Zenaida Albrecht) IS involved in the pregnancy    , EAB1  Last Menstrual Period: 2023  Ultrasound: Measured 8 weeks 4 days on 2023  Estimated Date of Delivery: Confirmed by ultrasound. Signs/Symptoms of Pregnancy  Current pregnancy symptoms: breast tenderness. Denies nausea/vomiting  Constipation YES-colace, miralax recommended OTC  Headaches no  Cramping/spotting no  PICA cravings no    Diabetes-  There is no height or weight on file to calculate BMI. If patient has 1 or more, please order early 1 hour GTT  History of GDM no  BMI >30 no  History of PCOS or current metformin use no  History of LGA/macrosomic infant (4000g/9lbs) no    If patient has 2 or more, please order early 1 hour GTT  AMA YES-will be 35 10 days prior to SHANNAN  First degree relative with type 2 diabetes no  History of chronic HTN, hyperlipidemia, elevated A1C no  High risk race (, , ,  or ) no    Hypertension- if you answer yes, please order preeclampsia labs (cbc, comprehensive metabolic panel, urine protein creatinine ratio, uric acid)  History of of chronic HTN no  History of gestational HTN no  History of preeclampsia, eclampsia, or HELLP syndrome no  History of diabetes no  History of lupus, autoimmune disease, kidney disease, antiphospholipid syndrome, rheumatoid arthritis, sjogren's syndrome no    Thyroid- if yes order TSH with reflex T4 (Unless TSH value on file within last 4 weeks then do not need to order)  History of thyroid disease no    Bleeding Disorder or Hx of DVT-patient or first degree relative with history of. Order the following if not done previously.    (Factor V, antithrombin III, prothrombin gene mutation, protein C and S Ag, lupus anticoagulant, anticardiolipin, beta-2 glycoprotein)   no    OB/GYN-  History of abnormal pap smear YES-2022 Pap result negative/negative. Due for GC/CT culture today. History of HPV no  History of Herpes/HSV no  History of other STI (gonorrhea, chlamydia, trich) (or current partner with Hep B, Hep C, or HIV) no  History of prior  YES  History of prior  no  History of  delivery prior to 36 weeks 6 days no  History of blood transfusion no  Ok for blood transfusion YES    Substance screening-   History of tobacco use no  Currently using tobacco no  Currently using alcohol no  Presently using drugs no  Past drug use (if yes, order urine drug screening panel)  no  IV drug use (if yes, order urine drug screening panel) no  Partner drug use (if yes, order urine drug screening panel) no  Parent/Family drug use (do not order urine drug screening panel just for family hx) no    Depression Screening-  PHQ-9 Score: (0)    MRSA Screening-   Does the pt have a hx of MRSA? no  If yes- please follow MRSA protocol and obtain a nasal swab for MRSA culture at 12 week visit. Immunizations:  Influenza vaccine given this season- Flu vaccine available and recommended for  flu season  Discussed Tdap vaccine-advised will be recommended at 28 week OB visit  Discussed COVID Vaccine  YES, previously vaccinated    Genetic/M-  Do you or your partner have a history of any of the following in yourselves or first degree relatives? Cystic fibrosis no  Spinal muscular atrophy no  Hemoglobinopathy/Sickle Cell/Thalassemia no  Fragile X Intellectual Disability NO  Patient reports previously tested for genetic screening and negative. (not in chart)    If yes, discuss carrier screening and recommend consultation with [facility]/genetic counseling. If no, discuss option for carrier screening and/or genetic testing with Nuchal Ultrasound.  Patient interested   Appointment at [facility] made Referral provided    Interview education  [facility] Pregnancy Essentials Book reviewed and discussed YES-provided link to Pregnancy essentials guide in [facility]      Prenatal lab work scripts YES    Extra labs ordered:  NO    Details that I feel the provider should be aware of:  Bleeding in early pregnancy-U/S confirmed IUP with possible explanation for bleeding of vanishing twin or small subchorionic hemorrhage. Received Rhogam due to A negative blood type on 9/25/2023. H/O migraine due to seasonal/food allergies-relieved with tylenol(no previous neuro work up). H/O UTI-last episode prior to 2021. No h/o asthma,abdominal surgery or clotting disorder. Needs GC/CT today. The patient was oriented to our practice, reviewed delivering physicians and [address] in [place] for Delivery. All questions were answered.     Interviewed Luz Maria Colon LPN

## 2023-10-05 NOTE — PROGRESS NOTES
First OB Visit  [address] [place] [address], [address], [facility] [place], [address] [facility],[address]    Assessment/Plan:  Yara Valadez is a 28y.o. year old  at [address] [place] [address] who presents for initial prenatal visit. Final SHANNAN: 2024, by Last Menstrual Period      Supervision of normal pregnancy  - Aneuploidy screening discussed. Patient opts for sequential aneuploidy screening.  - Routine cervical cancer screening: Pap Up to date. - Routine STI Screening: GC/Chlamydia sent today. HIV/Hep B/Hep C/Syphilis ordered in prenatal panel.  - Patient Education: Patient was counseled regarding diet, exercise, weight gain, foods to avoid, vaccines in pregnancy, aneuploidy screening, travel precautions to include seat belt use and VTE risk reduction. She has been provided our pregnancy packet which includes how and when to contact providers, medication recommendations, dietary suggestions, breastfeeding information as well as websites for additional information, hospital and delivery concerns. Additional Pregnancy Problems:   1. 10 weeks gestation of pregnancy  -     AMB US OB < 14 weeks single or first gestation level 1    2. Bleeding in early pregnancy  Assessment & Plan:  Rhig - 23      3. Rh negative state in antepartum period  Assessment & Plan:  Rhig - 23      4. AMA (advanced maternal age) multigravida 33+, first trimester    5. History of migraine headaches  Assessment & Plan:  Patient states she has headaches associated with seasonal allergies          M referral given for early anatomy and aneuploidy screening, due 11-13 weeks. Next OB visit: 4 wks    Subjective:   CC:  Desires prenatal care  Michelle Wood is a 28 y.o.  female who presents for prenatal care. Patient's last menstrual period was 2023 (exact date). Which would make her 10 weeks and 3 days pregnant today. Pregnancy ROS: no leakage of fluid, pelvic pain, or vaginal bleeding. no nausea/vomiting.     OB History  Para Term  AB Living   3 1 1   1 1   SAB IAB Ectopic Multiple Live Births     1   0 1      # Outcome Date GA Lbr Jerrod/2nd Weight Sex Delivery Anes PTL Lv   3 Current            2 Term 22 40w1d / 01:03 3730 g (8 lb 3.6 oz) M Vag-Spont EPI N BETH      Birth Comments: seen by Dr Rd Fay for meconium stained amniotic fluid   1 IAB                The following portions of the patient's history were reviewed and updated as appropriate: She  has a past medical history of Abnormal Pap smear of cervix, Migraine, UTI (urinary tract infection), and Varicella. She  has no past surgical history on file. Her family history includes Asthma in her paternal grandfather and paternal grandmother; Dementia in her maternal grandfather; Diabetes in her paternal grandfather and paternal grandmother; Diverticulitis in her father; Heart disease in her paternal grandfather and paternal grandmother; Hyperlipidemia in her paternal grandfather and paternal grandmother; Hypertension in her mother, paternal grandfather, and paternal grandmother; No Known Problems in her brother; Pancreatic cancer in her maternal grandmother. She  reports that she has never smoked. She has never used smokeless tobacco. She reports that she does not currently use alcohol. She reports that she does not use drugs. Current Outpatient Medications   Medication Sig Dispense Refill   • Prenatal MV-Min-Fe Fum-FA-DHA (PRENATAL 1 PO) Take 1 tablet by mouth       No current facility-administered medications for this visit. She has No Known Allergies. .      Objective:  LMP 2023 (Exact Date)   Pregravid Weight/BMI: Pregravid weight not on file (BMI Could not be calculated)  Current Weight:     Total Weight Gain: Not found.    Pre- Vitals    Flowsheet Row Most Recent Value   Prenatal Assessment    Fetal Heart Rate 150-160   Fundal Height (cm) 10 cm   Movement Absent   Prenatal Vitals    Urine Albumin/Glucose Dilation/Effacement/Station    Cervical Dilation 0   Cervical Effacement 0   Vaginal Drainage    Draining Fluid No   Edema          General: Well appearing, no distress  Breasts: Normal bilaterally, nontender without masses, asymmetry, or nipple discharge. Abdomen: Soft, gravid, nontender  : Urethra normal. Normal labia majora and minora. Vagina normal.  No vaginal bleeding. No vaginal discharge. Cervix closed. Uterus non-tender. Extremities: Warm and well perfused. Non tender. No edema.     Ultrasound: ultrasound confirmed SLIUP, see report for details

## 2023-10-06 LAB
C TRACH DNA SPEC QL NAA+PROBE: NEGATIVE
N GONORRHOEA DNA SPEC QL NAA+PROBE: NEGATIVE

## 2023-10-16 ENCOUNTER — APPOINTMENT (OUTPATIENT)
Dept: LAB | Facility: HOSPITAL | Age: 33
End: 2023-10-16
Payer: COMMERCIAL

## 2023-10-16 DIAGNOSIS — Z36.89 ENCOUNTER FOR OTHER SPECIFIED ANTENATAL SCREENING: ICD-10-CM

## 2023-10-16 LAB
ABO GROUP BLD: NORMAL
BACTERIA UR QL AUTO: ABNORMAL /HPF
BASOPHILS # BLD AUTO: 0.03 THOUSANDS/ÂΜL (ref 0–0.1)
BASOPHILS NFR BLD AUTO: 0 % (ref 0–1)
BILIRUB UR QL STRIP: NEGATIVE
BLD GP AB SCN SERPL QL: POSITIVE
CAOX CRY URNS QL MICRO: ABNORMAL /HPF
CLARITY UR: CLEAR
COLOR UR: ABNORMAL
EOSINOPHIL # BLD AUTO: 0.08 THOUSAND/ÂΜL (ref 0–0.61)
EOSINOPHIL NFR BLD AUTO: 1 % (ref 0–6)
ERYTHROCYTE [DISTWIDTH] IN BLOOD BY AUTOMATED COUNT: 11.6 % (ref 11.6–15.1)
GLUCOSE UR STRIP-MCNC: NEGATIVE MG/DL
HBV SURFACE AB SER-ACNC: >500 MIU/ML
HBV SURFACE AG SER QL: NORMAL
HCT VFR BLD AUTO: 40.3 % (ref 34.8–46.1)
HCV AB SER QL: NORMAL
HGB BLD-MCNC: 13.7 G/DL (ref 11.5–15.4)
HGB UR QL STRIP.AUTO: NEGATIVE
HIV 1+2 AB+HIV1 P24 AG SERPL QL IA: NORMAL
HIV 2 AB SERPL QL IA: NORMAL
HIV1 AB SERPL QL IA: NORMAL
HIV1 P24 AG SERPL QL IA: NORMAL
IMM GRANULOCYTES # BLD AUTO: 0.02 THOUSAND/UL (ref 0–0.2)
IMM GRANULOCYTES NFR BLD AUTO: 0 % (ref 0–2)
KETONES UR STRIP-MCNC: ABNORMAL MG/DL
LEUKOCYTE ESTERASE UR QL STRIP: ABNORMAL
LYMPHOCYTES # BLD AUTO: 2.32 THOUSANDS/ÂΜL (ref 0.6–4.47)
LYMPHOCYTES NFR BLD AUTO: 29 % (ref 14–44)
MCH RBC QN AUTO: 30.2 PG (ref 26.8–34.3)
MCHC RBC AUTO-ENTMCNC: 34 G/DL (ref 31.4–37.4)
MCV RBC AUTO: 89 FL (ref 82–98)
MONOCYTES # BLD AUTO: 0.54 THOUSAND/ÂΜL (ref 0.17–1.22)
MONOCYTES NFR BLD AUTO: 7 % (ref 4–12)
MUCOUS THREADS UR QL AUTO: ABNORMAL
NEUTROPHILS # BLD AUTO: 5.1 THOUSANDS/ÂΜL (ref 1.85–7.62)
NEUTS SEG NFR BLD AUTO: 63 % (ref 43–75)
NITRITE UR QL STRIP: NEGATIVE
NON-SQ EPI CELLS URNS QL MICRO: ABNORMAL /HPF
NRBC BLD AUTO-RTO: 0 /100 WBCS
PH UR STRIP.AUTO: 5.5 [PH]
PLATELET # BLD AUTO: 259 THOUSANDS/UL (ref 149–390)
PMV BLD AUTO: 9.7 FL (ref 8.9–12.7)
PROT UR STRIP-MCNC: ABNORMAL MG/DL
RBC # BLD AUTO: 4.54 MILLION/UL (ref 3.81–5.12)
RBC #/AREA URNS AUTO: ABNORMAL /HPF
RH BLD: NEGATIVE
RUBV IGG SERPL IA-ACNC: 47.9 IU/ML
SP GR UR STRIP.AUTO: >=1.03 (ref 1–1.03)
SPECIMEN EXPIRATION DATE: NORMAL
TREPONEMA PALLIDUM IGG+IGM AB [PRESENCE] IN SERUM OR PLASMA BY IMMUNOASSAY: NORMAL
UROBILINOGEN UR STRIP-ACNC: 2 MG/DL
WBC # BLD AUTO: 8.09 THOUSAND/UL (ref 4.31–10.16)
WBC #/AREA URNS AUTO: ABNORMAL /HPF

## 2023-10-16 PROCEDURE — 85025 COMPLETE CBC W/AUTO DIFF WBC: CPT

## 2023-10-16 PROCEDURE — 86850 RBC ANTIBODY SCREEN: CPT

## 2023-10-16 PROCEDURE — 86780 TREPONEMA PALLIDUM: CPT

## 2023-10-16 PROCEDURE — 87340 HEPATITIS B SURFACE AG IA: CPT

## 2023-10-16 PROCEDURE — 36415 COLL VENOUS BLD VENIPUNCTURE: CPT

## 2023-10-16 PROCEDURE — 86900 BLOOD TYPING SEROLOGIC ABO: CPT

## 2023-10-16 PROCEDURE — 86870 RBC ANTIBODY IDENTIFICATION: CPT

## 2023-10-16 PROCEDURE — 86901 BLOOD TYPING SEROLOGIC RH(D): CPT

## 2023-10-16 PROCEDURE — 87086 URINE CULTURE/COLONY COUNT: CPT

## 2023-10-16 PROCEDURE — 86803 HEPATITIS C AB TEST: CPT

## 2023-10-16 PROCEDURE — 86706 HEP B SURFACE ANTIBODY: CPT

## 2023-10-16 PROCEDURE — 87389 HIV-1 AG W/HIV-1&-2 AB AG IA: CPT

## 2023-10-16 PROCEDURE — 86762 RUBELLA ANTIBODY: CPT

## 2023-10-17 LAB — BACTERIA UR CULT: NORMAL

## 2023-10-19 ENCOUNTER — ROUTINE PRENATAL (OUTPATIENT)
Dept: PERINATAL CARE | Facility: OTHER | Age: 33
End: 2023-10-19
Payer: COMMERCIAL

## 2023-10-19 VITALS
HEART RATE: 90 BPM | SYSTOLIC BLOOD PRESSURE: 98 MMHG | DIASTOLIC BLOOD PRESSURE: 60 MMHG | HEIGHT: 68 IN | WEIGHT: 171.6 LBS | BODY MASS INDEX: 26.01 KG/M2

## 2023-10-19 DIAGNOSIS — Z36.82 ENCOUNTER FOR NUCHAL TRANSLUCENCY TESTING: ICD-10-CM

## 2023-10-19 DIAGNOSIS — Z3A.12 12 WEEKS GESTATION OF PREGNANCY: ICD-10-CM

## 2023-10-19 DIAGNOSIS — Z36.89 ENCOUNTER FOR OTHER SPECIFIED ANTENATAL SCREENING: ICD-10-CM

## 2023-10-19 DIAGNOSIS — O09.521 AMA (ADVANCED MATERNAL AGE) MULTIGRAVIDA 35+, FIRST TRIMESTER: Primary | ICD-10-CM

## 2023-10-19 PROCEDURE — 76813 OB US NUCHAL MEAS 1 GEST: CPT | Performed by: OBSTETRICS & GYNECOLOGY

## 2023-10-19 PROCEDURE — 76801 OB US < 14 WKS SINGLE FETUS: CPT | Performed by: OBSTETRICS & GYNECOLOGY

## 2023-10-19 NOTE — PROGRESS NOTES
Patient chose to have [facility] Non-invasive Prenatal Screen with fetal sex. Patient choose billed through insurance. Patient assistance program (PAP) application provided to patient yes. PAP sent with specimen No.     Patient given brochure and is aware [facility] will contact their insurance and coordinate coverage. Patient made aware she will receive a text message and e-mail from [employer]. Patient informed text/phone call message will come from area code  "415". Provided [employer] # [phone] and web site at [employer].   "[place] your test online" card with barcode and test tube ID provided to patient. Reviewed [facility] web site states 5-7 business days for results via their portal.   [place] message will be sent to patient when M receives results /provider reviews. 2 vials of blood drawn from  right arm by Alfred Chang   Patient tolerated blood draw without difficulty. Specimens labeled with patient identifiers (name, date of birth, specimen collection date), order and specimen were verified with patient, packed and sent via [address]. [employer]  tracking #  [id number]  Copy of lab order scanned to Epic media. Maternal Fetal Medicine will have results in approximately 7-10 business days and will call patient or notify via [address] [place] [address]. Patient aware viewing lab result online will reveal fetal sex if ordered. Patient verbalized understanding of all instructions and no questions at this time.

## 2023-10-19 NOTE — PROGRESS NOTES
The patient was seen today for an ultrasound. Please see ultrasound report (located under Ob Procedures) for additional details. Thank you very much for allowing us to participate in the care of this very nice patient. Should you have any questions, please do not hesitate to contact me. Jah Helm MD [id number] [facility]  Attending Physician, [address] [place] [address]

## 2023-10-20 LAB — BLOOD GROUP ANTIBODIES SERPL: NORMAL

## 2023-11-08 ENCOUNTER — ROUTINE PRENATAL (OUTPATIENT)
Dept: OBGYN CLINIC | Facility: CLINIC | Age: 33
End: 2023-11-08
Payer: COMMERCIAL

## 2023-11-08 ENCOUNTER — APPOINTMENT (OUTPATIENT)
Dept: LAB | Facility: CLINIC | Age: 33
End: 2023-11-08
Payer: COMMERCIAL

## 2023-11-08 VITALS
WEIGHT: 178.2 LBS | SYSTOLIC BLOOD PRESSURE: 102 MMHG | BODY MASS INDEX: 27.01 KG/M2 | DIASTOLIC BLOOD PRESSURE: 64 MMHG | HEIGHT: 68 IN

## 2023-11-08 DIAGNOSIS — Z3A.15 15 WEEKS GESTATION OF PREGNANCY: ICD-10-CM

## 2023-11-08 DIAGNOSIS — Z86.69 HISTORY OF MIGRAINE HEADACHES: ICD-10-CM

## 2023-11-08 DIAGNOSIS — O09.521 AMA (ADVANCED MATERNAL AGE) MULTIGRAVIDA 35+, FIRST TRIMESTER: Primary | ICD-10-CM

## 2023-11-08 DIAGNOSIS — Z36.1 NEED FOR MATERNAL SERUM ALPHA-PROTEIN (MSAFP) SCREENING: ICD-10-CM

## 2023-11-08 LAB
SL AMB  POCT GLUCOSE, UA: NORMAL
SL AMB POCT URINE PROTEIN: NORMAL

## 2023-11-08 PROCEDURE — PNV: Performed by: PHYSICIAN ASSISTANT

## 2023-11-08 PROCEDURE — 81002 URINALYSIS NONAUTO W/O SCOPE: CPT | Performed by: PHYSICIAN ASSISTANT

## 2023-11-08 PROCEDURE — 36415 COLL VENOUS BLD VENIPUNCTURE: CPT

## 2023-11-08 PROCEDURE — 82105 ALPHA-FETOPROTEIN SERUM: CPT

## 2023-11-08 NOTE — ASSESSMENT & PLAN NOTE
Reviewed AFP to evaluate for ONTD. Script given. Aware to have done between 15-18 weeks. Has Level II ultrasound scheduled. Return to office for ob check in 4 weeks.

## 2023-11-08 NOTE — PROGRESS NOTES
Routine Prenatal Visit  [address]  [address] [place] [address], [address]    Assessment/Plan:  Buck Gaviria is a 28y.o. year old  at 15w2d who presents for routine prenatal visit. 1. AMA (advanced maternal age) multigravida 33+, first trimester    2. 15 weeks gestation of pregnancy  Assessment & Plan:  Reviewed AFP to evaluate for ONTD. Script given. Aware to have done between 15-18 weeks. Has Level II ultrasound scheduled. Return to office for ob check in 4 weeks. Orders:  -     POCT urine dip    3. History of migraine headaches    4. Need for maternal serum alpha-protein (MSAFP) screening  -     Alpha fetoprotein, maternal; Future        Next OB Visit 4 weeks. Subjective:     CC: Prenatal care    Roseline Gimenez is a 28 y.o. Donnarichie Daniels female who presents for routine prenatal care at 15w2d. Pregnancy ROS: reports more indigestion and  food aversions. Some nasal congestion. No Fm, N/V, HA, cramping, VB, LOF, edema, domestic violence, or smoking. Tolerating PNV.       The following portions of the patient's history were reviewed and updated as appropriate: allergies, current medications, past family history, past medical history, obstetric history, gynecologic history, past social history, past surgical history and problem list.      Objective:  /64 (BP Location: Left arm, Patient Position: Sitting, Cuff Size: Standard)   Ht 5' 7.7" (1.72 m)   Wt 80.8 kg (178 lb 3.2 oz)   LMP 2023 (Exact Date)   BMI 27.34 kg/m²   Pregravid Weight/BMI: 77.6 kg (171 lb) (BMI 26.22)  Current Weight: 80.8 kg (178 lb 3.2 oz)   Total Weight Gain: 3.266 kg (7 lb 3.2 oz)   Pre-Matt Vitals      Flowsheet Row Most Recent Value   Prenatal Assessment    Fetal Heart Rate 155   Fundal Height (cm) 15 cm   Movement Absent   Prenatal Vitals    Blood Pressure 102/64   Weight - Scale 80.8 kg (178 lb 3.2 oz)   Urine Albumin/Glucose    Dilation/Effacement/Station    Vaginal Drainage    Edema    LLE Edema None   RLE Edema None   Facial Edema None             General: Well appearing, no distress  Abdomen: Soft, gravid, nontender  Fundal Height: Appropriate for gestational age. Extremities: Warm and well perfused. Non tender.

## 2023-11-10 ENCOUNTER — TELEPHONE (OUTPATIENT)
Dept: OBGYN CLINIC | Facility: CLINIC | Age: 33
End: 2023-11-10

## 2023-11-10 LAB
2ND TRIMESTER 4 SCREEN SERPL-IMP: NORMAL
AFP ADJ MOM SERPL: 0.59
AFP INTERP AMN-IMP: NORMAL
AFP INTERP SERPL-IMP: NORMAL
AFP INTERP SERPL-IMP: NORMAL
AFP SERPL-MCNC: 23.5 NG/ML
AGE AT DELIVERY: 34.9 YR
GA METHOD: NORMAL
GA: 18.1 WEEKS
IDDM PATIENT QL: NO
MULTIPLE PREGNANCY: NO
NEURAL TUBE DEFECT RISK FETUS: NORMAL %

## 2023-11-10 NOTE — TELEPHONE ENCOUNTER
Patient's dentist is requesting a clearance letter for patient to get a cavity filled on 11/21. Patient is unsure exactly why / what for. If appropriate, please create letter and call patient when it is ready to  or fax. If not appropriate, please call patient. Thank you!

## 2023-11-13 NOTE — TELEPHONE ENCOUNTER
Message left to patient to provide Name, Location, Phone/Fax number of Dentistry. Letter will be prepared & forward.

## 2023-12-14 ENCOUNTER — ROUTINE PRENATAL (OUTPATIENT)
Dept: OBGYN CLINIC | Facility: CLINIC | Age: 33
End: 2023-12-14

## 2023-12-14 VITALS
SYSTOLIC BLOOD PRESSURE: 110 MMHG | HEIGHT: 67 IN | BODY MASS INDEX: 27.94 KG/M2 | DIASTOLIC BLOOD PRESSURE: 62 MMHG | WEIGHT: 178 LBS

## 2023-12-14 DIAGNOSIS — O09.522 MULTIGRAVIDA OF ADVANCED MATERNAL AGE IN SECOND TRIMESTER: Primary | ICD-10-CM

## 2023-12-14 DIAGNOSIS — Z3A.20 20 WEEKS GESTATION OF PREGNANCY: ICD-10-CM

## 2023-12-14 DIAGNOSIS — Z86.69 HISTORY OF MIGRAINE HEADACHES: ICD-10-CM

## 2023-12-14 LAB
SL AMB  POCT GLUCOSE, UA: NORMAL
SL AMB POCT URINE PROTEIN: NORMAL

## 2023-12-14 NOTE — ASSESSMENT & PLAN NOTE
Has Level II ultrasound scheduled tomorrow. Continue routine prenatal care. Return to office for ob check in 4 weeks.

## 2023-12-14 NOTE — PATIENT INSTRUCTIONS
Thank you for choosing us for your  care today.  If you have any questions about your ultrasound or care, please do not hesitate to contact us or your primary obstetrician.        Some general instructions for your pregnancy are:    Exercise: Aim for 22 minutes per day (150 minutes per week) of regular exercise.  Walking is great!  Nutrition: Choose healthy sources of calcium, iron, and protein.  Learn about Preeclampsia: preeclampsia is a common, serious high blood pressure complication in pregnancy.  A blood pressure of 140mmHg (systolic or top number) or 90mmHg (diastolic or bottom number) is not normal and needs evaluation by your doctor.  Aspirin is sometimes prescribed in early pregnancy to prevent preeclampsia in women with risk factors - ask your obstetrician if you should be on this medication.  For more resources, visit:  https://www.highriskpregnancyinfo.org/preeclampsia  Consider the RSV vaccine (Abrysvo) between 32 weeks 0 days and 36 weeks 6 days to protect your baby.  If you smoke, try to reduce how many cigarettes you smoke or try to quit completely.  Do not vape.    Other warning signs to watch out for in pregnancy or postpartum: chest pain, obstructed breathing or shortness of breath, seizures, thoughts of hurting yourself or your baby, bleeding, a painful or swollen leg, fever, or headache (see AWHONN POST-BIRTH Warning Signs campaign).  If these happen call 911.  Itching is also not normal in pregnancy and if you experience this, especially over your hands and feet, potentially worse at night, notify your doctors.

## 2023-12-14 NOTE — PROGRESS NOTES
Routine Prenatal Visit  [address]  [address], [facility] [place], [address]  [facility]     Assessment/Plan:  Krystyna Aquino is a 28y.o. year old  at 20w3d who presents for routine prenatal visit. 3. Multigravida of advanced maternal age in second trimester    2. 20 weeks gestation of pregnancy  Assessment & Plan:  Has Level II ultrasound scheduled tomorrow. Continue routine prenatal care. Return to office for ob check in 4 weeks. Orders:  -     POCT urine dip    3. History of migraine headaches        Next OB Visit 4 weeks. Subjective:     CC: Prenatal care    Elise Lainez is a 28 y.o.  female who presents for routine prenatal care at 20w3d. Pregnancy ROS: Good Fm, No N/V, HA, cramping, VB, LOF, edema, domestic violence, or smoking. Tolerating PNV. The following portions of the patient's history were reviewed and updated as appropriate: allergies, current medications, past family history, past medical history, obstetric history, gynecologic history, past social history, past surgical history and problem list.      Objective:  /62 (BP Location: Left arm, Patient Position: Sitting, Cuff Size: Standard)   Ht 5' 7" (1.702 m)   Wt 80.7 kg (178 lb)   LMP 2023 (Exact Date)   BMI 27.88 kg/m²   Pregravid Weight/BMI: 77.6 kg (171 lb) (BMI 26.78)  Current Weight: 80.7 kg (178 lb)   Total Weight Gain: 3.175 kg (7 lb)   Pre-Matt Vitals      Flowsheet Row Most Recent Value   Prenatal Assessment    Fetal Heart Rate 145   Fundal Height (cm) 20 cm   Movement Present   Prenatal Vitals    Blood Pressure 110/62   Weight - Scale 80.7 kg (178 lb)   Urine Albumin/Glucose    Dilation/Effacement/Station    Vaginal Drainage    Edema    LLE Edema None   RLE Edema None   Facial Edema None             General: Well appearing, no distress  Abdomen: Soft, gravid, nontender  Fundal Height: Appropriate for gestational age. Extremities: Warm and well perfused. Non tender.

## 2023-12-15 ENCOUNTER — ROUTINE PRENATAL (OUTPATIENT)
Dept: PERINATAL CARE | Facility: OTHER | Age: 33
End: 2023-12-15
Payer: COMMERCIAL

## 2023-12-15 VITALS
WEIGHT: 179.4 LBS | HEART RATE: 66 BPM | BODY MASS INDEX: 28.16 KG/M2 | DIASTOLIC BLOOD PRESSURE: 58 MMHG | SYSTOLIC BLOOD PRESSURE: 112 MMHG | HEIGHT: 67 IN

## 2023-12-15 DIAGNOSIS — Z36.86 ENCOUNTER FOR ANTENATAL SCREENING FOR CERVICAL LENGTH: ICD-10-CM

## 2023-12-15 DIAGNOSIS — O09.522 MULTIGRAVIDA OF ADVANCED MATERNAL AGE IN SECOND TRIMESTER: Primary | ICD-10-CM

## 2023-12-15 DIAGNOSIS — Z36.3 ENCOUNTER FOR ANTENATAL SCREENING FOR MALFORMATIONS: ICD-10-CM

## 2023-12-15 DIAGNOSIS — Z3A.20 20 WEEKS GESTATION OF PREGNANCY: ICD-10-CM

## 2023-12-15 PROCEDURE — 76817 TRANSVAGINAL US OBSTETRIC: CPT | Performed by: OBSTETRICS & GYNECOLOGY

## 2023-12-15 PROCEDURE — 76811 OB US DETAILED SNGL FETUS: CPT | Performed by: OBSTETRICS & GYNECOLOGY

## 2023-12-15 NOTE — PROGRESS NOTES
Ultrasound Probe Disinfection    A transvaginal ultrasound was performed.   Prior to use, disinfection was performed with High Level [employer]infection Process ([employer]).  Probe serial number [id number] was used.      Loretta Montes  12/15/23  2:27 PM

## 2023-12-18 NOTE — PROGRESS NOTES
"[facility]: Ms. Harvey was seen today for anatomic survey and cervical length screening ultrasound.  See ultrasound report under \"OB Procedures\" tab.   Please don't hesitate to contact our office with any concerns or questions.  -Liliana Rosario MD      "

## 2024-01-09 ENCOUNTER — ROUTINE PRENATAL (OUTPATIENT)
Dept: OBGYN CLINIC | Facility: CLINIC | Age: 34
End: 2024-01-09
Payer: COMMERCIAL

## 2024-01-09 VITALS — BODY MASS INDEX: 28.76 KG/M2 | DIASTOLIC BLOOD PRESSURE: 58 MMHG | SYSTOLIC BLOOD PRESSURE: 100 MMHG | WEIGHT: 183.6 LBS

## 2024-01-09 DIAGNOSIS — Z3A.24 24 WEEKS GESTATION OF PREGNANCY: Primary | ICD-10-CM

## 2024-01-09 DIAGNOSIS — Z36.89 ENCOUNTER FOR OTHER SPECIFIED ANTENATAL SCREENING: ICD-10-CM

## 2024-01-09 LAB
SL AMB  POCT GLUCOSE, UA: NEGATIVE
SL AMB POCT URINE PROTEIN: NEGATIVE

## 2024-01-09 PROCEDURE — PNV: Performed by: OBSTETRICS & GYNECOLOGY

## 2024-01-09 PROCEDURE — 81002 URINALYSIS NONAUTO W/O SCOPE: CPT | Performed by: OBSTETRICS & GYNECOLOGY

## 2024-01-09 NOTE — PROGRESS NOTES
Routine Prenatal Visit  [facility] - [place] [address], [address], [place] [address]    Assessment/Plan:  Meera is a 33 y.o. year old  at 24w1d who presents for routine prenatal visit.     1. 24 weeks gestation of pregnancy  -     POCT urine dip    2. Encounter for other specified  screening  -     Glucose, 1H PG; Future  -     ABO/Rh; Future  -     Antibody screen; Future  -     CBC; Future  -     RPR-Syphilis Screening (Total Syphilis IGG/IGM); Future      + fm  no  UTCX no leaking of fluid  Dw pt  28 week labs      Subjective:     CC: Prenatal care    Meera Harvey is a 33 y.o.  female who presents for routine prenatal care at 24w1d.  Pregnancy ROS: no  leakage of fluid, pelvic pain, or vaginal bleeding.  +  fetal movement.    The following portions of the patient's history were reviewed and updated as appropriate: allergies, current medications, past family history, past medical history, obstetric history, gynecologic history, past social history, past surgical history and problem list.      Objective:  /58   Wt 83.3 kg (183 lb 9.6 oz)   LMP 2023 (Exact Date)   BMI 28.76 kg/m²   Pregravid Weight/BMI: 77.6 kg (171 lb) (BMI 26.78)  Current Weight: 83.3 kg (183 lb 9.6 oz)   Total Weight Gain: 5.715 kg (12 lb 9.6 oz)   Pre-Matt Vitals    Flowsheet Row Most Recent Value   Prenatal Assessment    Fetal Heart Rate 136   Fundal Height (cm) 24 cm   Movement Present   Prenatal Vitals    Blood Pressure 100/58   Weight - Scale 83.3 kg (183 lb 9.6 oz)   Urine Albumin/Glucose    Dilation/Effacement/Station    Vaginal Drainage    Draining Fluid No   Edema    LLE Edema None   RLE Edema None   Facial Edema None           General: Well appearing, no distress  Respiratory: Unlabored breathing  Cardiovascular: Regular rate.  Abdomen: Soft, gravid, nontender  Fundal Height: Appropriate for gestational age.  Extremities: Warm and well perfused.  Non tender.

## 2024-01-09 NOTE — PATIENT INSTRUCTIONS
NUTRITION IN PREGNANCY  Good Nutrition is a VERY important part of having a healthy pregnancy and healthy baby.  You should follow a healthy diet which include the following:   * Vegetables (which are dark green and leafy): at least 2 servings each day   * Protein (meat, eggs, beans, nuts, peanut butter): 3-4 servings each day   * Breads/whole grains (bread, pasta, rice, tortillas, potatoes): 3 servings each day   * Dairy (milk, yogurt, cheese): 3-4 servings each day   * Water: 6-8 glasses per day   * Calories: approximately 2000 to 2200 calories per day     WEIGHT GAIN   Recommended weight gain for you during your pregnancy is based on your body mass index (BMI) at the time that you became pregnant.   Pre-pregnant BMI Recommended weight gain   Underweight (BMI less than 18.5) 28 to 40 pounds   Normal weight (BMI 18.5-24.9) 25 to 35 pounds   Overweight (BMI 25-29.9) 15 to 25 pounds   Obese (BMI 30 or greater) 11 to 20 pounds     FOOD SAFETY   It is VERY important to eat only safely-prepared foods during pregnancy as you and your baby have a higher risk than usual for being affected by foodborne illnesses.  Follow these steps to ensure that you and your baby are safe from foodborne illnesses while you are pregnant:   wash hands thoroughly with warm water and soap before and after handling any foods   wash cutting boards, dishes, utensils, and countertops with hot water and soap before and after preparing any foods   rinse raw fruits and vegetables thoroughly under running water before eating   keep raw meat and seafood separate from other foods and use different cutting boards/utensils to handle raw meat than for other foods   put cooked food on a freshly clean plate   cook all of your foods thoroughly   discard foods that have been left out for more than 2 hours   refrigerate or freeze any foods than can spoil     There are three particular foodborne risks that you should be aware of and avoid as they can cause  serious harm to your unborn child.     * Listeria (a harmful bacteria)   don’t eat hot dogs or deli meats (unless they’re reheated until steaming hot)   don’t eat soft cheeses (such as Feta, Brie, Camembert) unless they are specifically labeled as being “made with pasteurized milk”   don’t drink raw (unpasteurized) milk   don’t eat refrigerated pates or meat spreads   don’t eat refrigerated smoked seafood unless it’s in a cooked dish like a casserole     * Mercury (a metal which is found in certain fish in high levels)   don’t eat shark, tilefish, pro mackerel, or swordfish   don’t eat more than 12 ounces per week of shrimp, salmon, pollock, or catfish   when eating tuna fish, you can have up to 6 ounces per week of canned albacore tuna OR up to 12 ounces of canned light tuna     * Toxoplasma (a harmful parasite)   cook meat thoroughly before eating   wear gloves when gardening or handling sand from a child’s sandbox   if you ha tve a cat, have someone else change the litter box while you are pregnant.    if you HAVE to clean it yourself, be sure to wash your hands thoroughly afterwards with warm water and soap.   don’t get a NEW cat while you are pregnant

## 2024-02-05 ENCOUNTER — APPOINTMENT (OUTPATIENT)
Dept: LAB | Facility: HOSPITAL | Age: 34
End: 2024-02-05
Payer: COMMERCIAL

## 2024-02-05 ENCOUNTER — ROUTINE PRENATAL (OUTPATIENT)
Dept: OBGYN CLINIC | Facility: CLINIC | Age: 34
End: 2024-02-05
Payer: COMMERCIAL

## 2024-02-05 VITALS
HEIGHT: 67 IN | DIASTOLIC BLOOD PRESSURE: 64 MMHG | BODY MASS INDEX: 28.88 KG/M2 | WEIGHT: 184 LBS | SYSTOLIC BLOOD PRESSURE: 114 MMHG

## 2024-02-05 DIAGNOSIS — Z23 NEED FOR TDAP VACCINATION: ICD-10-CM

## 2024-02-05 DIAGNOSIS — O09.523 MULTIGRAVIDA OF ADVANCED MATERNAL AGE IN THIRD TRIMESTER: ICD-10-CM

## 2024-02-05 DIAGNOSIS — Z86.69 HISTORY OF MIGRAINE HEADACHES: ICD-10-CM

## 2024-02-05 DIAGNOSIS — Z3A.28 28 WEEKS GESTATION OF PREGNANCY: Primary | ICD-10-CM

## 2024-02-05 DIAGNOSIS — Z36.89 ENCOUNTER FOR OTHER SPECIFIED ANTENATAL SCREENING: ICD-10-CM

## 2024-02-05 LAB
ABO GROUP BLD: NORMAL
BLD GP AB SCN SERPL QL: NEGATIVE
ERYTHROCYTE [DISTWIDTH] IN BLOOD BY AUTOMATED COUNT: 12.1 % (ref 11.6–15.1)
GLUCOSE 1H P 50 G GLC PO SERPL-MCNC: 67 MG/DL (ref 40–134)
HCT VFR BLD AUTO: 38.3 % (ref 34.8–46.1)
HGB BLD-MCNC: 12.4 G/DL (ref 11.5–15.4)
MCH RBC QN AUTO: 29.5 PG (ref 26.8–34.3)
MCHC RBC AUTO-ENTMCNC: 32.4 G/DL (ref 31.4–37.4)
MCV RBC AUTO: 91 FL (ref 82–98)
PLATELET # BLD AUTO: 238 THOUSANDS/UL (ref 149–390)
PMV BLD AUTO: 8.9 FL (ref 8.9–12.7)
RBC # BLD AUTO: 4.2 MILLION/UL (ref 3.81–5.12)
RH BLD: NEGATIVE
SL AMB  POCT GLUCOSE, UA: NORMAL
SL AMB POCT URINE PROTEIN: NORMAL
TREPONEMA PALLIDUM IGG+IGM AB [PRESENCE] IN SERUM OR PLASMA BY IMMUNOASSAY: NORMAL
WBC # BLD AUTO: 8.53 THOUSAND/UL (ref 4.31–10.16)

## 2024-02-05 PROCEDURE — 82950 GLUCOSE TEST: CPT

## 2024-02-05 PROCEDURE — 86780 TREPONEMA PALLIDUM: CPT

## 2024-02-05 PROCEDURE — 36415 COLL VENOUS BLD VENIPUNCTURE: CPT

## 2024-02-05 PROCEDURE — 86850 RBC ANTIBODY SCREEN: CPT

## 2024-02-05 PROCEDURE — 90715 TDAP VACCINE 7 YRS/> IM: CPT | Performed by: OBSTETRICS & GYNECOLOGY

## 2024-02-05 PROCEDURE — 81002 URINALYSIS NONAUTO W/O SCOPE: CPT | Performed by: OBSTETRICS & GYNECOLOGY

## 2024-02-05 PROCEDURE — PNV: Performed by: OBSTETRICS & GYNECOLOGY

## 2024-02-05 PROCEDURE — 90471 IMMUNIZATION ADMIN: CPT | Performed by: OBSTETRICS & GYNECOLOGY

## 2024-02-05 PROCEDURE — 86900 BLOOD TYPING SEROLOGIC ABO: CPT

## 2024-02-05 PROCEDURE — 85027 COMPLETE CBC AUTOMATED: CPT

## 2024-02-05 PROCEDURE — 86901 BLOOD TYPING SEROLOGIC RH(D): CPT

## 2024-02-05 NOTE — PROGRESS NOTES
"Routine Prenatal Visit  [facility] - [address], [place] [address]    Assessment/Plan:  Meera is a 33 y.o. year old  at 28w0d who presents for routine prenatal visit.     1. 28 weeks gestation of pregnancy  -     POCT urine dip    2. Multigravida of advanced maternal age in third trimester    3. History of migraine headaches    4. Need for Tdap vaccination  -     TDAP VACCINE GREATER THAN OR EQUAL TO 8YO IM        Next OB Visit 2 weeks.    Subjective:     CC: Prenatal care    Meera Harvey is a 33 y.o.  female who presents for routine prenatal care at 28w0d.  Pregnancy ROS: no leakage of fluid, pelvic pain, or vaginal bleeding.  normal fetal movement.    The following portions of the patient's history were reviewed and updated as appropriate: allergies, current medications, past family history, past medical history, obstetric history, gynecologic history, past social history, past surgical history and problem list.      Objective:  /64 (BP Location: Right arm, Patient Position: Sitting, Cuff Size: Standard)   Ht 5' 7\" (1.702 m)   Wt 83.5 kg (184 lb)   LMP 2023 (Exact Date)   BMI 28.82 kg/m²   Pregravid Weight/BMI: 77.6 kg (171 lb) (BMI 26.78)  Current Weight: 83.5 kg (184 lb)   Total Weight Gain: 5.897 kg (13 lb)   Pre- Vitals      Flowsheet Row Most Recent Value   Prenatal Assessment    Prenatal Vitals    Blood Pressure 114/64   Weight - Scale 83.5 kg (184 lb)   Urine Albumin/Glucose    Dilation/Effacement/Station    Vaginal Drainage    Edema              General: Well appearing, no distress  Abdomen: Soft, gravid, nontender  Extremities: Non tender.  "

## 2024-02-19 ENCOUNTER — ROUTINE PRENATAL (OUTPATIENT)
Dept: OBGYN CLINIC | Facility: CLINIC | Age: 34
End: 2024-02-19
Payer: COMMERCIAL

## 2024-02-19 ENCOUNTER — OFFICE VISIT (OUTPATIENT)
Dept: OBGYN CLINIC | Facility: CLINIC | Age: 34
End: 2024-02-19
Payer: COMMERCIAL

## 2024-02-19 VITALS
DIASTOLIC BLOOD PRESSURE: 80 MMHG | SYSTOLIC BLOOD PRESSURE: 118 MMHG | HEIGHT: 67 IN | BODY MASS INDEX: 28.72 KG/M2 | WEIGHT: 183 LBS

## 2024-02-19 DIAGNOSIS — O09.523 MULTIGRAVIDA OF ADVANCED MATERNAL AGE IN THIRD TRIMESTER: Primary | ICD-10-CM

## 2024-02-19 DIAGNOSIS — Z29.13 NEED FOR RHOGAM DUE TO RH NEGATIVE MOTHER: Primary | ICD-10-CM

## 2024-02-19 DIAGNOSIS — Z3A.30 30 WEEKS GESTATION OF PREGNANCY: ICD-10-CM

## 2024-02-19 DIAGNOSIS — Z67.91 RH NEGATIVE STATE IN ANTEPARTUM PERIOD: ICD-10-CM

## 2024-02-19 DIAGNOSIS — O26.899 RH NEGATIVE STATE IN ANTEPARTUM PERIOD: ICD-10-CM

## 2024-02-19 LAB
SL AMB  POCT GLUCOSE, UA: NORMAL
SL AMB POCT URINE PROTEIN: NORMAL

## 2024-02-19 PROCEDURE — PNV: Performed by: OBSTETRICS & GYNECOLOGY

## 2024-02-19 PROCEDURE — 81002 URINALYSIS NONAUTO W/O SCOPE: CPT | Performed by: OBSTETRICS & GYNECOLOGY

## 2024-02-19 PROCEDURE — 96372 THER/PROPH/DIAG INJ SC/IM: CPT

## 2024-02-19 NOTE — PROGRESS NOTES
"Routine Prenatal Visit  [facility] - [place] [address], [address], [place] [address]    Assessment/Plan:  Meera is a 33 y.o. year old  at 30w0d who presents for routine prenatal visit.     1. Multigravida of advanced maternal age in third trimester    2. 30 weeks gestation of pregnancy  -     POCT urine dip    3. Rh negative state in antepartum period  Assessment & Plan:  Needs rhogam, none at [place] office, so will go to [place]            Subjective:   Meera Harvey is a 33 y.o.  who presents for routine prenatal care at 30w0d.  Complaints today: Denies  LOF: -; VB: -; Contractions: -; FM: +    Objective:  /80 (BP Location: Left arm, Patient Position: Sitting, Cuff Size: Standard)   Ht 5' 7\" (1.702 m)   Wt 83 kg (183 lb)   LMP 2023 (Exact Date)   BMI 28.66 kg/m²     General: Well appearing, no distress  Respiratory: Unlabored breathing  Abdomen: Soft, gravid, nontender  Extremities: Warm and well perfused.  Non tender.    Pregravid Weight/BMI: 77.6 kg (171 lb) (BMI 26.78)  Current Weight: 83 kg (183 lb)   Total Weight Gain: 5.443 kg (12 lb)     Pre- Vitals      Flowsheet Row Most Recent Value   Prenatal Assessment    Fetal Heart Rate 147   Fundal Height (cm) 30 cm   Movement Present   Prenatal Vitals    Blood Pressure 118/80   Weight - Scale 83 kg (183 lb)   Urine Albumin/Glucose    Dilation/Effacement/Station    Vaginal Drainage    Edema              Shil V Jamye, DO  2024 2:37 PM    "

## 2024-03-05 ENCOUNTER — ROUTINE PRENATAL (OUTPATIENT)
Dept: OBGYN CLINIC | Facility: CLINIC | Age: 34
End: 2024-03-05
Payer: COMMERCIAL

## 2024-03-05 ENCOUNTER — NURSE TRIAGE (OUTPATIENT)
Age: 34
End: 2024-03-05

## 2024-03-05 VITALS
BODY MASS INDEX: 29.66 KG/M2 | HEIGHT: 67 IN | DIASTOLIC BLOOD PRESSURE: 74 MMHG | WEIGHT: 189 LBS | SYSTOLIC BLOOD PRESSURE: 112 MMHG

## 2024-03-05 DIAGNOSIS — Z3A.32 32 WEEKS GESTATION OF PREGNANCY: ICD-10-CM

## 2024-03-05 DIAGNOSIS — O09.523 MULTIGRAVIDA OF ADVANCED MATERNAL AGE IN THIRD TRIMESTER: Primary | ICD-10-CM

## 2024-03-05 DIAGNOSIS — Z86.69 HISTORY OF MIGRAINE HEADACHES: ICD-10-CM

## 2024-03-05 LAB
SL AMB  POCT GLUCOSE, UA: NORMAL
SL AMB POCT URINE PROTEIN: NORMAL

## 2024-03-05 PROCEDURE — 81002 URINALYSIS NONAUTO W/O SCOPE: CPT | Performed by: NURSE PRACTITIONER

## 2024-03-05 PROCEDURE — PNV: Performed by: NURSE PRACTITIONER

## 2024-03-05 NOTE — PATIENT INSTRUCTIONS
Doing well. Reviewed FMC, s/s PTL. Discussed ligament pain, try support band, Tylenol, heat, stretching. Call office with any concerns.

## 2024-03-05 NOTE — PROGRESS NOTES
"Routine Prenatal Visit  [facility] - [place] [address], [address], [place] [address]    Assessment/Plan:  Meera is a 33 y.o. year old  at 32w1d who presents for routine prenatal visit. Doing well. Reviewed FMC, s/s PTL. Discussed ligament pain, try support band, Tylenol, heat, stretching. Call office with any concerns.     1. Multigravida of advanced maternal age in third trimester    2. History of migraine headaches    3. 32 weeks gestation of pregnancy  -     POCT urine dip        Next OB Visit 2 weeks.    Subjective:     CC: Prenatal care    Meera Harvey is a 33 y.o.  female who presents for routine prenatal care at 32w1d.  Pregnancy ROS: no leakage of fluid, pelvic pain, or vaginal bleeding.  normal fetal movement.  Feeling well today, c/o groin pain at times, finds heat and stretching helpful.     The following portions of the patient's history were reviewed and updated as appropriate: allergies, current medications, past family history, past medical history, obstetric history, gynecologic history, past social history, past surgical history and problem list.      Objective:  /74 (BP Location: Left arm, Patient Position: Sitting, Cuff Size: Standard)   Ht 5' 7\" (1.702 m)   Wt 85.7 kg (189 lb)   LMP 2023 (Exact Date)   BMI 29.60 kg/m²   Pregravid Weight/BMI: 77.6 kg (171 lb) (BMI 26.78)  Current Weight: 85.7 kg (189 lb)   Total Weight Gain: 8.165 kg (18 lb)   Pre-Matt Vitals      Flowsheet Row Most Recent Value   Prenatal Assessment    Fetal Heart Rate 153   Fundal Height (cm) 33 cm   Movement Present   Presentation Breech   Prenatal Vitals    Blood Pressure 112/74   Weight - Scale 85.7 kg (189 lb)   Urine Albumin/Glucose    Dilation/Effacement/Station    Vaginal Drainage    Draining Fluid No   Edema    LLE Edema None   RLE Edema None   Facial Edema None             General: Well appearing, no distress  Abdomen: Soft, gravid, nontender  Extremities: Non " tender.

## 2024-03-11 ENCOUNTER — ULTRASOUND (OUTPATIENT)
Dept: PERINATAL CARE | Facility: OTHER | Age: 34
End: 2024-03-11
Payer: COMMERCIAL

## 2024-03-11 VITALS
SYSTOLIC BLOOD PRESSURE: 110 MMHG | BODY MASS INDEX: 29.18 KG/M2 | DIASTOLIC BLOOD PRESSURE: 60 MMHG | HEART RATE: 96 BPM | WEIGHT: 185.9 LBS | HEIGHT: 67 IN

## 2024-03-11 DIAGNOSIS — O09.523 MULTIGRAVIDA OF ADVANCED MATERNAL AGE IN THIRD TRIMESTER: Primary | ICD-10-CM

## 2024-03-11 DIAGNOSIS — Z36.89 ENCOUNTER FOR ULTRASOUND TO CHECK FETAL GROWTH: ICD-10-CM

## 2024-03-11 PROCEDURE — 76816 OB US FOLLOW-UP PER FETUS: CPT | Performed by: OBSTETRICS & GYNECOLOGY

## 2024-03-11 NOTE — PROGRESS NOTES
"[facility]: Ms. Harvey was seen today for fetal growth and followup missed anatomy ultrasound.  See ultrasound report under \"OB Procedures\" tab.   Please don't hesitate to contact our office with any concerns or questions.  -Liliana Rosario MD    "

## 2024-03-18 ENCOUNTER — ROUTINE PRENATAL (OUTPATIENT)
Dept: OBGYN CLINIC | Facility: CLINIC | Age: 34
End: 2024-03-18
Payer: COMMERCIAL

## 2024-03-18 VITALS
SYSTOLIC BLOOD PRESSURE: 106 MMHG | WEIGHT: 187.8 LBS | BODY MASS INDEX: 29.47 KG/M2 | HEIGHT: 67 IN | DIASTOLIC BLOOD PRESSURE: 68 MMHG

## 2024-03-18 DIAGNOSIS — Z67.91 RH NEGATIVE STATE IN ANTEPARTUM PERIOD: ICD-10-CM

## 2024-03-18 DIAGNOSIS — Z86.69 HISTORY OF MIGRAINE HEADACHES: ICD-10-CM

## 2024-03-18 DIAGNOSIS — O09.523 MULTIGRAVIDA OF ADVANCED MATERNAL AGE IN THIRD TRIMESTER: Primary | ICD-10-CM

## 2024-03-18 DIAGNOSIS — Z3A.34 34 WEEKS GESTATION OF PREGNANCY: ICD-10-CM

## 2024-03-18 DIAGNOSIS — O26.899 RH NEGATIVE STATE IN ANTEPARTUM PERIOD: ICD-10-CM

## 2024-03-18 LAB
SL AMB  POCT GLUCOSE, UA: NORMAL
SL AMB POCT URINE PROTEIN: NORMAL

## 2024-03-18 PROCEDURE — 81002 URINALYSIS NONAUTO W/O SCOPE: CPT | Performed by: STUDENT IN AN ORGANIZED HEALTH CARE EDUCATION/TRAINING PROGRAM

## 2024-03-18 PROCEDURE — PNV: Performed by: STUDENT IN AN ORGANIZED HEALTH CARE EDUCATION/TRAINING PROGRAM

## 2024-03-18 NOTE — ASSESSMENT & PLAN NOTE
- PTL/PPROM/Bleeding precautions given. Kick counts reviewed.  - Reviewed plan for collection of GBS swab at 36 weeks.  - Problem list updated, results console reviewed and updated with pertinent prenatal labs.  - PMH, PSH, medications reviewed and updated as needed  - Return to office in 2 wk(s) for routine prenatal care

## 2024-03-18 NOTE — PROGRESS NOTES
"Routine Prenatal Visit  [facility] - [place]  [address] [place] [address]    Assessment/Plan:  Meera is a 33 y.o. year old  at 34w0d who presents for routine prenatal visit.     1. Multigravida of advanced maternal age in third trimester    2. 34 weeks gestation of pregnancy  Assessment & Plan:  - PTL/PPROM/Bleeding precautions given. Kick counts reviewed.  - Reviewed plan for collection of GBS swab at 36 weeks.  - Problem list updated, results console reviewed and updated with pertinent prenatal labs.  - PMH, PSH, medications reviewed and updated as needed  - Return to office in 2 wk(s) for routine prenatal care      Orders:  -     POCT urine dip    3. Rh negative state in antepartum period    4. History of migraine headaches          Subjective:   Meera Harvey is a 33 y.o.  who presents for routine prenatal care at 34w0d.  Complaints today: No  LOF: No; VB: No; Contractions: No; FM: Present and normal    Objective:  /68 (BP Location: Left arm, Patient Position: Sitting, Cuff Size: Standard)   Ht 5' 7\" (1.702 m)   Wt 85.2 kg (187 lb 12.8 oz)   LMP 2023 (Exact Date)   BMI 29.41 kg/m²     General: Well appearing, no distress  Respiratory: Unlabored breathing  Cardiovascular: Regular rate.  Abdomen: Soft, gravid, nontender  Extremities: Warm and well perfused.  Non tender.    Pregravid Weight/BMI: 77.6 kg (171 lb) (BMI 26.78)  Current Weight: 85.2 kg (187 lb 12.8 oz)   Total Weight Gain: 7.62 kg (16 lb 12.8 oz)     Pre-Matt Vitals      Flowsheet Row Most Recent Value   Prenatal Assessment    Fetal Heart Rate 145   Fundal Height (cm) 34 cm   Movement Present   Presentation Vertex   Prenatal Vitals    Blood Pressure 106/68   Weight - Scale 85.2 kg (187 lb 12.8 oz)   Urine Albumin/Glucose    Dilation/Effacement/Station    Vaginal Drainage    Draining Fluid No   Edema    LLE Edema None   RLE Edema None   Facial Edema None             Alireza Riggins MD  3/18/2024 " 10:16 AM

## 2024-03-25 ENCOUNTER — NURSE TRIAGE (OUTPATIENT)
Age: 34
End: 2024-03-25

## 2024-03-25 NOTE — TELEPHONE ENCOUNTER
"Patient called, 35.0 weeks. C/o nausea and is vomiting (unable to keep solids down). Patient has a fever of 101 (tympanic), taking Tylenol 1000 mg Q 8 hrs. Symptoms started yesterday, he son is ill with a GI bug and believes she caught it from him. Yesterday had diarrhea twice. No diarrhea today. +FM. Denies vaginal bleeding, loss of fluid, contractions, dry mouth or signs of dehydration, lightheadedness and dizziness, shortness of breath, chest pain. No recent travel. Reviewed with patient to take small frequent sips of water and Gatorade/powerade, eat small frequent meals but keep it simple toast, crackers, rice, applesauce. Advance as she starts to feel better. Rest. Continue taking Tylenol Q8hrs PRN but inform us if HA or fever are not resolved by Tylenol. Patient aware to contact office if she has contractions, vaginal bleeding or abdominal pain, or loss of fluid.     Tiger Text to Dr. Keane - no additional recommendations at this time.     Reason for Disposition   Pregnancy and effects of fever, questions about    Answer Assessment - Initial Assessment Questions  1. TEMPERATURE: \"What is the most recent temperature?\"  \"How was it measured?\"       101 tympanic - taking tylenol 1,000 MG Q8 hrs   2. ONSET: \"When did the fever start?\"       Started yesterday   3. SYMPTOMS: \"Do you have any other symptoms besides the fever?\"   (e.g., cough, cold symptoms, sore throat, rash, diarrhea, vomiting, abdominal pain, urine problems)      Nausea, fever, unable to keep food down.   Yesterday had diarrhea twice. No diarrhea today.    4. CAUSE: If there are no symptoms, ask: \"What do you think is causing the fever?\"       Son is sick   5. CONTACTS: \"Does anyone else in the family have an infection?\"      Son   9. SHANNAN: \"What date are you expecting to deliver?      4/29/24    Protocols used: Pregnancy - Fever-ADULT-[place]    "

## 2024-03-27 ENCOUNTER — HOSPITAL ENCOUNTER (OUTPATIENT)
Facility: HOSPITAL | Age: 34
Discharge: HOME | End: 2024-03-27
Attending: OBSTETRICS & GYNECOLOGY | Admitting: OBSTETRICS & GYNECOLOGY
Payer: COMMERCIAL

## 2024-03-27 ENCOUNTER — NURSE TRIAGE (OUTPATIENT)
Dept: OTHER | Facility: OTHER | Age: 34
End: 2024-03-27

## 2024-03-27 ENCOUNTER — PATIENT MESSAGE (OUTPATIENT)
Dept: OBGYN CLINIC | Facility: CLINIC | Age: 34
End: 2024-03-27

## 2024-03-27 VITALS
HEART RATE: 74 BPM | SYSTOLIC BLOOD PRESSURE: 116 MMHG | RESPIRATION RATE: 18 BRPM | DIASTOLIC BLOOD PRESSURE: 77 MMHG | TEMPERATURE: 98.3 F

## 2024-03-27 PROBLEM — O21.9 VOMITING AFFECTING PREGNANCY, ANTEPARTUM: Status: ACTIVE | Noted: 2024-03-27

## 2024-03-27 LAB
ALBUMIN SERPL BCP-MCNC: 3.5 G/DL (ref 3.5–5)
ALP SERPL-CCNC: 63 U/L (ref 34–104)
ALT SERPL W P-5'-P-CCNC: 31 U/L (ref 7–52)
ANION GAP SERPL CALCULATED.3IONS-SCNC: 8 MMOL/L (ref 4–13)
AST SERPL W P-5'-P-CCNC: 35 U/L (ref 13–39)
BASOPHILS # BLD AUTO: 0.02 THOUSANDS/ÂΜL (ref 0–0.1)
BASOPHILS NFR BLD AUTO: 0 % (ref 0–1)
BILIRUB SERPL-MCNC: 0.99 MG/DL (ref 0.2–1)
BUN SERPL-MCNC: 9 MG/DL (ref 5–25)
CALCIUM SERPL-MCNC: 7.9 MG/DL (ref 8.4–10.2)
CHLORIDE SERPL-SCNC: 107 MMOL/L (ref 96–108)
CO2 SERPL-SCNC: 18 MMOL/L (ref 21–32)
CREAT SERPL-MCNC: 0.58 MG/DL (ref 0.6–1.3)
EOSINOPHIL # BLD AUTO: 0.01 THOUSAND/ÂΜL (ref 0–0.61)
EOSINOPHIL NFR BLD AUTO: 0 % (ref 0–6)
ERYTHROCYTE [DISTWIDTH] IN BLOOD BY AUTOMATED COUNT: 12.8 % (ref 11.6–15.1)
GFR SERPL CREATININE-BSD FRML MDRD: 120 ML/MIN/1.73SQ M
GLUCOSE SERPL-MCNC: 75 MG/DL (ref 65–140)
HCT VFR BLD AUTO: 36.5 % (ref 34.8–46.1)
HGB BLD-MCNC: 11.9 G/DL (ref 11.5–15.4)
IMM GRANULOCYTES # BLD AUTO: 0.06 THOUSAND/UL (ref 0–0.2)
IMM GRANULOCYTES NFR BLD AUTO: 1 % (ref 0–2)
LYMPHOCYTES # BLD AUTO: 1.44 THOUSANDS/ÂΜL (ref 0.6–4.47)
LYMPHOCYTES NFR BLD AUTO: 25 % (ref 14–44)
MCH RBC QN AUTO: 28.9 PG (ref 26.8–34.3)
MCHC RBC AUTO-ENTMCNC: 32.6 G/DL (ref 31.4–37.4)
MCV RBC AUTO: 89 FL (ref 82–98)
MONOCYTES # BLD AUTO: 0.55 THOUSAND/ÂΜL (ref 0.17–1.22)
MONOCYTES NFR BLD AUTO: 9 % (ref 4–12)
NEUTROPHILS # BLD AUTO: 3.79 THOUSANDS/ÂΜL (ref 1.85–7.62)
NEUTS SEG NFR BLD AUTO: 65 % (ref 43–75)
NRBC BLD AUTO-RTO: 0 /100 WBCS
PLATELET # BLD AUTO: 190 THOUSANDS/UL (ref 149–390)
PMV BLD AUTO: 8.8 FL (ref 8.9–12.7)
POTASSIUM SERPL-SCNC: 3.3 MMOL/L (ref 3.5–5.3)
PROT SERPL-MCNC: 6.9 G/DL (ref 6.4–8.4)
RBC # BLD AUTO: 4.12 MILLION/UL (ref 3.81–5.12)
SODIUM SERPL-SCNC: 133 MMOL/L (ref 135–147)
WBC # BLD AUTO: 5.87 THOUSAND/UL (ref 4.31–10.16)

## 2024-03-27 PROCEDURE — 80053 COMPREHEN METABOLIC PANEL: CPT | Performed by: OBSTETRICS & GYNECOLOGY

## 2024-03-27 PROCEDURE — NC001 PR NO CHARGE: Performed by: OBSTETRICS & GYNECOLOGY

## 2024-03-27 PROCEDURE — 99213 OFFICE O/P EST LOW 20 MIN: CPT

## 2024-03-27 PROCEDURE — 85025 COMPLETE CBC W/AUTO DIFF WBC: CPT | Performed by: OBSTETRICS & GYNECOLOGY

## 2024-03-27 RX ORDER — ONDANSETRON 2 MG/ML
4 INJECTION INTRAMUSCULAR; INTRAVENOUS ONCE
Status: COMPLETED | OUTPATIENT
Start: 2024-03-27 | End: 2024-03-27

## 2024-03-27 RX ORDER — POTASSIUM CHLORIDE 20 MEQ/1
20 TABLET, EXTENDED RELEASE ORAL ONCE
Status: COMPLETED | OUTPATIENT
Start: 2024-03-27 | End: 2024-03-27

## 2024-03-27 RX ADMIN — DEXTROSE, SODIUM CHLORIDE, SODIUM LACTATE, POTASSIUM CHLORIDE, AND CALCIUM CHLORIDE 500 ML: 5; .6; .31; .03; .02 INJECTION, SOLUTION INTRAVENOUS at 20:45

## 2024-03-27 RX ADMIN — ONDANSETRON 4 MG: 2 INJECTION INTRAMUSCULAR; INTRAVENOUS at 19:38

## 2024-03-27 RX ADMIN — POTASSIUM CHLORIDE 20 MEQ: 1500 TABLET, EXTENDED RELEASE ORAL at 21:17

## 2024-03-27 RX ADMIN — DEXTROSE, SODIUM CHLORIDE, SODIUM LACTATE, POTASSIUM CHLORIDE, AND CALCIUM CHLORIDE 500 ML: 5; .6; .31; .03; .02 INJECTION, SOLUTION INTRAVENOUS at 19:34

## 2024-03-27 NOTE — H&P
OB H&P  Meera Nelsy Harvey  1990  LD TRIAGE 02/LD TRIAGE 2*      33 y.o. yo  at 35 weeks by us and lmp    Chief complaint:  Vomiting and diarrhea    HPI:  Pt presents with above  since 4 days ago.  Son and his entire day care have a GI infection going around that has been lasting 4-5 days..  Her son just started improving today, she is not improving yet on day 4. She has worsening vomiting today which is what prompted the visit here, beginning to feel a little dizzy and weak        Pregnancy Complications:  Patient Active Problem List   Diagnosis    Rh negative state in antepartum period    Bleeding in early pregnancy    34 weeks gestation of pregnancy    Multigravida of advanced maternal age in third trimester    History of migraine headaches         Past Medical History:  Past Medical History:   Diagnosis Date    Abnormal Pap smear of cervix     just follow up [id number] abnormal since    Migraine     seasonal-food related-last episode 2023. No neuro work up. Resolve with tylenol/advil    UTI (urinary tract infection)     Last episode     Varicella     chicken pox as child       Past Surgical History:  No past surgical history on file.    Social Hx:  Social History     Socioeconomic History    Marital status: /Civil Union     Spouse name: Not on file    Number of children: Not on file    Years of education: Not on file    Highest education level: Not on file   Occupational History    Not on file   Tobacco Use    Smoking status: Never    Smokeless tobacco: Never   Vaping Use    Vaping status: Never Used   Substance and Sexual Activity    Alcohol use: Not Currently    Drug use: Never    Sexual activity: Yes     Partners: Male     Birth control/protection: Other, None     Comment: no new partner in past year   Other Topics Concern    Not on file   Social History Narrative    Not on file     Social Determinants of Health     Financial Resource Strain: Not on file   Food Insecurity: No Food  "Insecurity (3/27/2024)    Hunger Vital Sign     Worried About Running Out of Food in the Last Year: Never true     Ran Out of Food in the Last Year: Never true   Transportation Needs: No Transportation Needs (3/27/2024)    PRAPARE - Transportation     Lack of Transportation (Medical): No     Lack of Transportation (Non-Medical): No   Physical Activity: Not on file   Stress: Not on file   Social Connections: Not on file   Intimate Partner Violence: Not on file   Housing Stability: Low Risk  (3/27/2024)    Housing Stability Vital Sign     Unable to Pay for Housing in the Last Year: No     Number of Places Lived in the Last Year: 1     Unstable Housing in the Last Year: No       Meds:  No current facility-administered medications on file prior to encounter.     Current Outpatient Medications on File Prior to Encounter   Medication Sig Dispense Refill    Prenatal MV-Min-Fe Fum-FA-DHA (PRENATAL 1 PO) Take 1 tablet by mouth         Allergies:  No Known Allergies    Obstetric History:    G 3 P 1011    Labs:  No results found for: \"STREPGRPB\"  Type & Screen:  ABO Grouping   Date Value Ref Range Status   02/05/2024 A  Final      Rh Factor   Date Value Ref Range Status   02/05/2024 Negative  Final    No results found for: \"ANTIBODYSCR\"    Specimen Expiration Date   Date Value Ref Range Status   10/16/2023 [id number]  Final     HIV-1/HIV-2 Ab   Date Value Ref Range Status   12/30/2021 Non-Reactive Non-Reactive Final     Hepatitis B Surface Ag   Date Value Ref Range Status   10/16/2023 Non-reactive Non-Reactive Final     RPR   Date Value Ref Range Status   08/03/2022 Non-Reactive Non-Reactive Final     Rubella IgG Quant   Date Value Ref Range Status   10/16/2023 47.9 >14.9 IU/mL Final     Glucose   Date Value Ref Range Status   02/05/2024 67 40 - 134 mg/dL Final     Comment:     <=134 Normal   135-179 Impaired glucose fasting. Perform 3 Hour Glucose Tolerance   >=180 Diagnosis Gestational Diabetes Mellitus           Physical " Exam:  Vital signs:  There were no vitals filed for this visit.    Heart: RRR  Lungs: clear to ausculation   Abdomen: soft, nontender, gravid,   Estimated Fetal Weight: 5 lbs  Extremeties: min edema, no ernie's or aiden's sign  Presentation: vertex  Cervix: deferred  FHR: cat 1  CTXN: one in a one hour time period      Assessment/Plan:   at 35 weeks.   Gastoenteritis   IVF   Labs   Zofran IV

## 2024-03-27 NOTE — TELEPHONE ENCOUNTER
"Reason for Disposition  • [1] Drinking very little AND [2] dehydration suspected (e.g., no urine > 12 hours, very dry mouth, very lightheaded)    Answer Assessment - Initial Assessment Questions  1. VOMITING SEVERITY: \"How many times have you vomited in the past 24 hours?\"      - MILD:  1 - 2 times/day     - MODERATE: 3 - 5 times/day, decreased oral intake without significant weight loss or symptoms of dehydration     - SEVERE: 6 or more times/day, vomits everything or nearly everything, with significant weight loss, symptoms of dehydration       Severe 5 times vomiting  2. ONSET: \"When did the vomiting begin?\"       Started Saturday but got worse today  3. FLUIDS: \"What fluids or food have you vomited up today?\" \"Have you been able to keep any fluids down?\"      Water and Pedialyte up until 1300 because she gets sick and has to vomit.   4. ABDOMINAL PAIN: \"Are your having any abdominal pain?\" If yes : \"How bad is it and what does it feel like?\" (e.g., crampy, dull, intermittent, constant)       Lower abdominal pain comes and goes.  5. DIARRHEA: \"Is there any diarrhea?\" If Yes, ask: \"How many times today?\"       Diarrhea since 1400.  Voided last at 1100  6. CONTACTS: \"Is there anyone else in the family with the same symptoms?\"       Son had same SXS  7. CAUSE: \"What do you think is causing your vomiting?\"      Virus  8. HYDRATION STATUS: \"Any signs of dehydration?\" (e.g., dry mouth [not only dry lips], too weak to stand) \"When did you last urinate?\"      Mouth is dry  9. OTHER SYMPTOMS: \"Do you have any other symptoms?\" (e.g., fever, headache, vertigo, vomiting blood or coffee grounds, recent head injury)      Some lightheadedness and dizziness, headache  10. PREGNANCY: \"Is there any chance you are pregnant?\" \"When was your last menstrual period?\"        35wks  SHANNAN: 4/29/2024 G:3 P:1 AB:1   Positive fetal movement. No ROM or vaginal bleeding.    Protocols used: Vomiting-ADULT-[place]    "

## 2024-03-27 NOTE — TELEPHONE ENCOUNTER
Regardin 1/2 weeks pregnant have a ongoing stomach bug that has gone worse throwing up  ----- Message from Rashad Herrera sent at 3/27/2024  5:45 PM EDT -----  '' I'm 35 1/2 weeks pregnant. I have a ongoing stomach bug that has gone worse. I'm throwing up concerned and looking for medical advice on what to do.''

## 2024-03-28 NOTE — QUICK NOTE
Labs with K 3.3  Pt feeling a little better after fluids  Has not vomited or had diarrhea since arrival here, which is an improvement in her status    OK for discharge,  Will give Kdur prior to discharge    Discussed by phone with Dr. Suazo

## 2024-03-28 NOTE — PROCEDURES
Meera Harvey, a  at 35w2d with an SHANNAN of 2024, by Last Menstrual Period, was seen at [facility] for the following procedure(s):  ]      NST  Baseline 140  Variability moderate  accels yes  decels no  Ctx- no    Reactive NST    Pt has appt for fu   Currently has no complaints

## 2024-04-01 ENCOUNTER — ROUTINE PRENATAL (OUTPATIENT)
Dept: OBGYN CLINIC | Facility: CLINIC | Age: 34
End: 2024-04-01

## 2024-04-01 VITALS — WEIGHT: 186 LBS | SYSTOLIC BLOOD PRESSURE: 98 MMHG | BODY MASS INDEX: 29.13 KG/M2 | DIASTOLIC BLOOD PRESSURE: 62 MMHG

## 2024-04-01 DIAGNOSIS — Z3A.36 36 WEEKS GESTATION OF PREGNANCY: ICD-10-CM

## 2024-04-01 DIAGNOSIS — O09.523 MULTIGRAVIDA OF ADVANCED MATERNAL AGE IN THIRD TRIMESTER: Primary | ICD-10-CM

## 2024-04-01 DIAGNOSIS — Z36.89 ENCOUNTER FOR OTHER SPECIFIED ANTENATAL SCREENING: ICD-10-CM

## 2024-04-01 DIAGNOSIS — Z36.85 ANTENATAL SCREENING FOR STREPTOCOCCUS B: ICD-10-CM

## 2024-04-01 PROCEDURE — 87150 DNA/RNA AMPLIFIED PROBE: CPT | Performed by: OBSTETRICS & GYNECOLOGY

## 2024-04-01 PROCEDURE — PNV: Performed by: OBSTETRICS & GYNECOLOGY

## 2024-04-01 NOTE — PROGRESS NOTES
Routine Prenatal Visit  [facility] - [place] [address], [address], [place] [address]    Assessment/Plan:  Meera is a 33 y.o. year old  at 36w0d who presents for routine prenatal visit.     1. Multigravida of advanced maternal age in third trimester    2. 36 weeks gestation of pregnancy  Assessment & Plan:  - Labor/Bleeding/ROM precautions given. Kick counts reviewed  - GBS swab collected today.  - Delivery consent reviewed and signed today. Risks of delivery reviewed, including bleeding, infection, damage to surrounding organs/structures (specifically bowel, bladder, vessels, nerves, ureters), need for operative vaginal delivery or  delivery, hysterectomy, need for blood transfusion, need for further surgery.   - Problem list updated, results console reviewed and updated with pertinent prenatal labs.  - PMH, PSH, medications reviewed and updated as needed  - Return to office in 1 wk(s) for routine prenatal care     Orders:  -     POCT urine dip    3. Encounter for other specified  screening  -     Strep B DNA probe, amplification    4.  screening for streptococcus B  -     Strep B DNA probe, amplification          Subjective:   Meera Harvey is a 33 y.o.  who presents for routine prenatal care at 36w0d.  Complaints today: Denies, recently got over GI bug but feels a lot better.   LOF: -; VB: -; Contractions: -; FM: +    Objective:  BP 98/62   Wt 84.4 kg (186 lb)   LMP 2023 (Exact Date)   BMI 29.13 kg/m²     General: Well appearing, no distress  Respiratory: Unlabored breathing  Abdomen: Soft, gravid, nontender  Extremities: Warm and well perfused.  Non tender.    Pregravid Weight/BMI: 77.6 kg (171 lb) (BMI 26.78)  Current Weight: 84.4 kg (186 lb)   Total Weight Gain: 6.804 kg (15 lb)     Pre- Vitals      Flowsheet Row Most Recent Value   Prenatal Assessment    Fetal Heart Rate 141   Fundal Height (cm) 36 cm   Movement Present   Prenatal  Vitals    Blood Pressure 98/62   Weight - Scale 84.4 kg (186 lb)   Urine Albumin/Glucose    Dilation/Effacement/Station    Vaginal Drainage    Edema              Miguelito Concepcion DO  4/1/2024 10:28 AM

## 2024-04-01 NOTE — ASSESSMENT & PLAN NOTE
- Labor/Bleeding/ROM precautions given. Kick counts reviewed  - GBS swab collected today.  - Delivery consent reviewed and signed today. Risks of delivery reviewed, including bleeding, infection, damage to surrounding organs/structures (specifically bowel, bladder, vessels, nerves, ureters), need for operative vaginal delivery or  delivery, hysterectomy, need for blood transfusion, need for further surgery.   - Problem list updated, results console reviewed and updated with pertinent prenatal labs.  - PMH, PSH, medications reviewed and updated as needed  - Return to office in 1 wk(s) for routine prenatal care

## 2024-04-03 LAB — GP B STREP DNA SPEC QL NAA+PROBE: NEGATIVE

## 2024-04-08 PROBLEM — Z3A.37 37 WEEKS GESTATION OF PREGNANCY: Status: ACTIVE | Noted: 2023-09-22

## 2024-04-09 ENCOUNTER — ROUTINE PRENATAL (OUTPATIENT)
Dept: OBGYN CLINIC | Facility: CLINIC | Age: 34
End: 2024-04-09
Payer: COMMERCIAL

## 2024-04-09 ENCOUNTER — TELEPHONE (OUTPATIENT)
Dept: OBGYN CLINIC | Facility: CLINIC | Age: 34
End: 2024-04-09

## 2024-04-09 VITALS
DIASTOLIC BLOOD PRESSURE: 72 MMHG | HEIGHT: 67 IN | SYSTOLIC BLOOD PRESSURE: 116 MMHG | WEIGHT: 189.6 LBS | BODY MASS INDEX: 29.76 KG/M2

## 2024-04-09 DIAGNOSIS — O09.523 MULTIGRAVIDA OF ADVANCED MATERNAL AGE IN THIRD TRIMESTER: Primary | ICD-10-CM

## 2024-04-09 DIAGNOSIS — Z67.91 RH NEGATIVE STATE IN ANTEPARTUM PERIOD: ICD-10-CM

## 2024-04-09 DIAGNOSIS — Z3A.37 37 WEEKS GESTATION OF PREGNANCY: ICD-10-CM

## 2024-04-09 DIAGNOSIS — Z86.69 HISTORY OF MIGRAINE HEADACHES: ICD-10-CM

## 2024-04-09 DIAGNOSIS — O26.899 RH NEGATIVE STATE IN ANTEPARTUM PERIOD: ICD-10-CM

## 2024-04-09 LAB
SL AMB  POCT GLUCOSE, UA: NORMAL
SL AMB POCT URINE PROTEIN: NORMAL

## 2024-04-09 PROCEDURE — PNV: Performed by: STUDENT IN AN ORGANIZED HEALTH CARE EDUCATION/TRAINING PROGRAM

## 2024-04-09 PROCEDURE — 81002 URINALYSIS NONAUTO W/O SCOPE: CPT | Performed by: STUDENT IN AN ORGANIZED HEALTH CARE EDUCATION/TRAINING PROGRAM

## 2024-04-09 NOTE — ASSESSMENT & PLAN NOTE
- Labor/Bleeding/ROM precautions given. Kick counts reviewed.  - GBS negative result reviewed.   - Reviewed timing of delivery, including option for elective induction of labor as early as 39 weeks versus awaiting spontaneous onset of labor. Patient desires expectant management for now with plan for IOL after SHANNAN unless clinical change.  - Problem list updated, results console reviewed and updated with pertinent prenatal labs.  - PMH, PSH, medications reviewed and updated as needed  - Return to office in 1 wk(s) for routine prenatal care

## 2024-04-09 NOTE — PROGRESS NOTES
"Routine Prenatal Visit  [facility] - [place]  [address] [place] [address]    Assessment/Plan:  Meera is a 33 y.o. year old  at 37w1d who presents for routine prenatal visit.     1. Multigravida of advanced maternal age in third trimester    2. Rh negative state in antepartum period    3. 37 weeks gestation of pregnancy  Assessment & Plan:  - Labor/Bleeding/ROM precautions given. Kick counts reviewed.  - GBS negative result reviewed.   - Reviewed timing of delivery, including option for elective induction of labor as early as 39 weeks versus awaiting spontaneous onset of labor. Patient desires expectant management for now with plan for IOL after SHANNAN unless clinical change.  - Problem list updated, results console reviewed and updated with pertinent prenatal labs.  - PMH, PSH, medications reviewed and updated as needed  - Return to office in 1 wk(s) for routine prenatal care      Orders:  -     POCT urine dip    4. History of migraine headaches          Subjective:   Meera Harvey is a 33 y.o.  who presents for routine prenatal care at 37w1d.  Complaints today: Discomforts of pregnancy.   LOF: No; VB: No; Contractions: No; FM: Present and normal    Objective:  /72 (BP Location: Left arm, Patient Position: Sitting, Cuff Size: Standard)   Ht 5' 7\" (1.702 m)   Wt 86 kg (189 lb 9.6 oz)   LMP 2023 (Exact Date)   BMI 29.70 kg/m²     General: Well appearing, no distress  Respiratory: Unlabored breathing  Cardiovascular: Regular rate.  Abdomen: Soft, gravid, nontender  Extremities: Warm and well perfused.  Non tender.    Pregravid Weight/BMI: 77.6 kg (171 lb) (BMI 26.78)  Current Weight: 86 kg (189 lb 9.6 oz)   Total Weight Gain: 8.437 kg (18 lb 9.6 oz)     Pre-Matt Vitals      Flowsheet Row Most Recent Value   Prenatal Assessment    Fetal Heart Rate 155   Fundal Height (cm) 37 cm   Movement Present   Prenatal Vitals    Blood Pressure 116/72   Weight - Scale 86 kg (189 " lb 9.6 oz)   Urine Albumin/Glucose    Dilation/Effacement/Station    Vaginal Drainage    Draining Fluid No   Edema    LLE Edema None   RLE Edema None   Facial Edema None             Alireza Riggins MD  4/9/2024 9:07 AM

## 2024-04-09 NOTE — TELEPHONE ENCOUNTER
4/9/24 LMOM regarding not receiving short-term disability documents to kindly have them faxed over to [phone]

## 2024-04-17 ENCOUNTER — ROUTINE PRENATAL (OUTPATIENT)
Dept: OBGYN CLINIC | Facility: CLINIC | Age: 34
End: 2024-04-17
Payer: COMMERCIAL

## 2024-04-17 VITALS — WEIGHT: 192.6 LBS | SYSTOLIC BLOOD PRESSURE: 104 MMHG | DIASTOLIC BLOOD PRESSURE: 60 MMHG | BODY MASS INDEX: 30.17 KG/M2

## 2024-04-17 DIAGNOSIS — Z3A.38 38 WEEKS GESTATION OF PREGNANCY: Primary | ICD-10-CM

## 2024-04-17 DIAGNOSIS — O26.899 RH NEGATIVE STATE IN ANTEPARTUM PERIOD: ICD-10-CM

## 2024-04-17 DIAGNOSIS — Z86.69 HISTORY OF MIGRAINE HEADACHES: ICD-10-CM

## 2024-04-17 DIAGNOSIS — O09.523 MULTIGRAVIDA OF ADVANCED MATERNAL AGE IN THIRD TRIMESTER: ICD-10-CM

## 2024-04-17 DIAGNOSIS — Z67.91 RH NEGATIVE STATE IN ANTEPARTUM PERIOD: ICD-10-CM

## 2024-04-17 LAB
SL AMB  POCT GLUCOSE, UA: NEGATIVE
SL AMB POCT URINE PROTEIN: NORMAL

## 2024-04-17 PROCEDURE — PNV: Performed by: NURSE PRACTITIONER

## 2024-04-17 PROCEDURE — 81002 URINALYSIS NONAUTO W/O SCOPE: CPT | Performed by: NURSE PRACTITIONER

## 2024-04-17 NOTE — PROGRESS NOTES
Routine Prenatal Visit  [facility] - [place]  [address], [place] [address]    Assessment/Plan:  Meera is a 33 y.o. year old  at 38w2d who presents for routine prenatal visit.     1. 38 weeks gestation of pregnancy  -     POCT urine dip    2. Rh negative state in antepartum period    3. Multigravida of advanced maternal age in third trimester    4. History of migraine headaches      Reviewed s/s labor, [facility]  Next OB Visit 1 weeks.    Subjective:     CC: Prenatal care    Meera Harvey is a 33 y.o.  female who presents for routine prenatal care at 38w2d.  Feels well, some random contractions.   Pregnancy ROS: no leakage of fluid, pelvic pain, or vaginal bleeding.  normal fetal movement.    The following portions of the patient's history were reviewed and updated as appropriate: allergies, current medications, past family history, past medical history, obstetric history, gynecologic history, past social history, past surgical history and problem list.      Objective:  /60   Wt 87.4 kg (192 lb 9.6 oz)   LMP 2023 (Exact Date)   BMI 30.17 kg/m²   Pregravid Weight/BMI: 77.6 kg (171 lb) (BMI 26.78)  Current Weight: 87.4 kg (192 lb 9.6 oz)   Total Weight Gain: 9.798 kg (21 lb 9.6 oz)   Pre- Vitals      Flowsheet Row Most Recent Value   Prenatal Assessment    Fetal Heart Rate 138   Fundal Height (cm) 39 cm   Movement Present   Presentation Vertex   Prenatal Vitals    Blood Pressure 104/60   Weight - Scale 87.4 kg (192 lb 9.6 oz)   Urine Albumin/Glucose    Dilation/Effacement/Station    Cervical Dilation 1.5   Cervical Effacement 60   Fetal Station -1   Vaginal Drainage    Draining Fluid No   Edema    LLE Edema None   RLE Edema None   Facial Edema None             General: Well appearing, no distress  Abdomen: Soft, gravid, nontender  Extremities: Non tender.

## 2024-04-23 ENCOUNTER — ANESTHESIA EVENT (INPATIENT)
Dept: ANESTHESIOLOGY | Facility: HOSPITAL | Age: 34
End: 2024-04-23
Payer: COMMERCIAL

## 2024-04-23 ENCOUNTER — NURSE TRIAGE (OUTPATIENT)
Age: 34
End: 2024-04-23

## 2024-04-23 ENCOUNTER — ANESTHESIA (INPATIENT)
Dept: ANESTHESIOLOGY | Facility: HOSPITAL | Age: 34
End: 2024-04-23
Payer: COMMERCIAL

## 2024-04-23 ENCOUNTER — HOSPITAL ENCOUNTER (INPATIENT)
Facility: HOSPITAL | Age: 34
LOS: 2 days | Discharge: HOME | End: 2024-04-25
Attending: STUDENT IN AN ORGANIZED HEALTH CARE EDUCATION/TRAINING PROGRAM | Admitting: STUDENT IN AN ORGANIZED HEALTH CARE EDUCATION/TRAINING PROGRAM
Payer: COMMERCIAL

## 2024-04-23 DIAGNOSIS — Z3A.39 39 WEEKS GESTATION OF PREGNANCY: Primary | ICD-10-CM

## 2024-04-23 DIAGNOSIS — Z67.91 RH NEGATIVE STATE IN ANTEPARTUM PERIOD: ICD-10-CM

## 2024-04-23 DIAGNOSIS — O09.523 MULTIGRAVIDA OF ADVANCED MATERNAL AGE IN THIRD TRIMESTER: ICD-10-CM

## 2024-04-23 DIAGNOSIS — O26.899 RH NEGATIVE STATE IN ANTEPARTUM PERIOD: ICD-10-CM

## 2024-04-23 LAB
ABO GROUP BLD: NORMAL
BLD GP AB SCN SERPL QL: POSITIVE
BLOOD GROUP ANTIBODIES SERPL: NORMAL
ERYTHROCYTE [DISTWIDTH] IN BLOOD BY AUTOMATED COUNT: 13.3 % (ref 11.6–15.1)
HCT VFR BLD AUTO: 34.7 % (ref 34.8–46.1)
HGB BLD-MCNC: 11.4 G/DL (ref 11.5–15.4)
HOLD SPECIMEN: NORMAL
MCH RBC QN AUTO: 29.2 PG (ref 26.8–34.3)
MCHC RBC AUTO-ENTMCNC: 32.9 G/DL (ref 31.4–37.4)
MCV RBC AUTO: 89 FL (ref 82–98)
PLATELET # BLD AUTO: 219 THOUSANDS/UL (ref 149–390)
PMV BLD AUTO: 9.5 FL (ref 8.9–12.7)
RBC # BLD AUTO: 3.9 MILLION/UL (ref 3.81–5.12)
RH BLD: NEGATIVE
SPECIMEN EXPIRATION DATE: NORMAL
WBC # BLD AUTO: 8.45 THOUSAND/UL (ref 4.31–10.16)

## 2024-04-23 PROCEDURE — 4A1HXCZ MONITORING OF PRODUCTS OF CONCEPTION, CARDIAC RATE, EXTERNAL APPROACH: ICD-10-PCS | Performed by: STUDENT IN AN ORGANIZED HEALTH CARE EDUCATION/TRAINING PROGRAM

## 2024-04-23 PROCEDURE — 86901 BLOOD TYPING SEROLOGIC RH(D): CPT | Performed by: STUDENT IN AN ORGANIZED HEALTH CARE EDUCATION/TRAINING PROGRAM

## 2024-04-23 PROCEDURE — 86850 RBC ANTIBODY SCREEN: CPT | Performed by: STUDENT IN AN ORGANIZED HEALTH CARE EDUCATION/TRAINING PROGRAM

## 2024-04-23 PROCEDURE — NC001 PR NO CHARGE: Performed by: STUDENT IN AN ORGANIZED HEALTH CARE EDUCATION/TRAINING PROGRAM

## 2024-04-23 PROCEDURE — 86870 RBC ANTIBODY IDENTIFICATION: CPT | Performed by: STUDENT IN AN ORGANIZED HEALTH CARE EDUCATION/TRAINING PROGRAM

## 2024-04-23 PROCEDURE — 85027 COMPLETE CBC AUTOMATED: CPT | Performed by: STUDENT IN AN ORGANIZED HEALTH CARE EDUCATION/TRAINING PROGRAM

## 2024-04-23 PROCEDURE — 3E033VJ INTRODUCTION OF OTHER HORMONE INTO PERIPHERAL VEIN, PERCUTANEOUS APPROACH: ICD-10-PCS | Performed by: STUDENT IN AN ORGANIZED HEALTH CARE EDUCATION/TRAINING PROGRAM

## 2024-04-23 PROCEDURE — 99212 OFFICE O/P EST SF 10 MIN: CPT

## 2024-04-23 PROCEDURE — 86900 BLOOD TYPING SEROLOGIC ABO: CPT | Performed by: STUDENT IN AN ORGANIZED HEALTH CARE EDUCATION/TRAINING PROGRAM

## 2024-04-23 PROCEDURE — 10907ZC DRAINAGE OF AMNIOTIC FLUID, THERAPEUTIC FROM PRODUCTS OF CONCEPTION, VIA NATURAL OR ARTIFICIAL OPENING: ICD-10-PCS | Performed by: STUDENT IN AN ORGANIZED HEALTH CARE EDUCATION/TRAINING PROGRAM

## 2024-04-23 RX ORDER — LORATADINE 10 MG/1
10 TABLET ORAL DAILY
Status: DISCONTINUED | OUTPATIENT
Start: 2024-04-23 | End: 2024-04-25 | Stop reason: HOSPADM

## 2024-04-23 RX ORDER — SODIUM CHLORIDE, SODIUM LACTATE, POTASSIUM CHLORIDE, CALCIUM CHLORIDE 600; 310; 30; 20 MG/100ML; MG/100ML; MG/100ML; MG/100ML
125 INJECTION, SOLUTION INTRAVENOUS CONTINUOUS
Status: DISCONTINUED | OUTPATIENT
Start: 2024-04-23 | End: 2024-04-24

## 2024-04-23 RX ORDER — BUPIVACAINE HYDROCHLORIDE 2.5 MG/ML
INJECTION, SOLUTION EPIDURAL; INFILTRATION; INTRACAUDAL AS NEEDED
Status: DISCONTINUED | OUTPATIENT
Start: 2024-04-23 | End: 2024-04-24 | Stop reason: HOSPADM

## 2024-04-23 RX ORDER — OXYTOCIN/RINGER'S LACTATE 30/500 ML
1-30 PLASTIC BAG, INJECTION (ML) INTRAVENOUS
Status: DISCONTINUED | OUTPATIENT
Start: 2024-04-23 | End: 2024-04-24

## 2024-04-23 RX ORDER — ONDANSETRON 2 MG/ML
4 INJECTION INTRAMUSCULAR; INTRAVENOUS EVERY 6 HOURS PRN
Status: DISCONTINUED | OUTPATIENT
Start: 2024-04-23 | End: 2024-04-24

## 2024-04-23 RX ORDER — BUPIVACAINE HYDROCHLORIDE 2.5 MG/ML
30 INJECTION, SOLUTION EPIDURAL; INFILTRATION; INTRACAUDAL ONCE AS NEEDED
Status: DISCONTINUED | OUTPATIENT
Start: 2024-04-23 | End: 2024-04-24

## 2024-04-23 RX ORDER — ACETAMINOPHEN 325 MG/1
975 TABLET ORAL ONCE
Status: COMPLETED | OUTPATIENT
Start: 2024-04-23 | End: 2024-04-23

## 2024-04-23 RX ADMIN — ACETAMINOPHEN 975 MG: 325 TABLET, FILM COATED ORAL at 18:49

## 2024-04-23 RX ADMIN — SODIUM CHLORIDE, SODIUM LACTATE, POTASSIUM CHLORIDE, AND CALCIUM CHLORIDE 125 ML/HR: .6; .31; .03; .02 INJECTION, SOLUTION INTRAVENOUS at 15:59

## 2024-04-23 RX ADMIN — ROPIVACAINE HYDROCHLORIDE: 2 INJECTION, SOLUTION EPIDURAL; INFILTRATION at 19:15

## 2024-04-23 RX ADMIN — BUPIVACAINE HYDROCHLORIDE 1.2 ML: 2.5 INJECTION, SOLUTION EPIDURAL; INFILTRATION; INTRACAUDAL; PERINEURAL at 18:47

## 2024-04-23 RX ADMIN — SODIUM CHLORIDE, SODIUM LACTATE, POTASSIUM CHLORIDE, AND CALCIUM CHLORIDE 125 ML/HR: .6; .31; .03; .02 INJECTION, SOLUTION INTRAVENOUS at 18:49

## 2024-04-23 RX ADMIN — Medication 2 MILLI-UNITS/MIN: at 16:02

## 2024-04-23 RX ADMIN — LORATADINE 10 MG: 10 TABLET ORAL at 17:35

## 2024-04-23 NOTE — H&P
History & Physical - OB/GYN   Meera Harvey 34 y.o. female MRN: [id number]  Unit/Bed#:  215-01 Encounter: [id number]    Assessment:  34 y.o.  at 39w1d admitted for elective induction of labor    Plan:  1. Admit to L&D  2. Place IV, initiate IV fluids  3. Labs: CBC, RPR, type and screen  4. Labor management: start pitocin  5. Analgesia/Epidural PRN    Patient ruled out for rupture today. We discussed options, including expectant management versus scheduling induction of labor now that she has reached 39 weeks. Following review with L&D staff, patient was offered the option of staying for induction today, which she accepts. Induction consent reviewed on admission.    Rh negative state in antepartum period  - Plan for routine Rhogam workup postpartum.  _____________________    Chief complaint: Leaking fluid    She is a patient of [facility] - [place].    HPI: Meera Harvey is 34 y.o.  at 39w1d presenting for evaluation of possible membrane rupture. She reports a trickle of clear fluid a couple of hours before presenting to L&D. No large gush, but still a sensation of trickling.     Contractions:  no  Fetal movement:  yes  Vaginal bleeding:   no  Leaking of fluid:  See above      Pregnancy Complications:  G3 Problems (from 10/05/23 to present)       Problem Noted Resolved    Multigravida of advanced maternal age in third trimester 10/5/2023 by Fior Palm DO No    History of migraine headaches 10/5/2023 by Fior Palm DO No    Overview Signed 10/5/2023  2:20 PM by Fior Palm DO     Patient states she has headaches associated with seasonal allergies         39 weeks gestation of pregnancy 2023 by Heidi Pro MD No    Overview Signed 3/18/2024 10:16 AM by Alireza Riggins MD     TdaP 2024         Rh negative state in antepartum period 2022 by Luke Ruiz MD No    Overview Addendum 3/18/2024 10:02 AM by Alireza Riggins MD     Rhig - 23, 2024                    PMH:  Past Medical History:   Diagnosis Date    Abnormal Pap smear of cervix     just follow up [id number] abnormal since    Migraine     seasonal-food related-last episode 2023. No neuro work up. Resolve with tylenol/advil    UTI (urinary tract infection)     Last episode     Varicella     chicken pox as child       PSH:  No past surgical history on file.    Social Hx:  Social History     Tobacco Use    Smoking status: Never    Smokeless tobacco: Never   Vaping Use    Vaping status: Never Used   Substance Use Topics    Alcohol use: Not Currently    Drug use: Never        OB Hx:  OB History    Para Term  AB Living   3 1 1 0 1 1   SAB IAB Ectopic Multiple Live Births   0 1 0 0 1      # Outcome Date GA Lbr Jerrod/2nd Weight Sex Delivery Anes PTL Lv   3 Current            2 Term 22 40w1d / 01:03 3730 g (8 lb 3.6 oz) M Vag-Spont EPI N BETH      Birth Comments: seen by Dr Schreiber for meconium stained amniotic fluid      Name: ROSAMARIA,BABY BOY (ALEX)      Apgar1: 9  Apgar5: 9   1 IAB                Meds:  No current facility-administered medications on file prior to encounter.     Current Outpatient Medications on File Prior to Encounter   Medication Sig Dispense Refill    Prenatal MV-Min-Fe Fum-FA-DHA (PRENATAL 1 PO) Take 1 tablet by mouth         Allergies:  No Known Allergies    Labs:  ABO Grouping   Date Value Ref Range Status   2024 A  Final      Rh Factor   Date Value Ref Range Status   2024 Negative  Final      Rh Factor   Date Value Ref Range Status   2024 Negative  Final     HIV-1/HIV-2 Ab   Date Value Ref Range Status   2021 Non-Reactive Non-Reactive Final     Hepatitis B Surface Ag   Date Value Ref Range Status   10/16/2023 Non-reactive Non-Reactive Final     Hepatitis C Ab   Date Value Ref Range Status   10/16/2023 Non-reactive Non-Reactive Final     RPR   Date Value Ref Range Status   2022 Non-Reactive Non-Reactive Final     Rubella IgG  "Quant   Date Value Ref Range Status   10/16/2023 47.9 >14.9 IU/mL Final     Glucose   Date Value Ref Range Status   02/05/2024 67 40 - 134 mg/dL Final     Comment:     <=134 Normal   135-179 Impaired glucose fasting. Perform 3 Hour Glucose Tolerance   >=180 Diagnosis Gestational Diabetes Mellitus     No results found for: \"[id number]\"  Strep Grp B PCR   Date Value Ref Range Status   04/01/2024 Negative Negative Final     Comment:             Physical Exam:  /69 (BP Location: Right arm)   Pulse 76   Temp 97.5 °F (36.4 °C) (Oral)   Resp 18   Ht 5' 7\" (1.702 m)   Wt 87.4 kg (192 lb 9.6 oz)   LMP 07/24/2023 (Exact Date)   BMI 30.17 kg/m²     Gen: alert, no acute distress  Cardiac: regular rate  Resp: unlabored breathing  Abdomen: gravid, non-tender, non-distended  Extremities: non-tender, no edema    Estimated Fetal Weight: 7.5 lbs  Presentation: cephalic, confirmed via u/s    SVE: Cervical Dilation: 2-3  Cervical Effacement: 60  Cervical Consistency: Medium  Fetal Station: -2  Presentation: Vertex  Method: Manual  OB Examiner: Gilson    Negative pooling, negative Nitrazine, and negative fern test.   Pelvis assessed and felt to be adequate    FHT:  Baseline Rate (FHR): 125 bpm  Variability: Moderate  Accelerations: 15 x 15 or greater  Decelerations: None  FHR Category:  (Reactive, no decelerations)  TOCO:   Contraction Frequency (minutes): Irritability  Contraction Intensity: Mild    Membranes: Intact      Alireza Riggins MD  4/23/2024 2:00 PM      "

## 2024-04-23 NOTE — PROGRESS NOTES
04/23/24 1500   Oxytocin Safety Bundle   Oxytocin Initiation Type Induction   Indications for Induction Elective (>39 wks GA only)   Induction/Augmentation Consent Completed Yes   Estimated Fetal Weight 7.5 lbs   Baseline Rate (FHR) 125 bpm   Contraction Frequency (minutes) Irritability   Contraction Duration (seconds) 60   Contraction Intensity Mild   Tachysystole No   Pastor Score   Cervical Consistency 1   Cervical Dilation 2.5   Cervical Effacement 60   Cervical Position 2   Fetal Station -2   Pastor Score 7   Oxytocin Safety Bundle Completion   Oxytocin Safety Bundle complete? Yes   Safe to proceed? Yes     Alireza Riggins MD  4/23/2024 3:01 PM

## 2024-04-23 NOTE — OB LABOR/OXYTOCIN SAFETY PROGRESS
Oxytocin Safety Progress Check Note - Meera Harvey 34 y.o. female MRN: [id number]    Unit/Bed#: -01 Encounter: [id number]    Dose (cathy-units/min) Oxytocin: 8 cathy-units/min  Contraction Frequency (minutes): 4-5  Contraction Intensity: Mild  Uterine Activity Characteristics: Regular  Cervical Dilation: 3        Cervical Effacement: 60  Fetal Station: -2  Baseline Rate (FHR): 135 bpm     FHR Category: I           Vital Signs:   Vitals:    04/23/24 1733   BP: 113/70   Pulse: 74   Resp:    Temp:    SpO2:        Notes/comments:   - Fetal and maternal status reassuring. AROM performed on exam at 17:58; clear fluid noted. Continue pitocin titration. Analgesia PRN.       Alireza Riggins MD 4/23/2024 6:01 PM

## 2024-04-23 NOTE — ANESTHESIA PREPROCEDURE EVALUATION
Procedure:  LABOR ANALGESIA    Relevant Problems   ANESTHESIA  Prior labor epidural x1 - quick labor but no issues      CARDIO (within normal limits)      PULMONARY (within normal limits)      Neurology/Sleep   (+) History of migraine headaches (Active migraine HA)    BMI 30       Lab Results   Component Value Date    HGB 11.4 (L) 04/23/2024     04/23/2024     Anesthesia Plan  ASA Score- 2     Anesthesia Type- epidural with ASA Monitors.         Additional Monitors:     Airway Plan:            Plan Factors-Exercise tolerance (METS): >4 METS.    Chart reviewed.   Existing labs reviewed. Patient summary reviewed.    Patient is not a current smoker.              Induction- intravenous.    Postoperative Plan-     Informed Consent- Anesthetic plan and risks discussed with patient and spouse.  I personally reviewed this patient with the CRNA. Discussed and agreed on the Anesthesia Plan with the CRNA..

## 2024-04-23 NOTE — TELEPHONE ENCOUNTER
"Patient is 39w1d and .  She is reporting leakage of fluids about 10-15 minutes ago.  She confirms +Fm.  She denies any bleeding or contractions.  Advised patient to proceed to L&D unit to be further evaluated.       Tiger text sent to on call provider, Dr. Riggins and agrees with recommendations for patient to proceed to L&D unit to be further evaluated.      Tiger text sent to charge nurse on [facility] L&D unit to make aware of patient's arrival.           Reason for Disposition   Leakage of fluid from vagina    Answer Assessment - Initial Assessment Questions  1. ONSET: \"When did the symptoms begin?\"         10-15 minutes   2. CONTRACTIONS: \"Are you having any contractions?\" If yes, ask: \"Describe the contractions that you are having.\" (e.g., duration, frequency, regularity, severity)      Denies   3. SHANNAN: \"What date are you expecting to deliver?\"      24  4. PARITY: \"Have you had a baby before?\" If Yes, ask: \"How long did the labor last?\"        5. FETAL MOVEMENT: \"Has the baby's movement decreased or changed significantly from normal?\"      +FM   6. OTHER SYMPTOMS: \"Do you have any other symptoms?\" (e.g., abdominal pain, vaginal bleeding, fever, hand/facial swelling)      Denies    Protocols used: Pregnancy - Rupture of Membranes-ADULT-[place]    "

## 2024-04-23 NOTE — ANESTHESIA PROCEDURE NOTES
CSE Block    Patient location during procedure: OB  Start time: 4/23/2024 6:47 PM  Reason for block: procedure for pain and at surgeon's request  Staffing  Performed by: Mary Duran MD  Authorized by: Mary Duran MD    Preanesthetic Checklist  Completed: patient identified, IV checked, risks and benefits discussed, surgical consent, monitors and equipment checked, pre-op evaluation and timeout performed  CSE  Patient position: sitting  Prep: ChloraPrep  Patient monitoring: heart rate, continuous pulse ox and frequent blood pressure checks  Approach: midline  Spinal Needle  Needle type: pencil-tip   Needle gauge: 27 G  Epidural Needle  Injection technique: SADE saline  Needle type: Tuohy   Needle gauge: 17 G  Needle length: 9 cm  Needle insertion depth: 6 cm  Location: L3-L4  Catheter  Catheter type: side hole  Catheter size: 19 G  Catheter at skin depth: 11 cm  Catheter securement method: stabilization device  Assessment  Injection Assessment:  negative aspiration for heme, positive aspiration for clear CSF, no paresthesia on injection and no pain on injection.  Additional Notes  Pt positioned sitting, timeout, sterile prep and drape.  Placement X1 attempt at L 3/4 with SADE to NS.  Needle through needle CSE with + CSF, easy injection, + relief.  Cath threaded easily, negative aspiration.  Patient laid supine with MARCELINA, PCEA initiated.

## 2024-04-24 LAB
ABO GROUP BLD: NORMAL
BASE EXCESS BLDCOA CALC-SCNC: -3.8 MMOL/L (ref 3–11)
BASE EXCESS BLDCOV CALC-SCNC: -3.7 MMOL/L (ref 1–9)
BLD GP AB SCN SERPL QL: POSITIVE
FETAL CELL SCN BLD QL ROSETTE: NEGATIVE
HCO3 BLDCOA-SCNC: 22.4 MMOL/L (ref 17.3–27.3)
HCO3 BLDCOV-SCNC: 20.8 MMOL/L (ref 12.2–28.6)
O2 CT VFR BLDCOA CALC: 11.6 ML/DL
OXYHGB MFR BLDCOA: 56.1 %
OXYHGB MFR BLDCOV: 84.2 %
PCO2 BLDCOA: 44.8 MM[HG] (ref 30–60)
PCO2 BLDCOV: 36 MM HG (ref 27–43)
PH BLDCOA: 7.32 [PH] (ref 7.23–7.43)
PH BLDCOV: 7.38 [PH] (ref 7.19–7.49)
PO2 BLDCOA: 23.9 MM HG (ref 5–25)
PO2 BLDCOV: 38.9 MM HG (ref 15–45)
RH BLD: NEGATIVE
SAO2 % BLDCOV: 17.2 ML/DL

## 2024-04-24 PROCEDURE — 82805 BLOOD GASES W/O2 SATURATION: CPT | Performed by: STUDENT IN AN ORGANIZED HEALTH CARE EDUCATION/TRAINING PROGRAM

## 2024-04-24 PROCEDURE — 0HQ9XZZ REPAIR PERINEUM SKIN, EXTERNAL APPROACH: ICD-10-PCS | Performed by: STUDENT IN AN ORGANIZED HEALTH CARE EDUCATION/TRAINING PROGRAM

## 2024-04-24 PROCEDURE — 86900 BLOOD TYPING SEROLOGIC ABO: CPT | Performed by: STUDENT IN AN ORGANIZED HEALTH CARE EDUCATION/TRAINING PROGRAM

## 2024-04-24 PROCEDURE — 86850 RBC ANTIBODY SCREEN: CPT | Performed by: STUDENT IN AN ORGANIZED HEALTH CARE EDUCATION/TRAINING PROGRAM

## 2024-04-24 PROCEDURE — 85461 HEMOGLOBIN FETAL: CPT | Performed by: STUDENT IN AN ORGANIZED HEALTH CARE EDUCATION/TRAINING PROGRAM

## 2024-04-24 PROCEDURE — 86901 BLOOD TYPING SEROLOGIC RH(D): CPT | Performed by: STUDENT IN AN ORGANIZED HEALTH CARE EDUCATION/TRAINING PROGRAM

## 2024-04-24 PROCEDURE — 59400 OBSTETRICAL CARE: CPT | Performed by: STUDENT IN AN ORGANIZED HEALTH CARE EDUCATION/TRAINING PROGRAM

## 2024-04-24 RX ORDER — ONDANSETRON 2 MG/ML
4 INJECTION INTRAMUSCULAR; INTRAVENOUS EVERY 8 HOURS PRN
Status: DISCONTINUED | OUTPATIENT
Start: 2024-04-24 | End: 2024-04-25 | Stop reason: HOSPADM

## 2024-04-24 RX ORDER — ACETAMINOPHEN 325 MG/1
650 TABLET ORAL EVERY 4 HOURS PRN
Status: DISCONTINUED | OUTPATIENT
Start: 2024-04-24 | End: 2024-04-25 | Stop reason: HOSPADM

## 2024-04-24 RX ORDER — BENZOCAINE/MENTHOL 6 MG-10 MG
1 LOZENGE MUCOUS MEMBRANE DAILY PRN
Status: DISCONTINUED | OUTPATIENT
Start: 2024-04-24 | End: 2024-04-25 | Stop reason: HOSPADM

## 2024-04-24 RX ORDER — DIPHENHYDRAMINE HCL 25 MG
25 TABLET ORAL EVERY 6 HOURS PRN
Status: DISCONTINUED | OUTPATIENT
Start: 2024-04-24 | End: 2024-04-25 | Stop reason: HOSPADM

## 2024-04-24 RX ORDER — IBUPROFEN 600 MG/1
600 TABLET ORAL EVERY 6 HOURS PRN
Status: DISCONTINUED | OUTPATIENT
Start: 2024-04-24 | End: 2024-04-25 | Stop reason: HOSPADM

## 2024-04-24 RX ORDER — CALCIUM CARBONATE 500 MG/1
1000 TABLET, CHEWABLE ORAL DAILY PRN
Status: DISCONTINUED | OUTPATIENT
Start: 2024-04-24 | End: 2024-04-25 | Stop reason: HOSPADM

## 2024-04-24 RX ORDER — DOCUSATE SODIUM 100 MG/1
100 CAPSULE, LIQUID FILLED ORAL 2 TIMES DAILY
Status: DISCONTINUED | OUTPATIENT
Start: 2024-04-24 | End: 2024-04-25 | Stop reason: HOSPADM

## 2024-04-24 RX ADMIN — ACETAMINOPHEN 650 MG: 325 TABLET, FILM COATED ORAL at 19:54

## 2024-04-24 RX ADMIN — WITCH HAZEL 1 PAD: 500 SOLUTION RECTAL; TOPICAL at 09:45

## 2024-04-24 RX ADMIN — ROPIVACAINE HYDROCHLORIDE: 2 INJECTION, SOLUTION EPIDURAL; INFILTRATION at 04:09

## 2024-04-24 RX ADMIN — IBUPROFEN 600 MG: 600 TABLET, FILM COATED ORAL at 15:35

## 2024-04-24 RX ADMIN — LORATADINE 10 MG: 10 TABLET ORAL at 09:48

## 2024-04-24 RX ADMIN — IBUPROFEN 600 MG: 600 TABLET, FILM COATED ORAL at 22:57

## 2024-04-24 RX ADMIN — BENZOCAINE AND LEVOMENTHOL 1 APPLICATION: 200; 5 SPRAY TOPICAL at 09:44

## 2024-04-24 RX ADMIN — DOCUSATE SODIUM 100 MG: 100 CAPSULE, LIQUID FILLED ORAL at 08:36

## 2024-04-24 NOTE — OB LABOR/OXYTOCIN SAFETY PROGRESS
Oxytocin Safety Progress Check Note - Meera Harvey 34 y.o. female MRN: [id number]    Unit/Bed#: -01 Encounter: [id number]    Dose (cathy-units/min) Oxytocin: 12 cathy-units/min  Contraction Frequency (minutes): 2-4.5  Contraction Intensity: Mild/Moderate  Uterine Activity Characteristics: Regular  Cervical Dilation: 3        Cervical Effacement: 60  Fetal Station: -2  Baseline Rate (FHR): 120 bpm     FHR Category: 1           Vital Signs:   Vitals:    04/23/24 2155   BP: 122/77   Pulse: 66   Resp:    Temp:    SpO2:        Notes/comments:   SVE deferred. Fetal status reassuring. Continue pitocin.       Ludy Vargas MD 4/23/2024 10:14 PM

## 2024-04-24 NOTE — OB LABOR/OXYTOCIN SAFETY PROGRESS
Oxytocin Safety Progress Check Note - Meera Nelsy Harvey 34 y.o. female MRN: [id number]    Unit/Bed#: -01 Encounter: [id number]    Dose (cathy-units/min) Oxytocin: 12 cahty-units/min  Contraction Frequency (minutes): 2-4  Contraction Intensity: Mild/Moderate  Uterine Activity Characteristics: Regular  Cervical Dilation: 4        Cervical Effacement: 70  Fetal Station: -2  Baseline Rate (FHR): 120 bpm     FHR Category: 1           Vital Signs:   Vitals:    04/23/24 2356   BP: 115/68   Pulse: 58   Resp:    Temp:    SpO2:        Notes/comments:   Making change. Continue increasing pitocin.       Ludy Vargas MD 4/24/2024 12:57 AM

## 2024-04-24 NOTE — OB LABOR/OXYTOCIN SAFETY PROGRESS
Oxytocin Safety Progress Check Note - Meera Harvey 34 y.o. female MRN: [id number]    Unit/Bed#: -01 Encounter: [id number]    Dose (cathy-units/min) Oxytocin: 20 cathy-units/min  Contraction Frequency (minutes): 2-4  Contraction Intensity: Moderate  Uterine Activity Characteristics: Regular  Cervical Dilation: 6        Cervical Effacement: 80  Fetal Station: -1  Baseline Rate (FHR): 125 bpm     FHR Category: I           Vital Signs:   Vitals:    04/24/24 0226   BP: 94/55   Pulse: 61   Resp:    Temp:    SpO2:        Notes/comments:   - Fetal and maternal status reassuring. First exam in active phase of labor. Continue pitocin titration.       Alireza Riggins MD 4/24/2024 2:58 AM

## 2024-04-24 NOTE — OB LABOR/OXYTOCIN SAFETY PROGRESS
Oxytocin Safety Progress Check Note - Meera Harvey 34 y.o. female MRN: [id number]    Unit/Bed#: -01 Encounter: [id number]    Dose (cathy-units/min) Oxytocin: 20 cathy-units/min  Contraction Frequency (minutes): 2-4  Contraction Intensity: Strong  Uterine Activity Characteristics: Regular  Cervical Dilation: 8-9        Cervical Effacement: 90  Fetal Station: 0  Baseline Rate (FHR): 130 bpm     FHR Category: I           Vital Signs:   Vitals:    24 0456   BP: 136/83   Pulse: 73   Resp:    Temp:    SpO2:        Notes/comments:   - Patient progressing appropriately in the active phase of labor. Continue pitocin titration. Anticipate  shortly.       Alireza Riggins MD 2024 5:09 AM

## 2024-04-24 NOTE — LACTATION NOTE
This note was copied from a baby's chart.  CONSULT - LACTATION  Baby Boy Harvey (Madelyn) 0 days male MRN: [id number]    [facility] NURSERY Room / Bed: (N)/(N) Encounter: [id number]    Maternal Information     MOTHER:  Meera Harvey  Maternal Age: 34 y.o.   OB History: # 1 - Date: , Sex: None, Weight: None, GA: None, Delivery: None, Apgar1: None, Apgar5: None, Living: None, Birth Comments: None    # 2 - Date: 22, Sex: Male, Weight: 3730 g (8 lb 3.6 oz), GA: 40w1d, Delivery: Vaginal, Spontaneous, Apgar1: 9, Apgar5: 9, Living: Living, Birth Comments: seen by Dr Schreiber for meconium stained amniotic fluid    # 3 - Date: 24, Sex: Male, Weight: 3910 g (8 lb 9.9 oz), GA: 39w2d, Delivery: Vaginal, Spontaneous, Apgar1: 8, Apgar5: 9, Living: Living, Birth Comments: None   Previouse breast reduction surgery? No    Lactation history:   Has patient previously breast fed: Yes   How long had patient previously breast fed: 1 year with first child   Previous breast feeding complications:     History reviewed. No pertinent surgical history.     Birth information:  YOB: 2025   Time of birth: 5:36 AM   Sex: male   Delivery type: Vaginal, Spontaneous   Birth Weight: 3910 g (8 lb 9.9 oz)   Percent of Weight Change: 0%     Gestational Age: 39w2d   [unfilled]    Assessment     Breast and nipple assessment: large breast     Assessment: normal assessment    Feeding assessment: feeding well  LATCH:  Latch: Grasps breast, tongue down, lips flanged, rhythmic sucking   Audible Swallowing: Spontaneous and intermittent (24 hours old)   Type of Nipple: Everted (After stimulation)   Comfort (Breast/Nipple): Soft/non-tender   Hold (Positioning): Partial assist, teach one side, mother does other, staff holds   LATCH Score: 9          Feeding recommendations:  breast feed on demand    Met with mother to discuss feeding plan. Mother discussed that baby  "breast fed well after birth and is planning on continuing to breastfeed her baby.     The Ready, Set, Baby Booklet was discussed. Discussed importance of skin to skin to help baby awaken for breastfeeding, to help with milk production as well as stabilize temperature, blood sugars, decrease pain, promote relaxation, and calm the baby as well as for bonding that father may do as well. Discussed tummy size progression as milk production increases to meet the nutritional/growing needs of the baby and risks associated with introducing early supplementation that is not medically indicated. Discussed alternative feeding methods as a manner to provide baby with additional colostrum/breast milk if baby is sleepy and/or unable to breastfeed directly to help protect the milk supply and preserve latching abilities at the breast. Mother was encouraged to hand express after feedings to provide \"dessert\" and when sleepy to hand express to provide \"snacks\" which could help baby to awaken for a feeding.    Discussed “Second Night Syndrome” explaining how baby’s cluster feeds to meet growing needs. Growth spurts periods were discussed within the first year and how cluster feeding helps boost milk supply.    Explained feeding cues and fullness cues as well as importance of obtaining a deep latch for effective milk removal and proper positioning (tummy to tummy, at level, nose to nipple, bring chin to breast first and bringing baby to breast) with ear, shoulder, and hip alignment. Demonstrated how to hold, compress and perform hand expression. Mother was able to express and baby latched in a cross cradle then mother was assisted into laid back feeling more comfortable.    Addressed breast pump needs and mother discussed has a double breast pump for home use.    Mother was made aware of how to communicate with lactation and encouraged to reach out for continued support and/or questions that arise.    Willow Elliott RN 4/24/2024 11:37 " AM

## 2024-04-24 NOTE — L&D DELIVERY NOTE
DELIVERY NOTE  Meera Harvey 34 y.o. female MRN: [id number]  Unit/Bed#: -01 Encounter: [id number]    Obstetrician:  Alireza Riggins MD    Pre-Delivery Diagnosis: [place]bol[place]o pregnancy in vertex presentation at 39w2d gestation.    Post-Delivery Diagnosis: Same, delivered    Procedure: Spontaneous vaginal delivery    Delivery Date and Time:  4/24/2024 at 5:36 am    Umbilical Artery  Recent Labs     04/24/24  0538   PHCART 7.317   BECART -3.8*       Umbilical Vein  Recent Labs     04/24/24  0538   PHCVEN 7.379   BECVEN -3.7*       Apgars: 8 at 1 minute, 9 at 5 minutes    Weight: Pending           Complications: None    Description of Procedure:  Patient was found to be completely dilated and +1 station. She pushed for 6 minutes to spontaneously deliver a liveborn infant in left occiput anterior position through clear fluid at 05:36. The head and shoulders delivered easily, with the body easily delivering thereafter. The infant was placed on maternal abdomen. Cord clamping was delayed for 30 seconds, after which the cord was doubly clamped and cut. Routine cord gases and cord blood were collected. Pitocin was started for active management of the 3rd stage. Placenta delivered spontaneously and was noted to have a centrally-inserted 3-vessel cord. The placenta was sent for storage. The fundus was noted to be firm. A thorough pelvic exam revealed a 1st degree laceration, which was repaired with 3-0 polyglactin suture in typical fashion. Excellent hemostasis was noted, with total QBL of 261 mL. All needle, sponge, and instrument counts were noted to be correct. The patient tolerated the procedure well and was allowed to recover in labor and delivery room.     Alireza Riggins MD  4/24/2024 6:03 AM

## 2024-04-24 NOTE — PLAN OF CARE
Problem: Knowledge Deficit  Goal: Verbalizes understanding of labor plan  Description: Assess patient/family/caregiver's baseline knowledge level and ability to understand information.  Provide education via patient/family/caregiver's preferred learning method at appropriate level of understanding.     1. Provide teaching at level of understanding.  2. Provide teaching via preferred learning method(s).  Outcome: Completed     Problem: Labor & Delivery  Goal: Manages discomfort  Description: Assess and monitor for signs and symptoms of discomfort.  Assess patient's pain level regularly and per hospital policy.  Administer medications as ordered. Support use of nonpharmacological methods to help control pain such as distraction, imagery, relaxation, and application of heat and cold.  Collaborate with interdisciplinary team and patient to determine appropriate pain management plan.    1. Include patient in decisions related to comfort.  2. Offer non-pharmacological pain management interventions.  3. Report ineffective pain management to physician.  Outcome: Completed  Goal: Patient vital signs are stable  Description: 1. Assess vital signs - vaginal delivery.  Outcome: Completed

## 2024-04-24 NOTE — ANESTHESIA POSTPROCEDURE EVALUATION
Post-Op Assessment Note    CV Status:  Stable    Pain management: adequate       Mental Status:  Alert and awake   Hydration Status:  Euvolemic   PONV Controlled:  Controlled   Airway Patency:  Patent     Post Op Vitals Reviewed: Yes    No anethesia notable event occurred.    Staff: Anesthesiologist         Vitals:    04/24/24 0630   BP: 123/68   Pulse: 67   Resp: 18   Temp: 98 °F (36.7 °C)   SpO2:

## 2024-04-24 NOTE — OB LABOR/OXYTOCIN SAFETY PROGRESS
Oxytocin Safety Progress Check Note - Meera Harvey 34 y.o. female MRN: [id number]    Unit/Bed#: -01 Encounter: [id number]    Dose (cathy-units/min) Oxytocin: 8 cathy-units/min  Contraction Frequency (minutes): 2-3  Contraction Intensity: Mild/Moderate  Uterine Activity Characteristics: Regular  Cervical Dilation: 3        Cervical Effacement: 60  Fetal Station: -2  Baseline Rate (FHR): 130 bpm     FHR Category: I               Vital Signs:   Vitals:    04/23/24 2000   BP: 119/73   Pulse: 70   Resp:    Temp:    SpO2: 98%       Notes/comments:   - Patient comfortable following placement of epidural. Cervix unchanged from prior. Fetal and maternal status reassuring. Continue pitocin titration.       Alireza Riggins MD 4/23/2024 8:14 PM

## 2024-04-25 VITALS
OXYGEN SATURATION: 98 % | WEIGHT: 192.6 LBS | SYSTOLIC BLOOD PRESSURE: 131 MMHG | DIASTOLIC BLOOD PRESSURE: 79 MMHG | HEIGHT: 67 IN | HEART RATE: 75 BPM | BODY MASS INDEX: 30.23 KG/M2 | TEMPERATURE: 98.1 F | RESPIRATION RATE: 18 BRPM

## 2024-04-25 LAB
ERYTHROCYTE [DISTWIDTH] IN BLOOD BY AUTOMATED COUNT: 13.4 % (ref 11.6–15.1)
HCT VFR BLD AUTO: 35.7 % (ref 34.8–46.1)
HGB BLD-MCNC: 11.6 G/DL (ref 11.5–15.4)
MCH RBC QN AUTO: 29.4 PG (ref 26.8–34.3)
MCHC RBC AUTO-ENTMCNC: 32.5 G/DL (ref 31.4–37.4)
MCV RBC AUTO: 91 FL (ref 82–98)
PLATELET # BLD AUTO: 206 THOUSANDS/UL (ref 149–390)
PMV BLD AUTO: 9.5 FL (ref 8.9–12.7)
RBC # BLD AUTO: 3.94 MILLION/UL (ref 3.81–5.12)
WBC # BLD AUTO: 10.28 THOUSAND/UL (ref 4.31–10.16)

## 2024-04-25 PROCEDURE — 85027 COMPLETE CBC AUTOMATED: CPT | Performed by: STUDENT IN AN ORGANIZED HEALTH CARE EDUCATION/TRAINING PROGRAM

## 2024-04-25 PROCEDURE — 99024 POSTOP FOLLOW-UP VISIT: CPT | Performed by: OBSTETRICS & GYNECOLOGY

## 2024-04-25 PROCEDURE — NC001 PR NO CHARGE: Performed by: OBSTETRICS & GYNECOLOGY

## 2024-04-25 RX ORDER — IBUPROFEN 600 MG/1
600 TABLET ORAL EVERY 6 HOURS PRN
Start: 2024-04-25

## 2024-04-25 RX ORDER — ACETAMINOPHEN 325 MG/1
650 TABLET ORAL EVERY 4 HOURS PRN
Start: 2024-04-25

## 2024-04-25 RX ADMIN — LORATADINE 10 MG: 10 TABLET ORAL at 08:00

## 2024-04-25 RX ADMIN — DOCUSATE SODIUM 100 MG: 100 CAPSULE, LIQUID FILLED ORAL at 08:00

## 2024-04-25 RX ADMIN — IBUPROFEN 600 MG: 600 TABLET, FILM COATED ORAL at 08:00

## 2024-04-25 NOTE — LACTATION NOTE
This note was copied from a baby's chart.  Met with mother to follow up and discuss the Breastfeeding Discharge Booklet.     Baby is currently at a 3.2% weight loss, having appropriate output and 24 hour bilirubin was low.    Discussed the importance of ensuring that baby feeds 8-12x in 24 hours and that baby has 6 wet diapers or more that are becoming more dilute as well as soiled diapers that are transitioning demonstrated by color change from meconium to a yellow/gold seedy loose stool by day 5.    Mother was given resources to look up medications to ensure they are safe with breastfeeding, by communicating with the [facility], [facility], [facility] as well as using [employer] (assisted mother to pin to home screen on personal phone).    Mother is aware of  engorgement time frame (when mature milk comes in) and management as well as how to deal with conditions that may occur while breastfeeding (plugged ducts, milk blebs and mastitis) and when is appropriate to communicate with her OB/GYN and/or a lactation consultant.    Mother is comfortable with how to set up a pump, how to cycle (stimulation vs expression phases during a pumping session), importance of flange fit and trying different sizes to ensure best fit, milk storage and how to properly clean parts. Discussed handouts for tips on pumping when returning to work and paced bottle feeding.    Discussed community resources for continued support in breastfeeding once discharged home.     Mother was encouraged to call for further questions that arise prior to discharge.

## 2024-04-25 NOTE — PROGRESS NOTES
"Progress Note - OB/GYN  Post-Partum Physician Note   Meera Whittington Wes 34 y.o. female MRN: [id number]  Unit/Bed#:   Encounter: [id number]    Assessment:  34 y.o. year-old , postpartum day #1 s/p     Plan:   Continue routine postpartum care  Encourage ambulation  Advance diet as tolerated    Rh negative state in antepartum period  - Plan for routine Rhogam workup postpartum.    _________________________________    Subjective:   Pain: -  Tolerating Oral Intake: +  Voiding: +  Ambulating: +  Breastfeeding: +  Chest Pain: -  Shortness of Breath: -  Lochia: minimal    Objective:   Vitals:    24 1918 24 2231 24 0249 24 0809   BP: 136/67 118/70 134/97 115/79   BP Location: Right arm Right arm Right arm Left arm   Pulse: 66 61 69 70   Resp: 18 18 18 18   Temp: 98.4 °F (36.9 °C) 97.8 °F (36.6 °C) 98.2 °F (36.8 °C) 97.9 °F (36.6 °C)   TempSrc: Oral Oral Temporal Temporal   SpO2: 95% 96% 98% 98%   Weight:       Height:           Intake/Output Summary (Last 24 hours) at 2024 1023  Last data filed at 2024 1036  Gross per 24 hour   Intake --   Output 600 ml   Net -600 ml       Physical Exam:  General: in no apparent distress  Abdomen: abdomen is soft without significant tenderness, masses, organomegaly or guarding  Fundus: non-tender    Labs/Tests:   Lab Results   Component Value Date/Time    HGB 11.6 2024 06:20 AM    HGB 11.4 (L) 2024 03:22 PM     2024 06:20 AM     2024 03:22 PM    WBC 10.28 (H) 2024 06:20 AM    WBC 8.45 2024 03:22 PM        Brief OB Lab review:  ABO Grouping   Date Value Ref Range Status   2024 A  Final      Rh Factor   Date Value Ref Range Status   2024 Negative  Final    No results found for: \"ANTIBODYSCR\"  No results found for: \"RUBM\"    MEDS:   Current Facility-Administered Medications   Medication Dose Route Frequency    acetaminophen (TYLENOL) tablet 650 mg  650 mg Oral Q4H PRN    " benzocaine-menthol-lanolin-aloe (DERMOPLAST) 20-0.5 % topical spray 1 Application  1 Application Topical Q6H PRN    calcium carbonate (TUMS) chewable tablet 1,000 mg  1,000 mg Oral Daily PRN    diphenhydrAMINE (BENADRYL) tablet 25 mg  25 mg Oral Q6H PRN    docusate sodium (COLACE) capsule 100 mg  100 mg Oral BID    hydrocortisone 1 % cream 1 Application  1 Application Topical Daily PRN    ibuprofen (MOTRIN) tablet 600 mg  600 mg Oral Q6H PRN    loratadine (CLARITIN) tablet 10 mg  10 mg Oral Daily    ondansetron (ZOFRAN) injection 4 mg  4 mg Intravenous Q8H PRN    Rho(D) immune globulin (RHOGAM ULTRA-FILTERED PLUS) IM injection 300 mcg  300 mcg Intramuscular Once    witch hazel-glycerin (TUCKS) topical pad 1 Pad  1 Pad Topical Q4H PRN     Invasive Devices       Peripheral Intravenous Line  Duration             Peripheral IV 04/23/24 Dorsal (posterior);Left Hand 1 day                      Miguelito Concepcion DO  4/25/2024 10:23 AM

## 2024-04-25 NOTE — PLAN OF CARE
Problem: PAIN - ADULT  Goal: Verbalizes/displays adequate comfort level or baseline comfort level  Description: Interventions:  - Encourage patient to monitor pain and request assistance  - Assess pain using appropriate pain scale  - Administer analgesics based on type and severity of pain and evaluate response  - Implement non-pharmacological measures as appropriate and evaluate response  - Consider cultural and social influences on pain and pain management  - Notify physician/advanced practitioner if interventions unsuccessful or patient reports new pain  Outcome: Adequate for Discharge     Problem: INFECTION - ADULT  Goal: Absence or prevention of progression during hospitalization  Description: INTERVENTIONS:  - Assess and monitor for signs and symptoms of infection  - Monitor lab/diagnostic results  - Monitor all insertion sites, i.e. indwelling lines, tubes, and drains  - Monitor endotracheal if appropriate and nasal secretions for changes in amount and color  - Parks appropriate cooling/warming therapies per order  - Administer medications as ordered  - Instruct and encourage patient and family to use good hand hygiene technique  - Identify and instruct in appropriate isolation precautions for identified infection/condition  Outcome: Adequate for Discharge  Goal: Absence of fever/infection during neutropenic period  Description: INTERVENTIONS:  - Monitor WBC    Outcome: Adequate for Discharge     Problem: SAFETY ADULT  Goal: Patient will remain free of falls  Description: INTERVENTIONS:  - Educate patient/family on patient safety including physical limitations  - Instruct patient to call for assistance with activity   - Consult OT/PT to assist with strengthening/mobility   - Keep Call bell within reach  - Keep bed low and locked with side rails adjusted as appropriate  - Keep care items and personal belongings within reach  - Initiate and maintain comfort rounds  - Make Fall Risk Sign visible to  staff  - Offer Toileting every  Hours, in advance of need  - Initiate/Maintain alarm  - Obtain necessary fall risk management equipment:   - Apply yellow socks and bracelet for high fall risk patients  - Consider moving patient to room near nurses station  Outcome: Adequate for Discharge  Goal: Maintain or return to baseline ADL function  Description: INTERVENTIONS:  -  Assess patient's ability to carry out ADLs; assess patient's baseline for ADL function and identify physical deficits which impact ability to perform ADLs (bathing, care of mouth/teeth, toileting, grooming, dressing, etc.)  - Assess/evaluate cause of self-care deficits   - Assess range of motion  - Assess patient's mobility; develop plan if impaired  - Assess patient's need for assistive devices and provide as appropriate  - Encourage maximum independence but intervene and supervise when necessary  - Involve family in performance of ADLs  - Assess for home care needs following discharge   - Consider OT consult to assist with ADL evaluation and planning for discharge  - Provide patient education as appropriate  Outcome: Adequate for Discharge  Goal: Maintains/Returns to pre admission functional level  Description: INTERVENTIONS:  - Perform AM-PAC 6 Click Basic Mobility/ Daily Activity assessment daily.  - Set and communicate daily mobility goal to care team and patient/family/caregiver.   - Collaborate with rehabilitation services on mobility goals if consulted  - Perform Range of Motion  times a day.  - Reposition patient every  hours.  - Dangle patient  times a day  - Stand patient  times a day  - Ambulate patient  times a day  - Out of bed to chair  times a day   - Out of bed for meals times a day  - Out of bed for toileting  - Record patient progress and toleration of activity level   Outcome: Adequate for Discharge     Problem: Knowledge Deficit  Goal: Patient/family/caregiver demonstrates understanding of disease process, treatment plan,  medications, and discharge instructions  Description: Complete learning assessment and assess knowledge base.  Interventions:  - Provide teaching at level of understanding  - Provide teaching via preferred learning methods  Outcome: Adequate for Discharge     Problem: DISCHARGE PLANNING  Goal: Discharge to home or other facility with appropriate resources  Description: INTERVENTIONS:  - Identify barriers to discharge w/patient and caregiver  - Arrange for needed discharge resources and transportation as appropriate  - Identify discharge learning needs (meds, wound care, etc.)  - Arrange for interpretive services to assist at discharge as needed  - Refer to Case Management Department for coordinating discharge planning if the patient needs post-hospital services based on physician/advanced practitioner order or complex needs related to functional status, cognitive ability, or social support system  Outcome: Adequate for Discharge

## 2024-04-25 NOTE — DISCHARGE SUMMARY
Discharge Summary - OB/GYN   Meera Harvey 34 y.o. female MRN: [id number]  Unit/Bed#:  - Encounter: [id number]    Admission Date: 2024     Discharge Date: 24    Principal Diagnosis: Pregnancy [Z34.90],  Pregnancy at 39w2d    Secondary Diagnosis: AMA    Attending - Delivery: Alireza Chavez MD         - Discharge: Shil V Jamey, DO    Procedures: Vaginal, Spontaneous , 1st degree laceration    Anesthesia: epidural    Complications: none apparent    Hospital Course:      Meera Harvey is a 34 y.o.  at 39w2d who was admitted with induction of labor for Elective reasons. She progressed on pitocin and had an uncomplicated delivery.     She delivered a viable  with weight of 3910 grams, apgars of 8 (1 min) and 9 (5 min).     was transferred to  nursery. Patient tolerated the procedure well and was transferred to recovery in stable condition.      Her postpartum course was uncomplicated. Admission hemoglobin was 11.4, postpartum was 11.6.      On day of discharge, she was ambulating and able to reasonably perform all ADLs. She was voiding and had appropriate bowel function. Pain was well controlled. She was discharged home on postpartum day #1 without complications. Patient was instructed to follow up with her OB as an outpatient and was given appropriate warnings to call provider if she develops signs of infection or uncontrolled pain.    Condition at discharge: good     Physical Exam at discharge:  Vitals:    24 1918 24 2231 24 0249 24 0809   BP: 136/67 118/70 134/97 115/79   BP Location: Right arm Right arm Right arm Left arm   Pulse: 66 61 69 70   Resp: 18 18 18 18   Temp: 98.4 °F (36.9 °C) 97.8 °F (36.6 °C) 98.2 °F (36.8 °C) 97.9 °F (36.6 °C)   TempSrc: Oral Oral Temporal Temporal   SpO2: 95% 96% 98% 98%   Weight:       Height:         General: in no apparent distress  Abdomen: abdomen is soft without  significant tenderness, masses, organomegaly or guarding  Fundus: Firm, 1 below the umbilicus  Lower extremeties: nontender    Discharge instructions/Information to patient and family:   See after visit summary for information provided to patient and family.      Provisions for Follow-Up Care:  See after visit summary for information related to follow-up care and any pertinent home health orders.      Disposition: See After Visit Summary for discharge disposition information.    Planned Readmission: No    Discharge Medications:  For a complete list of the patient's medications, please refer to her medication reconciliation.    Discharge Statement   I spent 20 minutes discharging the patient. This time was spent on the day of discharge. I had direct contact with the patient on the day of discharge. Additional documentation is required if more than 30 minutes were spent on discharge.     Miguelito Concepcion DO  4/25/2024 10:26 AM

## 2024-04-29 ENCOUNTER — TELEPHONE (OUTPATIENT)
Dept: OBGYN CLINIC | Facility: CLINIC | Age: 34
End: 2024-04-29

## 2024-04-29 ENCOUNTER — OFFICE VISIT (OUTPATIENT)
Dept: OBGYN CLINIC | Facility: CLINIC | Age: 34
End: 2024-04-29
Payer: COMMERCIAL

## 2024-04-29 VITALS
HEIGHT: 67 IN | SYSTOLIC BLOOD PRESSURE: 112 MMHG | BODY MASS INDEX: 29.35 KG/M2 | DIASTOLIC BLOOD PRESSURE: 80 MMHG | WEIGHT: 187 LBS

## 2024-04-29 DIAGNOSIS — O26.899 RH NEGATIVE STATE IN ANTEPARTUM PERIOD: Primary | ICD-10-CM

## 2024-04-29 DIAGNOSIS — Z67.91 RH NEGATIVE STATE IN ANTEPARTUM PERIOD: Primary | ICD-10-CM

## 2024-04-29 PROCEDURE — 96372 THER/PROPH/DIAG INJ SC/IM: CPT

## 2024-04-29 NOTE — TELEPHONE ENCOUNTER
Recalled patient & left another voicemail message to follow up if patient can come to office today for rhogam injection.  Also sent [employer] message to patient.

## 2024-04-29 NOTE — TELEPHONE ENCOUNTER
POSTPARTUM PHONE CALL ASSESSMENT    Date of Delivery: 2024  Delivering Provider: Dr Alireza Riggins  Mode:    Delivery Notes/Complications: EIOL - 1st degree perineal lac   Do you still have bleeding/pain? If so, how much/how severe? Light- mod flow, more brown   Regular BMs/Urination? Normal bowel & bladder habits, taking Colace BID  Breastfeeding/Formula/Both? Breastfeeding q 2-3 hrs, sometimes cluster feeding  Patient dod not need  How are you doing emotionally? Patient states she is doing well, 2 yr old son doing well with new baby   EPDS Score: 0  Do you have any other questions or concerns for us or your provider? no  Have you scheduled the pediatrician appointment with pediatrician? [facility] - had appointment 2024 - weight recheck today  Do you have a postpartum visit scheduled? No - scheduled today   Date scheduled:  Provider:      Spoke with Flaca @  L&D [phone] re: if patient received Rhogam postpartum - no documentation of administration per her investigation into chart - she will update nursing supervisor (Mary Bennett ) to file incident report & also notify risk mgt.  Tiger text to Dr Alireza Riggins - he recommends for patient to receive Rhogam inj 300 ug in office today.  Left message patient's answering service (tentatively scheduled appointment for patient for 4:00 pm today) but patient can come in earlier in afternoon if convenient.  Patient had received Rhogam early pregnancy & @ 30 wk (2024).

## 2024-05-03 LAB — PLACENTA IN STORAGE: NORMAL

## 2024-05-05 ENCOUNTER — NURSE TRIAGE (OUTPATIENT)
Dept: OTHER | Facility: OTHER | Age: 34
End: 2024-05-05

## 2024-05-05 DIAGNOSIS — N61.0 MASTITIS: Primary | ICD-10-CM

## 2024-05-05 NOTE — TELEPHONE ENCOUNTER
"Reason for Disposition  • [1] Breast looks infected (e.g., spreading redness, feels hot or painful to touch) AND [2] fever > 100.4 F (38.0 C)    Answer Assessment - Initial Assessment Questions  1. PAIN: \"How bad is the pain?\"  (Scale 1-10; or mild, moderate, severe)  6/10- moderate    2. SKIN: \"Does the skin appear red?\"        yes    3. LOCATION: \"Which breast?\" (e.g., left, right, both)      Left    4. DELIVERY: \"When was the baby born?\"       4/24    5. BREASTFEEDING: \"Have you been breastfeeding?\" If yes, ask: \"When did you stop?\"   Yes    6. ONSET: \"When did the breast pain start?\" (e.g., hours, days)      5/5 at 2am    7. FEVER: \"Do you have a fever?\" If Yes, ask: \"What is it, how was it measured, and when did it start?\"    Yes 101 temp     8. OTHER SYMPTOMS: \"Do you have any other symptoms?\" (e.g., abdominal pain, feeling sad or depressed, weakness)      Chills, sensitivity when nursing, and  headache. Breast warm to touch and red    Took tylenol, Sunflower lecitin, and tried warm compresses    Protocols used: Postpartum - Breast Pain and Engorgement-ADULT-AH      Paged on call provider. Provider stated possible mastitis and she will send antibiotic. Safe with nursing. Continue breast feeding/pumping to empty breasts. Motrin and Tylenolfor symptoms. Call office for exam if not improved in 2 days. Informed patient and she verbalized understanding.   "

## 2024-05-14 ENCOUNTER — POSTPARTUM VISIT (OUTPATIENT)
Dept: OBGYN CLINIC | Facility: CLINIC | Age: 34
End: 2024-05-14

## 2024-05-14 VITALS
SYSTOLIC BLOOD PRESSURE: 114 MMHG | DIASTOLIC BLOOD PRESSURE: 82 MMHG | WEIGHT: 179 LBS | HEIGHT: 67 IN | BODY MASS INDEX: 28.09 KG/M2

## 2024-05-14 PROCEDURE — 99024 POSTOP FOLLOW-UP VISIT: CPT | Performed by: STUDENT IN AN ORGANIZED HEALTH CARE EDUCATION/TRAINING PROGRAM

## 2024-05-14 RX ORDER — DOCUSATE SODIUM 100 MG/1
100 CAPSULE, LIQUID FILLED ORAL 2 TIMES DAILY
COMMUNITY

## 2024-05-14 NOTE — PROGRESS NOTES
"[facility] - [place]  [address] [place] [address]    Assessment/Plan:  Meera is a 34 y.o. year old  who presents for postpartum visit.    Routine Postpartum Care  Normal postpartum exam  Contraception: condoms as bridge to vasectomy  Depression Screen: Low risk  Feeding: Breast  Cervical cancer screening Up to Date.  Follow up in: 3 months for annual exam or as needed.    Additional Problems:  1. Postpartum care following vaginal delivery        Subjective:     CC: Postpartum visit    Meera Harvey is a 34 y.o. y.o. female  who presents for a postpartum visit.     She is 3 weeks postpartum following a vaginal delivery on 2024 at 39 weeks. Postpartum course has been notable for lactational mastitis, for which she is currently completing a course of dicloxacillin. She reports resolution for breast pain, redness, and fever.      Bleeding staining only. Bowel function is normal. Bladder function is normal. Patient is not sexually active.     Postpartum Depression: Low Risk  (2024)    [place]  Depression Scale     Last EPDS Total Score: 2     Last EPDS Self Harm Result: Never       The following portions of the patient's history were reviewed and updated as appropriate: allergies, current medications, past family history, past medical history, obstetric history, gynecologic history, past social history, past surgical history and problem list.    Objective:  /82 (BP Location: Left arm, Patient Position: Sitting, Cuff Size: Standard)   Ht 5' 7\" (1.702 m)   Wt 81.2 kg (179 lb)   LMP 2023 (Exact Date)   Breastfeeding Yes   BMI 28.04 kg/m²   Pregravid Weight/BMI: 77.6 kg (171 lb) (BMI 26.78)  Current Weight: 81.2 kg (179 lb)   Total Weight Gain: 9.798 kg (21 lb 9.6 oz)   Pre- Vitals      Flowsheet Row Most Recent Value   Prenatal Assessment    Prenatal Vitals    Blood Pressure 114/82   Weight - Scale 81.2 kg (179 lb)   Urine Albumin/Glucose  "   Dilation/Effacement/Station    Vaginal Drainage    Edema              Chaperone present? Yes: Marycruz French MA.    General Appearance: alert and oriented, in no acute distress.   Abdomen: Soft, non-tender, non-distended, no masses, no rebound or guarding.  Pelvic:       External genitalia: Normal appearance, no abnormal pigmentation, no lesions or masses. Normal [employer]'s and [employer]'s. Obstetric laceration well-healing.       Urinary system: Urethral meatus normal, bladder non-tender.      Vaginal: normal mucosa without prolapse or lesions. Normal-appearing physiologic discharge.      Cervix: Normal-appearing, well-epithelialized, no gross lesions or masses. No cervical motion tenderness.      Adnexa: No adnexal masses or tenderness noted.      Uterus: Normal-sized, regular contour, midline, mobile, no uterine tenderness.   Extremities: Normal range of motion.   Skin: normal, no rash or abnormalities  Neurologic: alert, oriented x3  Psychiatric: Appropriate affect, mood stable, cooperative with exam.        Alireza Riggins MD  5/14/2024 11:10 AM

## 2024-06-03 DIAGNOSIS — Z30.011 OCP (ORAL CONTRACEPTIVE PILLS) INITIATION: Primary | ICD-10-CM

## 2024-06-03 RX ORDER — ACETAMINOPHEN AND CODEINE PHOSPHATE 120; 12 MG/5ML; MG/5ML
1 SOLUTION ORAL DAILY
Qty: 84 TABLET | Refills: 1 | Status: SHIPPED | OUTPATIENT
Start: 2024-06-03

## 2024-07-16 RX ORDER — NORETHINDRONE ACETATE AND ETHINYL ESTRADIOL .03; 1.5 MG/1; MG/1
TABLET ORAL
COMMUNITY
Start: 2024-05-31

## 2024-07-17 ENCOUNTER — OFFICE VISIT (OUTPATIENT)
Dept: FAMILY MEDICINE CLINIC | Facility: CLINIC | Age: 34
End: 2024-07-17
Payer: COMMERCIAL

## 2024-07-17 VITALS
WEIGHT: 177.2 LBS | HEIGHT: 67 IN | DIASTOLIC BLOOD PRESSURE: 80 MMHG | OXYGEN SATURATION: 96 % | SYSTOLIC BLOOD PRESSURE: 124 MMHG | RESPIRATION RATE: 18 BRPM | TEMPERATURE: 97.7 F | HEART RATE: 71 BPM | BODY MASS INDEX: 27.81 KG/M2

## 2024-07-17 DIAGNOSIS — D22.9 MULTIPLE NEVI: ICD-10-CM

## 2024-07-17 DIAGNOSIS — M54.2 CERVICALGIA: ICD-10-CM

## 2024-07-17 DIAGNOSIS — M54.42 CHRONIC LEFT-SIDED LOW BACK PAIN WITH LEFT-SIDED SCIATICA: ICD-10-CM

## 2024-07-17 DIAGNOSIS — J32.9 RECURRENT SINUS INFECTIONS: ICD-10-CM

## 2024-07-17 DIAGNOSIS — Z00.00 ANNUAL PHYSICAL EXAM: Primary | ICD-10-CM

## 2024-07-17 DIAGNOSIS — Z13.1 SCREENING FOR DIABETES MELLITUS: ICD-10-CM

## 2024-07-17 DIAGNOSIS — G89.29 CHRONIC LEFT-SIDED LOW BACK PAIN WITH LEFT-SIDED SCIATICA: ICD-10-CM

## 2024-07-17 DIAGNOSIS — Z13.6 SCREENING FOR CARDIOVASCULAR CONDITION: ICD-10-CM

## 2024-07-17 PROBLEM — M54.50 LOWER BACK PAIN: Status: ACTIVE | Noted: 2024-07-17

## 2024-07-17 PROBLEM — S33.5XXA LUMBAR SPRAIN: Status: RESOLVED | Noted: 2024-07-17 | Resolved: 2024-07-17

## 2024-07-17 PROBLEM — M25.569 ARTHRALGIA OF KNEE: Status: RESOLVED | Noted: 2024-07-17 | Resolved: 2024-07-17

## 2024-07-17 PROBLEM — H52.209 ASTIGMATISM: Status: ACTIVE | Noted: 2024-07-17

## 2024-07-17 PROBLEM — M25.559 ARTHRALGIA OF HIP: Status: RESOLVED | Noted: 2024-07-17 | Resolved: 2024-07-17

## 2024-07-17 PROBLEM — S09.90XA HEAD INJURY: Status: RESOLVED | Noted: 2024-07-17 | Resolved: 2024-07-17

## 2024-07-17 PROBLEM — R31.9 HEMATURIA SYNDROME: Status: RESOLVED | Noted: 2024-07-17 | Resolved: 2024-07-17

## 2024-07-17 PROBLEM — H04.129 DRY EYE SYNDROME: Status: RESOLVED | Noted: 2024-07-17 | Resolved: 2024-07-17

## 2024-07-17 PROBLEM — R87.612 LOW GRADE SQUAMOUS INTRAEPITHELIAL LESION (LGSIL) ON CERVICOVAGINAL CYTOLOGIC SMEAR: Status: RESOLVED | Noted: 2024-07-17 | Resolved: 2024-07-17

## 2024-07-17 PROBLEM — N87.0 CERVICAL INTRAEPITHELIAL NEOPLASIA GRADE 1: Status: RESOLVED | Noted: 2024-07-17 | Resolved: 2024-07-17

## 2024-07-17 PROCEDURE — 99395 PREV VISIT EST AGE 18-39: CPT

## 2024-07-17 NOTE — ASSESSMENT & PLAN NOTE
"The patient has suffered from chronic sinus infections and had 3 sets of myringotomy tubes as a child. 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 Reports she was around \"burn pits\". A referral to otolaryngology was placed today.   "

## 2024-07-17 NOTE — PATIENT INSTRUCTIONS
"Patient Education     Routine physical for adults   The Basics   Written by the doctors and editors at [facility]   What is a physical? -- A physical is a routine visit, or \"check-up,\" with your doctor. You might also hear it called a \"wellness visit\" or \"preventive visit.\"  During each visit, the doctor will:   Ask about your physical and mental health   Ask about your habits, behaviors, and lifestyle   Do an exam   Give you vaccines if needed   Talk to you about any medicines you take   Give advice about your health   Answer your questions  Getting regular check-ups is an important part of taking care of your health. It can help your doctor find and treat any problems you have. But it's also important for preventing health problems.  A routine physical is different from a \"sick visit.\" A sick visit is when you see a doctor because of a health concern or problem. Since physicals are scheduled ahead of time, you can think about what you want to ask the doctor.  How often should I get a physical? -- It depends on your age and health. In general, for people age 21 years and older:   If you are younger than 50 years, you might be able to get a physical every 3 years.   If you are 50 years or older, your doctor might recommend a physical every year.  If you have an ongoing health condition, like diabetes or high blood pressure, your doctor will probably want to see you more often.  What happens during a physical? -- In general, each visit will include:   Physical exam - The doctor or nurse will check your height, weight, heart rate, and blood pressure. They will also look at your eyes and ears. They will ask about how you are feeling and whether you have any symptoms that bother you.   Medicines - It's a good idea to bring a list of all the medicines you take to each doctor visit. Your doctor will talk to you about your medicines and answer any questions. Tell them if you are having any side effects that bother you. You " "should also tell them if you are having trouble paying for any of your medicines.   Habits and behaviors - This includes:   Your diet   Your exercise habits   Whether you smoke, drink alcohol, or use drugs   Whether you are sexually active   Whether you feel safe at home  Your doctor will talk to you about things you can do to improve your health and lower your risk of health problems. They will also offer help and support. For example, if you want to quit smoking, they can give you advice and might prescribe medicines. If you want to improve your diet or get more physical activity, they can help you with this, too.   Lab tests, if needed - The tests you get will depend on your age and situation. For example, your doctor might want to check your:   Cholesterol   Blood sugar   Iron level   Vaccines - The recommended vaccines will depend on your age, health, and what vaccines you already had. Vaccines are very important because they can prevent certain serious or deadly infections.   Discussion of screening - \"Screening\" means checking for diseases or other health problems before they cause symptoms. Your doctor can recommend screening based on your age, risk, and preferences. This might include tests to check for:   Cancer, such as breast, prostate, cervical, ovarian, colorectal, prostate, lung, or skin cancer   Sexually transmitted infections, such as chlamydia and gonorrhea   Mental health conditions like depression and anxiety  Your doctor will talk to you about the different types of screening tests. They can help you decide which screenings to have. They can also explain what the results might mean.   Answering questions - The physical is a good time to ask the doctor or nurse questions about your health. If needed, they can refer you to other doctors or specialists, too.  Adults older than 65 years often need other care, too. As you get older, your doctor will talk to you about:   How to prevent falling at " home   Hearing or vision tests   Memory testing   How to take your medicines safely   Making sure that you have the help and support you need at home  All topics are updated as new evidence becomes available and our peer review process is complete.  This topic retrieved from [employer] on: May 02, 2024.  Topic 542918 Version 1.0  Release: 32.4.3 - C32.122  © 2024 UpToDate, Inc. and/or its affiliates. All rights reserved.  Consumer Information Use and Disclaimer   Disclaimer: This generalized information is a limited summary of diagnosis, treatment, and/or medication information. It is not meant to be comprehensive and should be used as a tool to help the user understand and/or assess potential diagnostic and treatment options. It does NOT include all information about conditions, treatments, medications, side effects, or risks that may apply to a specific patient. It is not intended to be medical advice or a substitute for the medical advice, diagnosis, or treatment of a health care provider based on the health care provider's examination and assessment of a patient's specific and unique circumstances. Patients must speak with a health care provider for complete information about their health, medical questions, and treatment options, including any risks or benefits regarding use of medications. This information does not endorse any treatments or medications as safe, effective, or approved for treating a specific patient. UpToDate, Inc. and its affiliates disclaim any warranty or liability relating to this information or the use thereof.The use of this information is governed by the Terms of Use, available at https://www.woltersOrthoScanuwer.com/en/know/clinical-effectiveness-terms. 2024© UpToDate, Inc. and its affiliates and/or licensors. All rights reserved.  Copyright   © 2024 UpToDate, Inc. and/or its affiliates. All rights reserved.

## 2024-07-17 NOTE — PROGRESS NOTES
"Adult Annual Physical  Name: Meera Harvey      : 1990      MRN: [id number]  Encounter Provider: DANIEL Cuello  Encounter Date: 2024   Encounter department: [facility]    Assessment & Plan   1. Annual physical exam  2. Chronic left-sided low back pain with left-sided sciatica  Assessment & Plan:  A referral to physical therapy was placed today. Continue use of OTC pain medications and heat as needed.   Orders:  -     Ambulatory referral to Physical Therapy; Future  3. Multiple nevi  Assessment & Plan:  Referral to dermatology placed today.     Orders:  -     Ambulatory referral to Dermatology; Future  4. Cervicalgia  Assessment & Plan:  A referral to physical therapy was placed today. Continue use of OTC pain medications and heat as needed.   Orders:  -     Ambulatory referral to Physical Therapy; Future  5. Recurrent sinus infections  Assessment & Plan:  The patient has suffered from chronic sinus infections and had 3 sets of myringotomy tubes as a child. 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 Reports she was around \"burn pits\". A referral to otolaryngology was placed today.   Orders:  -     Ambulatory Referral to Otolaryngology; Future  6. Screening for diabetes mellitus  -     Comprehensive metabolic panel; Future; Expected date: 2024  -     Hemoglobin A1C; Future  7. Screening for cardiovascular condition  -     Lipid Panel with Direct LDL reflex; Future; Expected date: 2024  -     CBC and differential; Future; Expected date: 2024  -     Comprehensive metabolic panel; Future; Expected date: 2024  -     TSH, 3rd generation with Free T4 reflex; Future; Expected date: 2024    She is currently completing pelvic floor therapy exercises at home. She will follow up with gynecology. The patient will obtain the lab work ordered today prior to the next " scheduled appointment. The patient will be notified of the results when available. Follow up as needed or at next regularly scheduled appointment.      Immunizations and preventive care screenings were discussed with patient today. Appropriate education was printed on patient's after visit summary.    Counseling:  Alcohol/drug use: discussed moderation in alcohol intake, the recommendations for healthy alcohol use, and avoidance of illicit drug use.  Dental Health: discussed importance of regular tooth brushing, flossing, and dental visits.  Injury prevention: discussed safety/seat belts, safety helmets, smoke detectors, carbon dioxide detectors, and smoking near bedding or upholstery.  Sexual health: discussed sexually transmitted diseases, partner selection, use of condoms, avoidance of unintended pregnancy, and contraceptive alternatives.  Exercise: the importance of regular exercise/physical activity was discussed. Recommend exercise 3-5 times per week for at least 30 minutes.       Depression Screening and Follow-up Plan: Patient was screened for depression during today's encounter. They screened negative with a PHQ-2 score of 0.        History of Present Illness     Adult Annual Physical:  Patient presents for annual physical. Meera Harvey is a 34 y.o. 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 She is currently breastfeeding her 12 week old son.  .     Diet and Physical Activity:  - Diet/Nutrition: well balanced diet and consuming 3-5 servings of fruits/vegetables daily.  - Exercise: strength training exercises, 3-4 times a week on average and walking.    Depression Screening:  - PHQ-2 Score: 0    General Health:  - Sleep: sleeps well and 7-8 hours of sleep on  "average.  - Hearing: normal hearing bilateral ears.  - Vision: goes for regular eye exams and wears glasses and contacts.  - Dental: brushes teeth twice daily and regular dental visits.    /GYN Health:  - Follows with GYN: yes.   - Menopause: premenopausal.   - History of STDs: no  - Contraception:. Currently breastfeeding      Advanced Care Planning:  - Has an advanced directive?: no    - Has a durable medical POA?: no    - ACP document given to patient?: no      Review of Systems   Constitutional: Negative.  Negative for chills, fatigue and fever.   HENT: Negative.  Negative for congestion, ear pain, rhinorrhea and sore throat.    Eyes: Negative.  Negative for pain and visual disturbance.   Respiratory: Negative.  Negative for cough and shortness of breath.    Cardiovascular: Negative.  Negative for chest pain, palpitations and leg swelling.   Gastrointestinal:  Negative for abdominal pain, constipation, diarrhea, nausea and vomiting.   Endocrine: Negative.    Genitourinary: Negative.  Negative for dysuria, frequency and urgency.   Musculoskeletal:  Positive for back pain, myalgias, neck pain and neck stiffness. Negative for arthralgias and gait problem.   Skin: Negative.  Negative for rash.   Allergic/Immunologic: Negative.    Neurological: Negative.  Negative for dizziness, weakness, light-headedness and headaches.   Hematological: Negative.    Psychiatric/Behavioral: Negative.           Objective     /80 (BP Location: Left arm, Patient Position: Sitting, Cuff Size: Standard)   Pulse 71   Temp 97.7 °F (36.5 °C) (Tympanic)   Resp 18   Ht 5' 7\" (1.702 m)   Wt 80.4 kg (177 lb 3.2 oz)   SpO2 96%   BMI 27.75 kg/m²     Physical Exam  Vitals and nursing note reviewed.   Constitutional:       General: She is not in acute distress.     Appearance: Normal appearance. She is not ill-appearing.   HENT:      Head: Normocephalic and atraumatic.      Right Ear: Tympanic membrane, ear canal and external ear " normal. There is no impacted cerumen.      Left Ear: External ear normal. There is impacted cerumen.      Nose: Nose normal. No congestion.      Mouth/Throat:      Mouth: Mucous membranes are moist.      Pharynx: Oropharynx is clear. No posterior oropharyngeal erythema.   Eyes:      Extraocular Movements: Extraocular movements intact.      Conjunctiva/sclera: Conjunctivae normal.      Pupils: Pupils are equal, round, and reactive to light.   Cardiovascular:      Rate and Rhythm: Normal rate and regular rhythm.      Heart sounds: Normal heart sounds. No murmur heard.  Pulmonary:      Effort: Pulmonary effort is normal. No respiratory distress.      Breath sounds: Normal breath sounds. No wheezing.   Abdominal:      General: Abdomen is flat. Bowel sounds are normal.      Palpations: Abdomen is soft.      Tenderness: There is no abdominal tenderness.   Musculoskeletal:         General: No tenderness or deformity. Normal range of motion.      Cervical back: Normal range of motion and neck supple. No tenderness.   Lymphadenopathy:      Cervical: No cervical adenopathy.   Skin:     General: Skin is warm and dry.      Capillary Refill: Capillary refill takes less than 2 seconds.      Findings: No bruising or rash.   Neurological:      General: No focal deficit present.      Mental Status: She is alert and oriented to person, place, and time.   Psychiatric:         Mood and Affect: Mood normal.         Behavior: Behavior normal.

## 2024-07-17 NOTE — ASSESSMENT & PLAN NOTE
A referral to physical therapy was placed today. Continue use of OTC pain medications and heat as needed.

## 2024-07-22 ENCOUNTER — APPOINTMENT (OUTPATIENT)
Dept: LAB | Facility: CLINIC | Age: 34
End: 2024-07-22
Payer: COMMERCIAL

## 2024-07-22 DIAGNOSIS — Z13.6 SCREENING FOR CARDIOVASCULAR CONDITION: ICD-10-CM

## 2024-07-22 DIAGNOSIS — Z13.1 SCREENING FOR DIABETES MELLITUS: ICD-10-CM

## 2024-07-22 LAB
ALBUMIN SERPL BCG-MCNC: 4.1 G/DL (ref 3.5–5)
ALP SERPL-CCNC: 44 U/L (ref 34–104)
ALT SERPL W P-5'-P-CCNC: 16 U/L (ref 7–52)
ANION GAP SERPL CALCULATED.3IONS-SCNC: 11 MMOL/L (ref 4–13)
AST SERPL W P-5'-P-CCNC: 21 U/L (ref 13–39)
BASOPHILS # BLD AUTO: 0.04 THOUSANDS/ÂΜL (ref 0–0.1)
BASOPHILS NFR BLD AUTO: 1 % (ref 0–1)
BILIRUB SERPL-MCNC: 0.75 MG/DL (ref 0.2–1)
BUN SERPL-MCNC: 21 MG/DL (ref 5–25)
CALCIUM SERPL-MCNC: 9.3 MG/DL (ref 8.4–10.2)
CHLORIDE SERPL-SCNC: 106 MMOL/L (ref 96–108)
CHOLEST SERPL-MCNC: 170 MG/DL
CO2 SERPL-SCNC: 22 MMOL/L (ref 21–32)
CREAT SERPL-MCNC: 0.85 MG/DL (ref 0.6–1.3)
EOSINOPHIL # BLD AUTO: 0.17 THOUSAND/ÂΜL (ref 0–0.61)
EOSINOPHIL NFR BLD AUTO: 3 % (ref 0–6)
ERYTHROCYTE [DISTWIDTH] IN BLOOD BY AUTOMATED COUNT: 12.5 % (ref 11.6–15.1)
EST. AVERAGE GLUCOSE BLD GHB EST-MCNC: 100 MG/DL
GFR SERPL CREATININE-BSD FRML MDRD: 89 ML/MIN/1.73SQ M
GLUCOSE SERPL-MCNC: 63 MG/DL (ref 65–140)
HBA1C MFR BLD: 5.1 %
HCT VFR BLD AUTO: 42.8 % (ref 34.8–46.1)
HDLC SERPL-MCNC: 62 MG/DL
HGB BLD-MCNC: 13.6 G/DL (ref 11.5–15.4)
IMM GRANULOCYTES # BLD AUTO: 0.02 THOUSAND/UL (ref 0–0.2)
IMM GRANULOCYTES NFR BLD AUTO: 0 % (ref 0–2)
LDLC SERPL CALC-MCNC: 98 MG/DL (ref 0–100)
LYMPHOCYTES # BLD AUTO: 2.18 THOUSANDS/ÂΜL (ref 0.6–4.47)
LYMPHOCYTES NFR BLD AUTO: 34 % (ref 14–44)
MCH RBC QN AUTO: 28.8 PG (ref 26.8–34.3)
MCHC RBC AUTO-ENTMCNC: 31.8 G/DL (ref 31.4–37.4)
MCV RBC AUTO: 91 FL (ref 82–98)
MONOCYTES # BLD AUTO: 0.52 THOUSAND/ÂΜL (ref 0.17–1.22)
MONOCYTES NFR BLD AUTO: 8 % (ref 4–12)
NEUTROPHILS # BLD AUTO: 3.53 THOUSANDS/ÂΜL (ref 1.85–7.62)
NEUTS SEG NFR BLD AUTO: 54 % (ref 43–75)
NRBC BLD AUTO-RTO: 0 /100 WBCS
PLATELET # BLD AUTO: 299 THOUSANDS/UL (ref 149–390)
PMV BLD AUTO: 10 FL (ref 8.9–12.7)
POTASSIUM SERPL-SCNC: 4.1 MMOL/L (ref 3.5–5.3)
PROT SERPL-MCNC: 7.2 G/DL (ref 6.4–8.4)
RBC # BLD AUTO: 4.72 MILLION/UL (ref 3.81–5.12)
SODIUM SERPL-SCNC: 139 MMOL/L (ref 135–147)
TRIGL SERPL-MCNC: 50 MG/DL
TSH SERPL DL<=0.05 MIU/L-ACNC: 1.74 UIU/ML (ref 0.45–4.5)
WBC # BLD AUTO: 6.46 THOUSAND/UL (ref 4.31–10.16)

## 2024-07-22 PROCEDURE — 36415 COLL VENOUS BLD VENIPUNCTURE: CPT

## 2024-07-22 PROCEDURE — 84443 ASSAY THYROID STIM HORMONE: CPT

## 2024-07-22 PROCEDURE — 85025 COMPLETE CBC W/AUTO DIFF WBC: CPT

## 2024-07-22 PROCEDURE — 80061 LIPID PANEL: CPT

## 2024-07-22 PROCEDURE — 83036 HEMOGLOBIN GLYCOSYLATED A1C: CPT

## 2024-07-22 PROCEDURE — 80053 COMPREHEN METABOLIC PANEL: CPT

## 2024-08-02 ENCOUNTER — EVALUATION (OUTPATIENT)
Dept: PHYSICAL THERAPY | Facility: CLINIC | Age: 34
End: 2024-08-02
Payer: COMMERCIAL

## 2024-08-02 DIAGNOSIS — M54.42 CHRONIC LEFT-SIDED LOW BACK PAIN WITH LEFT-SIDED SCIATICA: Primary | ICD-10-CM

## 2024-08-02 DIAGNOSIS — G89.29 CHRONIC LEFT-SIDED LOW BACK PAIN WITH LEFT-SIDED SCIATICA: Primary | ICD-10-CM

## 2024-08-02 DIAGNOSIS — M54.2 CERVICALGIA: ICD-10-CM

## 2024-08-02 PROCEDURE — 97161 PT EVAL LOW COMPLEX 20 MIN: CPT | Performed by: PHYSICAL THERAPIST

## 2024-08-02 PROCEDURE — 97140 MANUAL THERAPY 1/> REGIONS: CPT | Performed by: PHYSICAL THERAPIST

## 2024-08-02 PROCEDURE — 97110 THERAPEUTIC EXERCISES: CPT | Performed by: PHYSICAL THERAPIST

## 2024-08-02 NOTE — PROGRESS NOTES
PT Evaluation     Today's date: 2024  Patient name: Meera Harvey  : 1990  MRN: [id number]  Referring provider: Leslie Dumas CRNP  Dx:   Encounter Diagnosis     ICD-10-CM    1. Chronic left-sided low back pain with left-sided sciatica  M54.42 Ambulatory referral to Physical Therapy    G89.29       2. Cervicalgia  M54.2 Ambulatory referral to Physical Therapy                     Assessment  Impairments: abnormal muscle firing, abnormal or restricted ROM, activity intolerance, impaired physical strength, lacks appropriate home exercise program, pain with function and poor posture   Symptom irritability: moderate    Assessment details: Meera Harvey is a 34 y.o. female who presents with pain, decreased strength, decreased ROM, decreased joint mobility, and postural dysfunction. Due to these impairments, patient has difficulty performing a/iadls, recreational activities, and engaging in social activities. Patient's clinical presentation is consistent with their referring diagnosis of Chronic left-sided low back pain with left-sided sciatica, Cervicalgia. Patient has been educated in home exercise program and plan of care. Patient would benefit from skilled physical therapy services to address their aforementioned functional limitations and progress towards prior level of function and independence with home exercise program.   Understanding of Dx/Px/POC: good     Prognosis: good    Goals  Short Term Goals:  Target Date 4 weeks  1. Pt will initiate and advance HEP.  2. Pt will have < 3/10 pain  3. Pt will have full arom of the l/s and c/s  4. Pt will be able to carry children with out difficulty    Long Term Goals:  Target Date 8 weeks  1. Pt will demonstrate independence in HEP.  2. Pt will have <1/10 pain  3. Pt will have full c/s and B UE strength  4. Pt will be able to perform all adl's with out difficulty       Plan  Patient would benefit from: skilled PT  Planned modality interventions:  cryotherapy, electrical stimulation/Russian stimulation and thermotherapy: hydrocollator packs    Planned therapy interventions: joint mobilization, manual therapy, patient education, postural training, activity modification, abdominal trunk stabilization, body mechanics training, flexibility, functional ROM exercises, graded exercise, home exercise program, neuromuscular re-education, strengthening, stretching, therapeutic activities, therapeutic exercise, motor coordination training, balance/weight bearing training and ADL training    Frequency: 1x week  Duration in weeks: 8  Plan of Care beginning date: 2024  Plan of Care expiration date: 2024  Treatment plan discussed with: patient        Subjective Evaluation    History of Present Illness  Mechanism of injury: Pt notes that she has been having left sided c/s pain with radiating pain to the elbow, and n/t into the fingers for about 12 years. 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 Pt notes that the pain would wax and wane over the years, but has bee more consistent recently over the past 2 years since the birth of her youngest. She notes that reaching into the crib, changing diapers and breast feeding are all uncomfortable. Pt notes that she also has been having some left sided l/s pain as well since the last child. She notes that this goes to the hip only and not as severe as the c/s. She does note that stretches help each pain, but that it never gets rid of it. Heat does help as well.    Patient Goals  Patient goals for therapy: decreased pain, increased motion, independence with ADLs/IADLs, increased strength and return to sport/leisure activities    Pain  Current pain ratin  At best pain ratin  At worst pain ratin  Location: left l/s and c/s  Quality: dull ache, discomfort, radiating, tight and throbbing          Objective     Static Posture   General Observations  Tilted  right, flexed and guarded.     Head  Forward.    Cervical Spine  Tilted right and rotated right.    Shoulders  Asymmetric shoulders and rounded.    Scapulae  Left anteriorly tipped, right anteriorly tipped and left upwardly rotated.    Thoracic Spine  Hyperkyphosis.    Lumbar Spine   Flattened.     Palpation   Left   Tenderness of the pectoralis major, pectoralis minor, quadratus lumborum, scalenes and suboccipitals.     Right   No palpable tenderness to the quadratus lumborum.   Tenderness of the pectoralis major, pectoralis minor and scalenes.     Active Range of Motion   Cervical/Thoracic Spine       Thoracic    Extension:  Restriction level: moderate    Lumbar   Flexion:  Restriction level: minimal  Extension:  Restriction level: moderate  Left lateral flexion:  Restriction level: minimal  Right lateral flexion:  Restriction level: minimal  Left rotation:  Restriction level: minimal  Right rotation:  Restriction level: minimal    Scapular Mobility   Left Shoulder   Scapular mobility: poor    Right Shoulder   Scapular mobility: good    Joint Play     Hypomobile: T8, T9, T10 and T11     Strength/Myotome Testing     Left Shoulder     Isolated Muscles   Lower trapezius: 3-   Middle trapezius: 4-     Right Shoulder     Isolated Muscles   Lower trapezius: 3-   Middle trapezius: 4-     Left Hip   Planes of Motion   Extension: 4  Adduction: 4+    Right Hip   Planes of Motion   Extension: 4  Adduction: 4+    Left Knee   Flexion: 4-    Tests   Cervical   Positive vertical compression, cervical distraction test and neck flexor muscle endurance test.    Lumbar   Positive vertical compression .              Precautions: none      Manuals 8/2            Traction c/s DB            Scalene stm nv            T/s pa and rot mobs Grade 2 DB            Left QL and glute stm nv            Sacral rocking nv            Neuro Re-Ed             Chin tuck with head lift nv            Supine pnf on foam roller nv            Supine H abd  on foam roller nv            Qped chin tuck nv            Qped H abd nv            Primal push up nv                         Ther Ex             Ellip for endurance Nv             Prone quad stretch nv            Bridge with band Blue nv                                                                             Ther Activity                                       Gait Training                                       Modalities             HEP DB

## 2024-08-09 ENCOUNTER — OFFICE VISIT (OUTPATIENT)
Dept: PHYSICAL THERAPY | Facility: CLINIC | Age: 34
End: 2024-08-09
Payer: COMMERCIAL

## 2024-08-09 DIAGNOSIS — M54.2 CERVICALGIA: ICD-10-CM

## 2024-08-09 DIAGNOSIS — G89.29 CHRONIC LEFT-SIDED LOW BACK PAIN WITH LEFT-SIDED SCIATICA: Primary | ICD-10-CM

## 2024-08-09 DIAGNOSIS — M54.42 CHRONIC LEFT-SIDED LOW BACK PAIN WITH LEFT-SIDED SCIATICA: Primary | ICD-10-CM

## 2024-08-09 PROCEDURE — 97140 MANUAL THERAPY 1/> REGIONS: CPT

## 2024-08-09 PROCEDURE — 97112 NEUROMUSCULAR REEDUCATION: CPT

## 2024-08-09 NOTE — PROGRESS NOTES
"Daily Note     Today's date: 2024  Patient name: Meera Harvey  : 1990  MRN: [id number]  Referring provider: Leslie Dumas CRNP  Dx:   Encounter Diagnosis     ICD-10-CM    1. Chronic left-sided low back pain with left-sided sciatica  M54.42     G89.29       2. Cervicalgia  M54.2           Start Time: 08  Stop Time: 0848  Total time in clinic (min): 45 minutes    Subjective: Patient states that pain levels have improved greatly since IE, but states that she finds challenge with scap activation and correct c-s retraction. Post manuals she notes improvement in shoulder mobility in C-S mobility.      Objective: See treatment diary below      Assessment: Session initiated with C-S review for correct scap activation, and c-s retraction. Patient cued to practice retractions with B/L er against wall, in supine and then independently to improve form via tactile cues. Consent received prior to all manuals this session with mobilizations performed by DPT. Mod TTP in R UT>L and b/l scalenes. Mod TTP in R>L QL this session, but improvement in soft tissue restriction post STM. Q-ped chin tucks instructed for home this visit, requiring some tactile cues from therapist. Patient would benefit from continued PT for further strengthening and mobility.    Plan: Continue per plan of care.      Precautions: none      Manuals            Traction c/s DB LQ B/L           Scalene stm nv LQ B/L           T/s pa and rot mobs Grade 2 DB Grade 2-3 DB           Left QL and glute stm nv LQ B/L             Sacral rocking nv            Neuro Re-Ed             Chin tuck with head lift nv X10, 5\"           Supine pnf on foam roller nv            Supine H abd on foam roller nv 10\"x10           Qped chin tuck nv 10\"x10           Qped H abd nv            Primal push up nv                         Ther Ex             Ellip for endurance Nv  UBE posture/endurance 2'/2'           Prone quad stretch nv NV           Bridge with band " Blue nv NV                                                                            Ther Activity                                       Gait Training                                       Modalities             HEP DB

## 2024-08-12 ENCOUNTER — ANNUAL EXAM (OUTPATIENT)
Dept: OBGYN CLINIC | Facility: CLINIC | Age: 34
End: 2024-08-12
Payer: COMMERCIAL

## 2024-08-12 VITALS
BODY MASS INDEX: 26.97 KG/M2 | SYSTOLIC BLOOD PRESSURE: 118 MMHG | WEIGHT: 171.8 LBS | HEIGHT: 67 IN | DIASTOLIC BLOOD PRESSURE: 84 MMHG

## 2024-08-12 DIAGNOSIS — Z30.011 OCP (ORAL CONTRACEPTIVE PILLS) INITIATION: ICD-10-CM

## 2024-08-12 DIAGNOSIS — Z01.419 ENCOUNTER FOR ANNUAL ROUTINE GYNECOLOGICAL EXAMINATION: Primary | ICD-10-CM

## 2024-08-12 DIAGNOSIS — M62.89 PELVIC FLOOR DYSFUNCTION IN FEMALE: ICD-10-CM

## 2024-08-12 PROCEDURE — 99395 PREV VISIT EST AGE 18-39: CPT | Performed by: STUDENT IN AN ORGANIZED HEALTH CARE EDUCATION/TRAINING PROGRAM

## 2024-08-12 RX ORDER — ACETAMINOPHEN AND CODEINE PHOSPHATE 120; 12 MG/5ML; MG/5ML
1 SOLUTION ORAL DAILY
Qty: 84 TABLET | Refills: 4 | Status: SHIPPED | OUTPATIENT
Start: 2024-08-12

## 2024-08-12 NOTE — ASSESSMENT & PLAN NOTE
- Reports some difficulty terminating urine stream with some post-void dribbling. Discussed option to referral to PT for evaluation and treatment, which she desires. Referral provided today.

## 2024-08-12 NOTE — PROGRESS NOTES
[facility] - [place]  [address] [place] [address]    ASSESSMENT/PLAN: Meera Harvey is a 34 y.o.  who presents for annual gynecologic exam.    Encounter for routine gynecologic examination  - Routine well woman exam completed today.  - Cervical Cancer Screening: Current ASCCP Guidelines reviewed. Last Pap: 2022. History of abnormal: Remote history, returned to normal.   - HPV Vaccination status: Completed 1 of 3 doses. Reviewed availability of vaccination up to age 45.  - STI screening offered including HIV testing: offered, pt declined  - Contraceptive counseling discussed. Current contraception: oral progesterone-only contraceptive as bridge to vasectomy. Rx refilled.  - The following were reviewed in today's visit: breast self exam, exercise, and healthy diet    Additional problems addressed during this visit:  1. Encounter for annual routine gynecological examination  2. OCP (oral contraceptive pills) initiation  -     norethindrone (Ortho Micronor) 0.35 MG tablet; Take 1 tablet (0.35 mg total) by mouth daily  3. Pelvic floor dysfunction in female  Assessment & Plan:  - Reports some difficulty terminating urine stream with some post-void dribbling. Discussed option to referral to PT for evaluation and treatment, which she desires. Referral provided today.       CC:  Annual Gynecologic Examination    HPI: Meera Harvey is a 34 y.o.  who presents for annual gynecologic examination. She is without complaint today.     ROS: Negative except as noted in HPI    No LMP recorded (lmp unknown).       She  reports being sexually active and has had partner(s) who are male. She reports using the following methods of birth control/protection: Pill and OCP.       The following portions of the patient's history were reviewed and updated as appropriate:   Past Medical History:   Diagnosis Date    Abnormal Pap smear of cervix     just follow up [id number] abnormal since     Arthralgia of hip 07/17/2024    Arthralgia of knee 07/17/2024    Cervical intraepithelial neoplasia grade 1 07/17/2024    Sep 23, 2013 Entered By: AMANDA TRAMMELL Comment: pap 2012      Dry eye syndrome 07/17/2024    Head injury 07/17/2024    Hematuria syndrome 07/17/2024    Migraine     seasonal-food related-last episode 4/2023. No neuro work up. Resolve with tylenol/advil    UTI (urinary tract infection)     Last episode 2020    Varicella     chicken pox as child     History reviewed. No pertinent surgical history.  Family History   Problem Relation Age of Onset    Hypertension Mother     Depression Mother     Diverticulitis Father     No Known Problems Brother     Pancreatic cancer Maternal Grandmother     Dementia Maternal Grandfather     Heart disease Paternal Grandmother     Diabetes Paternal Grandmother     Hypertension Paternal Grandmother     Hyperlipidemia Paternal Grandmother     Asthma Paternal Grandmother     Heart disease Paternal Grandfather     Diabetes Paternal Grandfather     Hypertension Paternal Grandfather     Hyperlipidemia Paternal Grandfather     Asthma Paternal Grandfather     Breast cancer Neg Hx     Ovarian cancer Neg Hx     Uterine cancer Neg Hx     Colon cancer Neg Hx      Social History     Socioeconomic History    Marital status: /Civil Union     Spouse name: None    Number of children: None    Years of education: None    Highest education level: None   Occupational History    None   Tobacco Use    Smoking status: Never     Passive exposure: Never    Smokeless tobacco: Never   Vaping Use    Vaping status: Never Used   Substance and Sexual Activity    Alcohol use: Not Currently    Drug use: Never    Sexual activity: Yes     Partners: Male     Birth control/protection: Pill, OCP     Comment: no new partner in past year   Other Topics Concern    None   Social History Narrative    None     Social Determinants of Health     Financial Resource Strain: Not on file   Food Insecurity: No  "Food Insecurity (3/27/2024)    Hunger Vital Sign     Worried About Running Out of Food in the Last Year: Never true     Ran Out of Food in the Last Year: Never true   Transportation Needs: No Transportation Needs (3/27/2024)    PRAPARE - Transportation     Lack of Transportation (Medical): No     Lack of Transportation (Non-Medical): No   Physical Activity: Not on file   Stress: Not on file   Social Connections: Not on file   Intimate Partner Violence: Not on file   Housing Stability: Low Risk  (3/27/2024)    Housing Stability Vital Sign     Unable to Pay for Housing in the Last Year: No     Number of Times Moved in the Last Year: 1     Homeless in the Last Year: No     Outpatient Medications Marked as Taking for the 8/12/24 encounter (Annual Exam) with Alireza Riggins MD   Medication    norethindrone (Ortho Micronor) 0.35 MG tablet    [DISCONTINUED] norethindrone (Ortho Micronor) 0.35 MG tablet     No Known Allergies        Objective:  /84 (BP Location: Left arm, Patient Position: Sitting, Cuff Size: Standard)   Ht 5' 7\" (1.702 m)   Wt 77.9 kg (171 lb 12.8 oz)   LMP  (LMP Unknown)   Breastfeeding Yes   BMI 26.91 kg/m²        Chaperone present? Yes: Valentin Islas MA.    General Appearance: alert and oriented, in no acute distress.   Neck/Thyroid: No thyromegaly, no thyroid nodules.  Respiratory: Unlabored breathing.  Cardiovascular: Regular rate, no peripheral edema.  Abdomen: Soft, non-tender, non-distended, no masses, no rebound or guarding.  Breast Exam: No dimpling, nipple retraction or discharge. No lumps or masses. No axillary or supraclavicular nodes.   Pelvic:       External genitalia: Normal appearance, no abnormal pigmentation, no lesions or masses. Normal [place]'s and [place]'s.      Urinary system: Urethral meatus normal, bladder non-tender.      Vaginal: normal mucosa without prolapse or lesions. Normal-appearing physiologic discharge.      Cervix: Normal-appearing, well-epithelialized, no " gross lesions or masses. No cervical motion tenderness.      Adnexa: No adnexal masses or tenderness noted.      Uterus: Normal-sized, regular contour, midline, mobile, no uterine tenderness.  Extremities: Normal range of motion. Warm, well-perfused, non-tender.   Skin: normal, no rash or abnormalities  Neurologic: alert, oriented x3  Psychiatric: Appropriate affect, mood stable, cooperative with exam.        Alireza Riggins MD  8/12/2024 5:08 PM

## 2024-08-16 ENCOUNTER — OFFICE VISIT (OUTPATIENT)
Dept: PHYSICAL THERAPY | Facility: CLINIC | Age: 34
End: 2024-08-16
Payer: COMMERCIAL

## 2024-08-16 DIAGNOSIS — M54.2 CERVICALGIA: ICD-10-CM

## 2024-08-16 DIAGNOSIS — M54.42 CHRONIC LEFT-SIDED LOW BACK PAIN WITH LEFT-SIDED SCIATICA: Primary | ICD-10-CM

## 2024-08-16 DIAGNOSIS — G89.29 CHRONIC LEFT-SIDED LOW BACK PAIN WITH LEFT-SIDED SCIATICA: Primary | ICD-10-CM

## 2024-08-16 PROBLEM — J32.9 RECURRENT SINUS INFECTIONS: Status: RESOLVED | Noted: 2024-07-17 | Resolved: 2024-08-16

## 2024-08-16 PROCEDURE — 97110 THERAPEUTIC EXERCISES: CPT

## 2024-08-16 PROCEDURE — 97112 NEUROMUSCULAR REEDUCATION: CPT

## 2024-08-16 PROCEDURE — 97140 MANUAL THERAPY 1/> REGIONS: CPT

## 2024-08-16 NOTE — PROGRESS NOTES
"Daily Note     Today's date: 2024  Patient name: Meera Harvey  : 1990  MRN: [id number]  Referring provider: Leslie Dumas CRNP  Dx:   Encounter Diagnosis     ICD-10-CM    1. Chronic left-sided low back pain with left-sided sciatica  M54.42     G89.29       2. Cervicalgia  M54.2             Start Time: 0802  Stop Time: 0858  Total time in clinic (min): 56 minutes    Subjective: Patient continues to experience gains in C-S thoracic/ c-s and scap mobility.      Objective: See treatment diary below. HEP updated and reviewed.      Assessment: Session initiated with manuals. Mod soft tissue restriction continues at L>R scalenes but improved from LV. Minimal glute soft tissue restriction noted, but L sided lumbar paraspinals tightness continues with good response to manuals. Primal push ups for core stabilization and c-s mobility added this session. T-S q-ped ROT with hands on neck also added for T-S mobility. Patient demonstrates limitations in T-S ROT 2* to stiffness in CTJ, but does improve with repeated motion. Patient would benefit from continue PT to improve overall function.     Plan: Continue per plan of care.  Add CTJ mobilizations. Add  D2 flexion/ extension B/L on foam roller.     Precautions: none      Manuals           Traction c/s DB LQ B/L LQ B/L          Scalene stm nv LQ B/L LQ B/L          T/s pa and rot mobs Grade 2 DB Grade 2-3 DB NV +CTJ?         Left QL and glute stm nv LQ B/L   LQ b/L          Sacral rocking nv            Neuro Re-Ed             Chin tuck with head lift nv X10, 5\" x10          Supine pnf on foam roller nv  NV          Supine H abd on foam roller nv 10\"x10 2x10          Qped chin tuck nv 10\"x10 2x10, 3\"          Qped H abd nv  T-S ROT hand on neck  x15ea          Primal push up nv  X15, 3\"                       Ther Ex             Ellip for endurance Nv  UBE posture/endurance 2'/2' UBE posture/endurance 4'/4'          Prone quad stretch nv NV 30\"x4     "      Bridge with band Blue nv NV NV                                                                           Ther Activity                                       Gait Training                                       Modalities             HEP DB                              [email or web address]  Access Code: [email or web address]

## 2024-08-23 ENCOUNTER — OFFICE VISIT (OUTPATIENT)
Dept: PHYSICAL THERAPY | Facility: CLINIC | Age: 34
End: 2024-08-23
Payer: COMMERCIAL

## 2024-08-23 DIAGNOSIS — M54.2 CERVICALGIA: ICD-10-CM

## 2024-08-23 DIAGNOSIS — G89.29 CHRONIC LEFT-SIDED LOW BACK PAIN WITH LEFT-SIDED SCIATICA: Primary | ICD-10-CM

## 2024-08-23 DIAGNOSIS — M54.42 CHRONIC LEFT-SIDED LOW BACK PAIN WITH LEFT-SIDED SCIATICA: Primary | ICD-10-CM

## 2024-08-23 PROCEDURE — 97140 MANUAL THERAPY 1/> REGIONS: CPT | Performed by: PHYSICAL THERAPIST

## 2024-08-23 PROCEDURE — 97110 THERAPEUTIC EXERCISES: CPT | Performed by: PHYSICAL THERAPIST

## 2024-08-23 NOTE — PROGRESS NOTES
"Daily Note     Today's date: 2024  Patient name: Meera Harvey  : 1990  MRN: [id number]  Referring provider: Leslie Dumas CRNP  Dx:   Encounter Diagnosis     ICD-10-CM    1. Chronic left-sided low back pain with left-sided sciatica  M54.42     G89.29       2. Cervicalgia  M54.2                      Subjective: pt notes that she has been doing well. But did have a minor set back with increased pain, that then lead to her going back to basic exercises which helped with bringing pain down again.       Objective: See treatment diary below      Assessment: Patient tolerated treatment well as shown by ability to increase thoracic mobility during thoracic spine rotation. Session initiated with c/s traction, scalene stm, and t/s pa and rotation mobs to which patient experience limited symptom reproduction. Patient would benefit from continued physical therapy to address limited trunk ROM to improve daily function and decrease symptom reproduction.      Plan: Continue per plan of care.      Precautions: none      Manuals          Traction c/s DB LQ B/L LQ B/L DB         Scalene stm nv LQ B/L LQ B/L DB         T/s pa and rot mobs Grade 2 DB Grade 2-3 DB NV Broad bi, focused bi, focused uni grade 2-4 DB         Left QL and glute stm nv LQ B/L   LQ b/L          Sacral rocking nv            Neuro Re-Ed             Chin tuck with head lift nv X10, 5\" x10          Supine pnf on foam roller nv  NV          Supine H abd on foam roller nv 10\"x10 2x10          Qped chin tuck nv 10\"x10 2x10, 3\"          Qped H abd nv  T-S ROT hand on neck  x15ea X5 ea         Primal push up nv  X15, 3\"                       Ther Ex             Ellip for endurance Nv  UBE posture/endurance 2'/2' UBE posture/endurance 4'/4' UBE posture/endurance 4'/4'         Prone quad stretch nv NV 30\"x4          Bridge with band Blue nv NV NV          Thoracic spine rotation s/l    5''x15 ea                                         "                     Ther Activity                                       Gait Training                                       Modalities             HEP DB                              [email or web address]  Access Code: [email or web address]

## 2024-08-30 ENCOUNTER — APPOINTMENT (OUTPATIENT)
Dept: PHYSICAL THERAPY | Facility: CLINIC | Age: 34
End: 2024-08-30
Payer: COMMERCIAL

## 2024-09-06 ENCOUNTER — OFFICE VISIT (OUTPATIENT)
Dept: PHYSICAL THERAPY | Facility: CLINIC | Age: 34
End: 2024-09-06
Payer: COMMERCIAL

## 2024-09-06 DIAGNOSIS — G89.29 CHRONIC LEFT-SIDED LOW BACK PAIN WITH LEFT-SIDED SCIATICA: Primary | ICD-10-CM

## 2024-09-06 DIAGNOSIS — M54.42 CHRONIC LEFT-SIDED LOW BACK PAIN WITH LEFT-SIDED SCIATICA: Primary | ICD-10-CM

## 2024-09-06 DIAGNOSIS — M54.2 CERVICALGIA: ICD-10-CM

## 2024-09-06 PROCEDURE — 97140 MANUAL THERAPY 1/> REGIONS: CPT | Performed by: PHYSICAL THERAPIST

## 2024-09-06 PROCEDURE — 97112 NEUROMUSCULAR REEDUCATION: CPT | Performed by: PHYSICAL THERAPIST

## 2024-09-06 PROCEDURE — 97110 THERAPEUTIC EXERCISES: CPT | Performed by: PHYSICAL THERAPIST

## 2024-09-06 NOTE — PROGRESS NOTES
"Daily Note     Today's date: 2024  Patient name: Meera Harvey  : 1990  MRN: [id number]  Referring provider: Leslie Dumas CRNP  Dx:   Encounter Diagnosis     ICD-10-CM    1. Chronic left-sided low back pain with left-sided sciatica  M54.42     G89.29       2. Cervicalgia  M54.2                      Subjective: Pt reports gaining more movement on the R side but L is still tight with rotational movements. Pt also notes flare up of stiffness in L side of neck.      Objective: See treatment diary below      Assessment: Tolerated treatment well as indicated by ability to complete new addition of seated scapular depression without increased symptoms. Educated on adding deep breathe into end movement of S/L rotation to move deeper into movement. Also educated on updated HEP and to trial seated scapular depression when neck tightness increases. Patient would benefit from continued PT to address functional deficits of decreased ROM to improve ADLs.      Plan: Continue per plan of care.      Precautions: none      Manuals         Traction c/s DB LQ B/L LQ B/L DB DB        Scalene stm nv LQ B/L LQ B/L DB         T/s pa and rot mobs Grade 2 DB Grade 2-3 DB NV Broad bi, focused bi, focused uni grade 2-4 DB Uni PA grade 2-4  DB        Left QL and glute stm nv LQ B/L   LQ b/L          Sacral rocking nv            Neuro Re-Ed             Chin tuck with head lift nv X10, 5\" x10          Supine pnf on foam roller nv  NV          Supine H abd on foam roller nv 10\"x10 2x10          Qped chin tuck nv 10\"x10 2x10, 3\"          Qped H abd nv  T-S ROT hand on neck  x15ea X5 ea 10x ea        Primal push up nv  X15, 3\"  10x 3\"        Seated scapular depression     20x3\"        Ther Ex             Ellip for endurance Nv  UBE posture/endurance 2'/2' UBE posture/endurance 4'/4' UBE posture/endurance 4'/4' UBE  Posture/endurance  4'/4' Ellip next visit         Prone quad stretch nv NV 30\"x4          Bridge " "with band Blue nv NV NV          Thoracic spine rotation s/l    5''x15 ea 5\" x10 ea                                                            Ther Activity                                       Gait Training                                       Modalities             HEP DB                              [email or web address]  Access Code: [email or web address]    Patient treated by BOUBACAR Oakley under direct supervision of Luke Encarnacion PTMelissa DPT.       "

## 2024-09-13 ENCOUNTER — OFFICE VISIT (OUTPATIENT)
Dept: PHYSICAL THERAPY | Facility: CLINIC | Age: 34
End: 2024-09-13
Payer: COMMERCIAL

## 2024-09-13 DIAGNOSIS — M54.2 CERVICALGIA: ICD-10-CM

## 2024-09-13 DIAGNOSIS — G89.29 CHRONIC LEFT-SIDED LOW BACK PAIN WITH LEFT-SIDED SCIATICA: Primary | ICD-10-CM

## 2024-09-13 DIAGNOSIS — M54.42 CHRONIC LEFT-SIDED LOW BACK PAIN WITH LEFT-SIDED SCIATICA: Primary | ICD-10-CM

## 2024-09-13 PROCEDURE — 97112 NEUROMUSCULAR REEDUCATION: CPT | Performed by: PHYSICAL THERAPIST

## 2024-09-13 PROCEDURE — 97110 THERAPEUTIC EXERCISES: CPT | Performed by: PHYSICAL THERAPIST

## 2024-09-13 PROCEDURE — 97140 MANUAL THERAPY 1/> REGIONS: CPT | Performed by: PHYSICAL THERAPIST

## 2024-09-13 NOTE — PROGRESS NOTES
"Daily Note     Today's date: 2024  Patient name: Meera Harvey  : 1990  MRN: [id number]  Referring provider: Leslie Dumas CRNP  Dx:   Encounter Diagnosis     ICD-10-CM    1. Chronic left-sided low back pain with left-sided sciatica  M54.42     G89.29       2. Cervicalgia  M54.2                      Subjective: Patient reports increased soreness in mid trap due to addition of pushups over the past week      Objective: See treatment diary below      Assessment: Tolerated treatment well indicated by ability to complete all exercises without increased symptoms. Shoulder extension, shows at 45 degrees, and shoulder face pulls with TB added into plan today. Educated patient on updated HEP. Patient would benefit from continued PT to increase strength and ROM to progress towards prior daily function.      Plan: Continue per plan of care.      Precautions: none      Manuals        Traction c/s DB LQ B/L LQ B/L DB DB        Scalene stm nv LQ B/L LQ B/L DB         T/s pa and rot mobs Grade 2 DB Grade 2-3 DB NV Broad bi, focused bi, focused uni grade 2-4 DB Uni PA grade 2-4  DB LF       Left QL and glute stm nv LQ B/L   LQ b/L          Sacral rocking nv            Neuro Re-Ed             Chin tuck with head lift nv X10, 5\" x10          Supine pnf on foam roller nv  NV          Supine H abd on foam roller nv 10\"x10 2x10   2x10       Qped chin tuck nv 10\"x10 2x10, 3\"          Qped H abd nv  T-S ROT hand on neck  x15ea X5 ea 10x ea        Primal push up nv  X15, 3\"  10x 3\" 10x 3\"       Seated scapular depression     20x3\"        Ther Ex             Ellip for endurance Nv  UBE posture/endurance 2'/2' UBE posture/endurance 4'/4' UBE posture/endurance 4'/4' UBE  Posture/endurance  4'/4' 8' Lv2         Prone quad stretch nv NV 30\"x4          Bridge with band Blue nv NV NV          Thoracic spine rotation s/l    5''x15 ea 5\" x10 ea        TB face pulls      Red 2x10       TB shoulder ext   "    Green  2x10       TB rows at 45 degrees      Green  2x10                    Ther Activity                                       Gait Training                                       Modalities             HEP DB                              [email or web address]  Access Code: [email or web address]    Patient treated by BOUBACAR Oakley under direct supervision of Luke ROLANDT.

## 2024-09-20 ENCOUNTER — OFFICE VISIT (OUTPATIENT)
Dept: PHYSICAL THERAPY | Facility: CLINIC | Age: 34
End: 2024-09-20
Payer: COMMERCIAL

## 2024-09-20 DIAGNOSIS — M54.42 CHRONIC LEFT-SIDED LOW BACK PAIN WITH LEFT-SIDED SCIATICA: Primary | ICD-10-CM

## 2024-09-20 DIAGNOSIS — M54.2 CERVICALGIA: ICD-10-CM

## 2024-09-20 DIAGNOSIS — G89.29 CHRONIC LEFT-SIDED LOW BACK PAIN WITH LEFT-SIDED SCIATICA: Primary | ICD-10-CM

## 2024-09-20 PROCEDURE — 97110 THERAPEUTIC EXERCISES: CPT

## 2024-09-20 PROCEDURE — 97112 NEUROMUSCULAR REEDUCATION: CPT

## 2024-09-20 PROCEDURE — 97140 MANUAL THERAPY 1/> REGIONS: CPT

## 2024-09-20 NOTE — PROGRESS NOTES
"Daily Note     Today's date: 2024  Patient name: Meera Harvey  : 1990  MRN: [id number]  Referring provider: Leslie Dumas CRNP  Dx:   Encounter Diagnosis     ICD-10-CM    1. Chronic left-sided low back pain with left-sided sciatica  M54.42     G89.29       2. Cervicalgia  M54.2                      Subjective: pt states that she has a pinch pain in left side upper neck that just happened.  Overall though doing well and able to stand upright without having sxs into arms.       Objective: See treatment diary below      Assessment: Pt with moderate spasms in levator and rhomboid of left side that did release with TPR..Reviewed face pull with correction of hand placement and added B ER to program.  She is able to hold primal push ups longer without losing form therefore increased to 10 second reps.       Plan: Continue per plan of care.      Precautions: none      Manuals       Traction c/s DB LQ B/L LQ B/L DB DB        Scalene stm nv LQ B/L LQ B/L DB   TPR-UT,levator rhomboid-DL      T/s pa and rot mobs Grade 2 DB Grade 2-3 DB NV Broad bi, focused bi, focused uni grade 2-4 DB Uni PA grade 2-4  DB LF       Left QL and glute stm nv LQ B/L   LQ b/L          Sacral rocking nv            Neuro Re-Ed             Chin tuck with head lift nv X10, 5\" x10          Supine pnf on foam roller nv  NV          Supine H abd on foam roller nv 10\"x10 2x10   2x10       Qped chin tuck nv 10\"x10 2x10, 3\"    Seatede x10      B ER with retracton       Blue 2x10      Qped H abd nv  T-S ROT hand on neck  x15ea X5 ea 10x ea        Primal push up nv  X15, 3\"  10x 3\" 10x 3\" 10\"x5      Seated scapular depression     20x3\"        Ther Ex             Ellip for endurance Nv  UBE posture/endurance 2'/2' UBE posture/endurance 4'/4' UBE posture/endurance 4'/4' UBE  Posture/endurance  4'/4' 8' Lv2   Ellip lv2   8'      Prone quad stretch nv NV 30\"x4          Bridge with band Blue nv NV NV        " "  Thoracic spine rotation s/l    5''x15 ea 5\" x10 ea        TB face pulls      Red 2x10 Red 2x10      TB shoulder ext      Green  2x10       TB rows at 45 degrees      Green  2x10 2x10                   Ther Activity                                       Gait Training                                       Modalities             HEP DB                              [email or web address]  Access Code: [email or web address]    Patient treated by BOUBACAR Oakley under direct supervision of Luke Encarnacion PT. DPT.           "

## 2024-09-26 ENCOUNTER — OFFICE VISIT (OUTPATIENT)
Dept: PHYSICAL THERAPY | Facility: CLINIC | Age: 34
End: 2024-09-26
Payer: COMMERCIAL

## 2024-09-26 DIAGNOSIS — M54.2 CERVICALGIA: ICD-10-CM

## 2024-09-26 DIAGNOSIS — M54.42 CHRONIC LEFT-SIDED LOW BACK PAIN WITH LEFT-SIDED SCIATICA: Primary | ICD-10-CM

## 2024-09-26 DIAGNOSIS — G89.29 CHRONIC LEFT-SIDED LOW BACK PAIN WITH LEFT-SIDED SCIATICA: Primary | ICD-10-CM

## 2024-09-26 PROCEDURE — 97112 NEUROMUSCULAR REEDUCATION: CPT

## 2024-09-26 PROCEDURE — 97140 MANUAL THERAPY 1/> REGIONS: CPT

## 2024-09-26 PROCEDURE — 97110 THERAPEUTIC EXERCISES: CPT

## 2024-09-26 NOTE — PROGRESS NOTES
"Daily Note     Today's date: 2024  Patient name: Meera Harvey  : 1990  MRN: [id number]  Referring provider: Leslie Dumas CRNP  Dx:   Encounter Diagnosis     ICD-10-CM    1. Chronic left-sided low back pain with left-sided sciatica  M54.42     G89.29       2. Cervicalgia  M54.2                      Subjective: pt states that she has seen a lot of progress this past week with ROM and strength along with less pain.      Objective: See treatment diary below      Assessment: Tolerated treatment with most tightness noted left rhomboid region that did have releases made in the area.  Added quad MT with band and standing h abd with band.  Reviewed her prone plank on hands and elbow s for form.  . Patient is with increased ability to move through greater motion with exercises and better control.      Plan: Continue per plan of care.      Precautions: none      Manuals      Traction c/s DB LQ B/L LQ B/L DB DB        Scalene stm nv LQ B/L LQ B/L DB   TPR-UT,levator rhomboid-DL TPR rhomboid.      T/s pa and rot mobs Grade 2 DB Grade 2-3 DB NV Broad bi, focused bi, focused uni grade 2-4 DB Uni PA grade 2-4  DB LF       Left QL and glute stm nv LQ B/L   LQ b/L          Sacral rocking nv            Neuro Re-Ed             Chin tuck with head lift nv X10, 5\" x10          Supine pnf on foam roller nv  NV          Supine H abd on foam roller nv 10\"x10 2x10   2x10       Qped chin tuck nv 10\"x10 2x10, 3\"    Seatede x10      B ER with retracton       Blue 2x10 reviewed     Qped H abd nv  T-S ROT hand on neck  x15ea X5 ea 10x ea   Gtb x10   ea MT     Primal push up nv  X15, 3\"  10x 3\" 10x 3\" 10\"x5      Seated scapular depression     20x3\"        Ther Ex        Stand  ha bd gtb x10      Ellip for endurance Nv  UBE posture/endurance 2'/2' UBE posture/endurance 4'/4' UBE posture/endurance 4'/4' UBE  Posture/endurance  4'/4' 8' Lv2   Ellip lv2   8'      Prone quad stretch nv NV 30\"x4  " "        Bridge with band Blue nv NV NV          Thoracic spine rotation s/l    5''x15 ea 5\" x10 ea        TB face pulls      Red 2x10 Red 2x10      TB shoulder ext      Green  2x10       TB rows at 45 degrees      Green  2x10 2x10                   Ther Activity                                       Gait Training                                       Modalities             HEP DB                              [email or web address]  Access Code: [email or web address]    Patient treated by BOUBACAR Oakley under direct supervision of Luke Encarnacion PT. DPT.             "

## 2024-09-27 ENCOUNTER — APPOINTMENT (OUTPATIENT)
Dept: PHYSICAL THERAPY | Facility: CLINIC | Age: 34
End: 2024-09-27
Payer: COMMERCIAL

## 2024-10-03 ENCOUNTER — OFFICE VISIT (OUTPATIENT)
Dept: PHYSICAL THERAPY | Facility: CLINIC | Age: 34
End: 2024-10-03
Payer: COMMERCIAL

## 2024-10-03 DIAGNOSIS — M54.2 CERVICALGIA: ICD-10-CM

## 2024-10-03 DIAGNOSIS — M54.42 CHRONIC LEFT-SIDED LOW BACK PAIN WITH LEFT-SIDED SCIATICA: Primary | ICD-10-CM

## 2024-10-03 DIAGNOSIS — G89.29 CHRONIC LEFT-SIDED LOW BACK PAIN WITH LEFT-SIDED SCIATICA: Primary | ICD-10-CM

## 2024-10-03 PROCEDURE — 97110 THERAPEUTIC EXERCISES: CPT | Performed by: PHYSICAL THERAPIST

## 2024-10-03 PROCEDURE — 97140 MANUAL THERAPY 1/> REGIONS: CPT | Performed by: PHYSICAL THERAPIST

## 2024-10-03 NOTE — PROGRESS NOTES
"Daily Note     Today's date: 10/3/2024  Patient name: Meera Harvey  : 1990  MRN: [id number]  Referring provider: Leslie Dumas CRNP  Dx:   Encounter Diagnosis     ICD-10-CM    1. Chronic left-sided low back pain with left-sided sciatica  M54.42     G89.29       2. Cervicalgia  M54.2                      Subjective: pt notes that she is stiff and sore. She was doing a lot of driving as well as holding baby for 2 hours at a        Objective: See treatment diary below      Assessment: Pt did well with all exercises. Was able to advance band exercises, and add in bear crawl and t/s ext with fatigue only.       Plan: Continue per plan of care.      Precautions: none      Manuals 8/2 8/9 8/16 8/23 9/6 9/13 9/20 9/26 10/3    Traction c/s DB LQ B/L LQ B/L DB DB        Scalene stm nv LQ B/L LQ B/L DB   TPR-UT,levator rhomboid-DL TPR rhomboid.  DB    T/s pa and rot mobs Grade 2 DB Grade 2-3 DB NV Broad bi, focused bi, focused uni grade 2-4 DB Uni PA grade 2-4  DB LF   DB    Left QL and glute stm nv LQ B/L   LQ b/L          Sacral rocking nv            Neuro Re-Ed             Chin tuck with head lift nv X10, 5\" x10          Supine pnf on foam roller nv  NV          Supine H abd on foam roller nv 10\"x10 2x10   2x10       Qped chin tuck nv 10\"x10 2x10, 3\"    Seatede x10      B ER with retracton       Blue 2x10 reviewed     Qped H abd nv  T-S ROT hand on neck  x15ea X5 ea 10x ea   Gtb x10   ea MT Gtb 2x10 MT    Primal push up nv  X15, 3\"  10x 3\" 10x 3\" 10\"x5  Bear crawl12' x3 ea dir    Seated scapular depression     20x3\"        Ther Ex              Ellip for endurance Nv  UBE posture/endurance 2'/2' UBE posture/endurance 4'/4' UBE posture/endurance 4'/4' UBE  Posture/endurance  4'/4' 8' Lv2   Ellip lv2   8'  Ellip rom lv 2 5'    Foam roller thread needle         5''x10 ea    Bridge with band Blue nv NV NV          Thoracic spine rotation s/l    5''x15 ea 5\" x10 ea        TB face pulls      Red 2x10 Red " 2x10 Red 2x10 gtb    TB shoulder ext      Green  2x10       TB rows at 45 degrees      Green  2x10 2x10 Blue 90 deg 2x10  Blue 90 deg 2x10                  Ther Activity                                       Gait Training                                       Modalities             HEP DB                              [email or web address]  Access Code: [email or web address]

## 2024-10-08 ENCOUNTER — APPOINTMENT (OUTPATIENT)
Dept: PHYSICAL THERAPY | Facility: CLINIC | Age: 34
End: 2024-10-08
Payer: COMMERCIAL

## 2024-10-17 ENCOUNTER — OFFICE VISIT (OUTPATIENT)
Dept: PHYSICAL THERAPY | Facility: CLINIC | Age: 34
End: 2024-10-17
Payer: COMMERCIAL

## 2024-10-17 DIAGNOSIS — M54.2 CERVICALGIA: ICD-10-CM

## 2024-10-17 DIAGNOSIS — M54.42 CHRONIC LEFT-SIDED LOW BACK PAIN WITH LEFT-SIDED SCIATICA: Primary | ICD-10-CM

## 2024-10-17 DIAGNOSIS — G89.29 CHRONIC LEFT-SIDED LOW BACK PAIN WITH LEFT-SIDED SCIATICA: Primary | ICD-10-CM

## 2024-10-17 PROCEDURE — 97140 MANUAL THERAPY 1/> REGIONS: CPT

## 2024-10-17 PROCEDURE — 97110 THERAPEUTIC EXERCISES: CPT

## 2024-10-17 NOTE — PROGRESS NOTES
"Daily Note     Today's date: 10/17/2024  Patient name: Meera Harvey  : 1990  MRN: [id number]  Referring provider: Leslie Dumas CRNP  Dx:   Encounter Diagnosis     ICD-10-CM    1. Chronic left-sided low back pain with left-sided sciatica  M54.42     G89.29       2. Cervicalgia  M54.2                      Subjective: pt states that she noticed that she can look up at the onesimo with less pain in neck.       Objective: See treatment diary below      Assessment: Tolerated treatment with continued tightness in rhomboid but not as rigid.  . Patient continues to be compliant with HEP and is making progress with strength and motion.       Plan: Continue per plan of care.      Precautions: none      Manuals 8/2 8/9 8/16 8/23 9/6 9/13 9/20 9/26 10/3 10/17   Traction c/s DB LQ B/L LQ B/L DB DB        Scalene stm nv LQ B/L LQ B/L DB   TPR-UT,levator rhomboid-DL TPR rhomboid.  DB TPR-rhomboid /levator-DL   T/s pa and rot mobs Grade 2 DB Grade 2-3 DB NV Broad bi, focused bi, focused uni grade 2-4 DB Uni PA grade 2-4  DB LF   DB    Left QL and glute stm nv LQ B/L   LQ b/L          Sacral rocking nv            Neuro Re-Ed             Chin tuck with head lift nv X10, 5\" x10          Supine pnf on foam roller nv  NV          Supine H abd on foam roller nv 10\"x10 2x10   2x10       Qped chin tuck nv 10\"x10 2x10, 3\"    Seatede x10      B ER with retracton       Blue 2x10 reviewed     Qped H abd nv  T-S ROT hand on neck  x15ea X5 ea 10x ea   Gtb x10   ea MT Gtb 2x10 MT    Primal push up nv  X15, 3\"  10x 3\" 10x 3\" 10\"x5  Bear crawl12' x3 ea dir    Seated scapular depression     20x3\"        Ther Ex              Ellip for endurance Nv  UBE posture/endurance 2'/2' UBE posture/endurance 4'/4' UBE posture/endurance 4'/4' UBE  Posture/endurance  4'/4' 8' Lv2   Ellip lv2   8'  Ellip rom lv 2 5' Ellip lv2 8'   Foam roller thread needle         5''x10 ea 5\"x10   Bridge with band Blue nv NV NV          Thoracic spine rotation s/l    " "5''x15 ea 5\" x10 ea        TB face pulls      Red 2x10 Red 2x10 Red 2x10 gtb    TB shoulder ext      Green  2x10       TB rows at 45 degrees      Green  2x10 2x10 Blue 90 deg 2x10  Blue 90 deg 2x10                  Ther Activity                                       Gait Training                                       Modalities             HEP DB                              [email or web address]  Access Code: [email or web address]                   "

## 2024-10-28 ENCOUNTER — OFFICE VISIT (OUTPATIENT)
Dept: PHYSICAL THERAPY | Facility: CLINIC | Age: 34
End: 2024-10-28
Payer: COMMERCIAL

## 2024-10-28 DIAGNOSIS — G89.29 CHRONIC LEFT-SIDED LOW BACK PAIN WITH LEFT-SIDED SCIATICA: Primary | ICD-10-CM

## 2024-10-28 DIAGNOSIS — M54.42 CHRONIC LEFT-SIDED LOW BACK PAIN WITH LEFT-SIDED SCIATICA: Primary | ICD-10-CM

## 2024-10-28 DIAGNOSIS — M54.2 CERVICALGIA: ICD-10-CM

## 2024-10-28 PROCEDURE — 97110 THERAPEUTIC EXERCISES: CPT | Performed by: PHYSICAL THERAPIST

## 2024-10-28 PROCEDURE — 97112 NEUROMUSCULAR REEDUCATION: CPT | Performed by: PHYSICAL THERAPIST

## 2024-10-28 PROCEDURE — 97140 MANUAL THERAPY 1/> REGIONS: CPT | Performed by: PHYSICAL THERAPIST

## 2024-10-28 NOTE — PROGRESS NOTES
"Daily Note     Today's date: 10/28/2024  Patient name: Meera Harvey  : 1990  MRN: [id number]  Referring provider: Leslie Dumas CRNP  Dx:   Encounter Diagnosis     ICD-10-CM    1. Chronic left-sided low back pain with left-sided sciatica  M54.42     G89.29       2. Cervicalgia  M54.2                      Subjective: pt notes that she is doing well. She notes that she feels as if she does get symptoms that she is able to control them.       Objective: See treatment diary below      Assessment: pt did well with all exercises. She has been able to self reduce symptoms. She has been able to perform all exercises with out cues. Pt will be d/c'd at this time to [facility] but was advised to return if symptoms return       Plan: d/c at this time     Precautions: none      Manuals 8/2 8/9 8/16 8/23 9/6 9/13 9/20 9/26 10/3 10/17 10/28   Traction c/s DB LQ B/L LQ B/L DB DB         Scalene stm nv LQ B/L LQ B/L DB   TPR-UT,levator rhomboid-DL TPR rhomboid.  DB TPR-rhomboid /levator-DL DB   T/s pa and rot mobs Grade 2 DB Grade 2-3 DB NV Broad bi, focused bi, focused uni grade 2-4 DB Uni PA grade 2-4  DB LF   DB  DB   Left QL and glute stm nv LQ B/L   LQ b/L           Sacral rocking nv             Neuro Re-Ed              Chin tuck with head lift nv X10, 5\" x10           Supine pnf on foam roller nv  NV           Supine H abd on foam roller nv 10\"x10 2x10   2x10        Qped chin tuck nv 10\"x10 2x10, 3\"    Seatede x10       B ER with retracton       Blue 2x10 reviewed      Qped H abd nv  T-S ROT hand on neck  x15ea X5 ea 10x ea   Gtb x10   ea MT Gtb 2x10 MT  Blue BT 2x10   Primal push up nv  X15, 3\"  10x 3\" 10x 3\" 10\"x5  Bear crawl12' x3 ea dir  BC 12'x3 ea dir   Seated scapular depression     20x3\"         Ther Ex               Ellip for endurance Nv  UBE posture/endurance 2'/2' UBE posture/endurance 4'/4' UBE posture/endurance 4'/4' UBE  Posture/endurance  4'/4' 8' Lv2   Ellip lv2   8'  Ellip rom lv 2 5' Ellip lv2 8' " "Ellip lv 2 8'   Foam roller thread needle         5''x10 ea 5\"x10 5''x10   Bridge with band Blue nv NV NV           Thoracic spine rotation s/l    5''x15 ea 5\" x10 ea         TB face pulls      Red 2x10 Red 2x10 Red 2x10 gtb  Gtb 2x10   TB shoulder ext      Green  2x10        TB rows at 45 degrees      Green  2x10 2x10 Blue 90 deg 2x10  Blue 90 deg 2x10   Blue 2x10                 Ther Activity                                          Gait Training                                          Modalities              HEP NATALIIA wang.[employer]  Access Code: [email or web address]                     "

## 2024-11-22 ENCOUNTER — OFFICE VISIT (OUTPATIENT)
Dept: URGENT CARE | Facility: CLINIC | Age: 34
End: 2024-11-22
Payer: COMMERCIAL

## 2024-11-22 VITALS
TEMPERATURE: 98 F | HEART RATE: 74 BPM | DIASTOLIC BLOOD PRESSURE: 74 MMHG | SYSTOLIC BLOOD PRESSURE: 126 MMHG | OXYGEN SATURATION: 97 % | RESPIRATION RATE: 18 BRPM

## 2024-11-22 DIAGNOSIS — R05.1 ACUTE COUGH: ICD-10-CM

## 2024-11-22 DIAGNOSIS — H66.91 RIGHT OTITIS MEDIA, UNSPECIFIED OTITIS MEDIA TYPE: Primary | ICD-10-CM

## 2024-11-22 PROCEDURE — G0382 LEV 3 HOSP TYPE B ED VISIT: HCPCS | Performed by: PHYSICIAN ASSISTANT

## 2024-11-22 RX ORDER — AMOXICILLIN 875 MG/1
875 TABLET, COATED ORAL 2 TIMES DAILY
Qty: 20 TABLET | Refills: 0 | Status: SHIPPED | OUTPATIENT
Start: 2024-11-22 | End: 2024-12-02

## 2024-11-22 NOTE — PATIENT INSTRUCTIONS
Patient was educated on antibiotics. Patient was told to eat on antibiotics. Any worsening of symptoms follow up with PCP.

## 2024-11-22 NOTE — PROGRESS NOTES
[facility]        NAME: Meera Harvey is a 34 y.o. female  : 1990    MRN: [id number]  DATE: 2024  TIME: 11:22 AM    Assessment and Plan   Right otitis media, unspecified otitis media type [H66.91]  1. Right otitis media, unspecified otitis media type  amoxicillin (AMOXIL) 875 mg tablet      2. Acute cough  amoxicillin (AMOXIL) 875 mg tablet            Patient Instructions   Patient was educated on antibiotics. Patient was told to eat on antibiotics. Any worsening of symptoms follow up with PCP.    Follow up with PCP in 3-5 days.  Proceed to  ER if symptoms worsen.    If tests have been performed at [facility], our office will contact you with results if changes need to be made to the care plan discussed with you at the visit.  [facility]    Chief Complaint     Chief Complaint   Patient presents with    Ear Drainage     Patient has had nasal congestion and right ear fullness x5 days, 3 days ago started to awake in the morning with ear drainage on her pillow.          History of Present Illness       Patient is a pleasant 34 year old female who presents today with right ear pain with discharge, cough , and congestion for 3 days. Patient denies any history of asthma or diabetes. Denies any allergies to medications. Patient is currently breast feeding    Ear Drainage   Associated symptoms include coughing.       Review of Systems   Review of Systems   Constitutional: Negative.    HENT:  Positive for congestion, ear pain and voice change.    Respiratory:  Positive for cough.    Cardiovascular: Negative.    Psychiatric/Behavioral: Negative.           Current Medications       Current Outpatient Medications:     amoxicillin (AMOXIL) 875 mg tablet, Take 1 tablet (875 mg total) by mouth 2 (two) times a day for 10 days, Disp: 20 tablet, Rfl: 0    norethindrone (Ortho Micronor) 0.35 MG tablet, Take 1 tablet (0.35 mg total) by mouth daily, Disp:  84 tablet, Rfl: 4    SUMAtriptan (IMITREX) 100 mg tablet, TAKE 1 TABLET (100 MG TOTAL) BY MOUTH ONCE AS NEEDED FOR MIGRAINE TAKE 1 AT HEADACHE ONSET AND THEN 1 ADDITIONAL TABLET 2 HOURS LATER IF NOT IMPROVED. MAX 2 PER 24 HOURS., Disp: 12 tablet, Rfl: 1    Current Allergies     Allergies as of 11/22/2024    (No Known Allergies)            The following portions of the patient's history were reviewed and updated as appropriate: allergies, current medications, past family history, past medical history, past social history, past surgical history and problem list.     Past Medical History:   Diagnosis Date    Abnormal Pap smear of cervix     just follow up [id number] abnormal since    Arthralgia of hip 07/17/2024    Arthralgia of knee 07/17/2024    Cervical intraepithelial neoplasia grade 1 07/17/2024    Sep 23, 2013 Entered By: AMANDA TRAMMELL Comment: pap 2012      Dry eye syndrome 07/17/2024    Ear problems 04/19/1990    Multiple ear tubes as child    Head injury 07/17/2024    Headache(784.0) 11/01/2011    Hematuria syndrome 07/17/2024    Migraine     seasonal-food related-last episode 4/2023. No neuro work up. Resolve with tylenol/advil    Nasal congestion 11/01/2011    Sinusitis 11/01/2011    UTI (urinary tract infection)     Last episode 2020    Varicella     chicken pox as child       History reviewed. No pertinent surgical history.    Family History   Problem Relation Age of Onset    Hypertension Mother     Depression Mother     Diverticulitis Father     No Known Problems Brother     Cancer Maternal Grandmother     Pancreatic cancer Maternal Grandmother     Dementia Maternal Grandfather     Heart disease Paternal Grandmother     Diabetes Paternal Grandmother     Hypertension Paternal Grandmother     Hyperlipidemia Paternal Grandmother     Asthma Paternal Grandmother     Heart disease Paternal Grandfather     Diabetes Paternal Grandfather     Hypertension Paternal Grandfather     Hyperlipidemia Paternal  Grandfather     Asthma Paternal Grandfather     Breast cancer Neg Hx     Ovarian cancer Neg Hx     Uterine cancer Neg Hx     Colon cancer Neg Hx          Medications have been verified.        Objective   /74   Pulse 74   Temp 98 °F (36.7 °C)   Resp 18   SpO2 97%   No LMP recorded.       Physical Exam     Physical Exam  Vitals and nursing note reviewed.   HENT:      Head: Normocephalic.      Left Ear: Tympanic membrane, ear canal and external ear normal.      Ears:      Comments: RIGHT TM is mildly red and bulging.      Mouth/Throat:      Mouth: Mucous membranes are moist.      Comments: PND  Eyes:      Pupils: Pupils are equal, round, and reactive to light.   Cardiovascular:      Rate and Rhythm: Normal rate and regular rhythm.      Heart sounds: Normal heart sounds.   Pulmonary:      Breath sounds: Normal breath sounds.   Neurological:      General: No focal deficit present.      Mental Status: She is alert and oriented to person, place, and time.   Psychiatric:         Mood and Affect: Mood normal.         Behavior: Behavior normal.

## 2025-01-30 ENCOUNTER — OFFICE VISIT (OUTPATIENT)
Age: 35
End: 2025-01-30
Payer: COMMERCIAL

## 2025-01-30 VITALS
WEIGHT: 160 LBS | SYSTOLIC BLOOD PRESSURE: 132 MMHG | HEART RATE: 63 BPM | DIASTOLIC BLOOD PRESSURE: 80 MMHG | BODY MASS INDEX: 25.11 KG/M2 | OXYGEN SATURATION: 100 % | TEMPERATURE: 97.7 F | HEIGHT: 67 IN

## 2025-01-30 DIAGNOSIS — L71.9 ROSACEA: ICD-10-CM

## 2025-01-30 DIAGNOSIS — Z13.89 SCREENING FOR SKIN CONDITION: Primary | ICD-10-CM

## 2025-01-30 DIAGNOSIS — D18.01 CHERRY ANGIOMA: ICD-10-CM

## 2025-01-30 DIAGNOSIS — D22.9 MULTIPLE NEVI: ICD-10-CM

## 2025-01-30 PROCEDURE — 99204 OFFICE O/P NEW MOD 45 MIN: CPT | Performed by: STUDENT IN AN ORGANIZED HEALTH CARE EDUCATION/TRAINING PROGRAM

## 2025-01-30 NOTE — PATIENT INSTRUCTIONS
"MELANOCYTIC NEVI (\"Moles\")    Paste patient specific assessment and plan here     Melanocytic nevi (\"moles\") are tan or brown, raised or flat areas of the skin which have an increased number of melanocytes. Melanocytes are the cells in our body which make pigment and account for skin color.    Some moles are present at birth (I.e., \"congenital nevi\"), while others come up later in life (i.e., \"acquired nevi\").  The sun can stimulate the body to make more moles.  Sunburns are not the only thing that triggers more moles.  Chronic sun exposure can do it too.     Clinically distinguishing a healthy mole from melanoma may be difficult, even for experienced dermatologists. The \"ABCDE's\" of moles have been suggested as a means of helping to alert a person to a suspicious mole and the possible increased risk of melanoma.  The suggestions for raising alert are as follows:    Asymmetry: Healthy moles tend to be symmetric, while melanomas are often asymmetric.  Asymmetry means if you draw a line through the mole, the two halves do not match in color, size, shape, or surface texture. Asymmetry can be a result of rapid enlargement of a mole, the development of a raised area on a previously flat lesion, scaling, ulceration, bleeding or scabbing within the mole.  Any mole that starts to demonstrate \"asymmetry\" should be examined promptly by a board certified dermatologist.     Border: Healthy moles tend to have discrete, even borders.  The border of a melanoma often blends into the normal skin and does not sharply delineate the mole from normal skin.  Any mole that starts to demonstrate \"uneven borders\" should be examined promptly by a board certified dermatologist.     Color: Healthy moles tend to be one color throughout.  Melanomas tend to be made up of different colors ranging from dark black, blue, white, or red.  Any mole that demonstrates a color change should be examined promptly by a board certified dermatologist. " "    Diameter: Healthy moles tend to be smaller than 0.6 cm in size; an exception are \"congenital nevi\" that can be larger.  Melanomas tend to grow and can often be greater than 0.6 cm (1/4 of an inch, or the size of a pencil eraser). This is only a guideline, and many normal moles may be larger than 0.6 cm without being unhealthy.  Any mole that starts to change in size (small to bigger or bigger to smaller) should be examined promptly by a board certified dermatologist.     Evolving: Healthy moles tend to \"stay the same.\"  Melanomas may often show signs of change or evolution such as a change in size, shape, color, or elevation.  Any mole that starts to itch, bleed, crust, burn, hurt, or ulcerate or demonstrate a change or evolution should be examined promptly by a board certified dermatologist.      Dysplastic Nevi  Dysplastic moles are moles that fit the ABCDE rules of melanoma but are not identified as melanomas when examined under the microscope.  They may indicate an increased risk of melanoma in that person. If there is a family history of melanoma, most experts agree that the person may be at an increased risk for developing a melanoma.  Experts still do not agree on what dysplastic moles mean in patients without a personal or family history of melanoma.  Dysplastic moles are usually larger than common moles and have different colors within it with irregular borders. The appearance can be very similar to a melanoma. Biopsies of dysplastic moles may show abnormalities which are different from a regular mole.      Melanoma  Malignant melanoma is a type of skin cancer that can be deadly if it spreads throughout the body. The incidence of melanoma in the [place] is growing faster than any other cancer. Melanoma usually grows near the surface of the skin for a period of time, and then begins to grow deeper into the skin. Once it grows deeper into the skin, the risk of spread to other organs greatly " "increases. Therefore, early detection and removal of a malignant melanoma may result in a better chance at a complete cure; removal after the tumor has spread may not be as effective, leading to worse clinical outcomes such as death.    The true rate of nevus transformation into a melanoma is unknown. It has been estimated that the lifetime risk for any acquired melanocytic nevus on any 20-year-old individual transforming into melanoma by age 80 is 0.03% (1 in 3,164) for men and 0.009% (1 in 10,800) for women.     The appearance of a \"new mole\" remains one of the most reliable methods for identifying a malignant melanoma.  Occasionally, melanomas appear as rapidly growing, blue-black, dome-shaped bumps within a previous mole or previous area of normal skin.  Other times, melanomas are suspected when a mole suddenly appears or changes. Itching, burning, or pain in a pigmented lesion should increase suspicion, but most patients with early melanoma have no skin discomfort whatsoever.  Melanoma can occur anywhere on the skin, including areas that are difficult for self-examination. Many melanomas are first noticed by other family members.  Suspicious-looking moles may be removed for microscopic examination.       You may be able to prevent death from melanoma by doing two simple things:    Try to avoid unnecessary sun exposure and protect your skin when it is exposed to the sun.  People who live near the equator, people who have intermittent exposures to large amounts of sun, and people who have had sunburns in childhood or adolescence have an increased risk for melanoma. Sun sense and vigilant sun protection may be keys to helping to prevent melanoma.  We recommend wearing UPF-rated sun protective clothing and sunglasses whenever possible and applying a moisturizer-sunscreen combination product (SPF 50+) such as Neutrogena Daily Defense to sun exposed areas of skin at least three times a day.    Have your moles " "regularly examined by a board certified dermatologist AND by yourself or a family member/friend at home.  We recommend that you have your moles examined at least once a year by a board certified dermatologist.  Use your birthday as an annual reminder to have your \"Birthday Suit\" (I.e., your skin) examined; it is a nice birthday gift to yourself to know that your skin is healthy appearing!  Additionally, at-home self examinations may be helpful for detecting a possible melanoma.  Use the ABCDEs we discussed and check your moles once a month at home.        SEBORRHEIC KERATOSIS  A seborrheic keratosis is a harmless warty spot that appears during adult life as a common sign of skin aging.  Seborrheic keratoses can arise on any area of skin, covered or uncovered, with the usual exception of the palms and soles. They do not arise from mucous membranes. Seborrheic keratoses can have highly variable appearance.      Seborrheic keratoses are extremely common. It has been estimated that over 90% of adults over the age of 60 years have one or more of them. They occur in males and females of all races, typically beginning to erupt in the 30s or 40s. They are uncommon under the age of 20 years.  The precise cause of seborrhoeic keratoses is not known.  Seborrhoeic keratoses are considered degenerative in nature. As time goes by, seborrheic keratoses tend to become more numerous. Some people inherit a tendency to develop a very large number of them; some people may have hundreds of them.    The name \"seborrheic keratosis\" is misleading, because these lesions are not limited to a seborrhoeic distribution (scalp, mid-face, chest, upper back), nor are they formed from sebaceous glands, nor are they associated with sebum -- which is greasy.  Seborrheic keratosis may also be called \"SK,\" \"Seb K,\" \"basal cell papilloma,\" \"senile wart,\" or \"barnacle.\"      There is no easy way to remove multiple lesions on a single occasion.  Unless a " "specific lesion is \"inflamed\" and is causing pain or stinging/burning or is bleeding, most insurance companies do not authorize treatment.      ANGIOMA (\"CHERRY ANGIOMA\")  Hamm angiomas markedly increase in number from about the age of 40, so it has been estimated that 75% of people over 75 years of age have them. Although they also called \"senile angiomas,\" they can occur in young people too - 5% of adolescents have been found to have them.     Cherry angiomas are very common in males and females of any age or race, with no difference in sexes or races affected. They are however more noticeable in white skin than in skin of colour.  There may be a family history of similar lesions. Eruptive (very large number appearing in a short period of time) cherry angiomas have been rarely reported to be associated with internal malignancy and pregnancy.     ROSACEA  Assessment and Plan:  Based on a thorough discussion of this condition and the management approach to it (including a comprehensive discussion of the known risks, side effects and potential benefits of treatment), the patient (family) agrees to implement the following specific plan:  Discussed PDL laser therapy at [facility]    Rosacea is a chronic rash affecting the mid-face including the nose, cheeks, chin, forehead, and eyelids. The incidence is usually greatest between the ages of 30-60 years and is more common in people with fair skin. Common characteristics include redness, telangiectasias, papules and pustules over affected areas. Rosacea may look similar to acne, but there is a lack of comedones. Occasionally the eyes may also be involved in ocular rosacea. In advanced disease, enlargement of the sebaceous glands in the nose, termed rhinophyma, may be present.     Rosacea results in red spots (papules) and sometimes pustules over the face, but unlike acne there are no blackheads, whiteheads, or cystic nodules. Patients often experience increased facial " flushing with prominent blood vessels (erythematotelangiectatic rosacea) and dry, sensitive skin. These symptoms are exacerbated by sun exposure, hot or spicy foods, topical steroids and oil-based facial products.     In ocular rosacea, eyelids may be red and sore due to conjunctivitis, keratitis, and episcleritis. If rhinophyma develops due to enlargement of sebaceous glands, the patient may have an enlarged and irregularly shaped nose with prominent pores. In rosacea that is refractory to treatment, patients can develop persistent redness and swelling of the face due to lymphatic obstruction (Morbihan disease).     Distribution around the cheeks may be confused with the malar or “butterfly rash” of lupus. However, the rash of lupus spares the nasal creases and lacks papules and pustules. If signs of photosensitivity, oral ulcers, arthritis, and kidney dysfunction are present then consider referral to a rheumatologist.     There are many potential causes of rosacea including genetic, environmental, vascular, and inflammatory factors. These include, but are not limited to:  Chronic exposure to ultraviolet radiation   Increased immune responses in the form of cathelicidins that promote vessel dilation and infiltration with white blood cells (neutrophils) into the dermis  Increased matrix metalloproteinases such as collagen and elastase that remodel normal tissue may contribute to inflammation of the skin making it thicker and harder  There is some evidence to suggest that increased numbers of demodex mites on patient skin may contribute to rosacea papules     General Treatment Approach   Avoid exacerbating factors such as heat, spicy foods, and alcohol   Use daily SPF30+ sunscreen and other methods of coverage for sun protection  Use water-based make-up   Avoid applying topical steroids to affected areas as they can cause perioral dermatitis and exacerbate rosacea     Topical Treatment Approach  Metronidazole cream  or gel by itself or in combination with oral antibiotics for more severe cases  Azelaic acid cream or lotion is effective for mild inflammatory rosacea when applied twice daily to affected areas  Brimonidine gel and oxymetazoline hydrochloride cream can reduce facial redness temporarily   Ivermectin cream can treat papulopustular rosacea by controlling demodex mites and inflammation   Pimecrolimus cream or tacrolimus ointment twice a day for 2-3 months can help reduce inflammation    Oral Treatment Approach  Antibiotics such as doxycycline, minocycline, or erythromycin for 1-3 months  Clonidine and carvedilol can help reduce facial flushing and are generally well tolerated. Common side effects include low blood pressure, gastrointestinal upset, dry eyes, blurred vision and low heart rate.   Isotretinoin at low doses can be effective for long term treatment when antibiotics fail. Side effects may make it unsuitable for some patients.   NSAIDs such as diclofenac can help reduce discomfort and redness in the skin.     Procedural/Surgical Treatment Approach   Vascular lasers or intense pulsed light treatment may be used to treat persistent telangiectasia and papulopustular rosacea  Plastic surgery and carbon dioxide lasers may be used to treat rhinophym

## 2025-01-30 NOTE — PROGRESS NOTES
"[facility] Note     Patient Name: Meera Harvey  Encounter Date: January 30, 2025     Have you been cared for by a [facility] Dermatologist in the last 3 years and, if so, which description applies to you?    NO.   I am considered a \"new\" patient and must complete all patient intake questions. I am FEMALE/of child-bearing potential.    REVIEW OF SYSTEMS:  Have you recently had or currently have any of the following? Recent fever or chills? No  Any non-healing wound? No  Are you pregnant or planning to become pregnant? No  Are you currently or planning to be nursing or breast feeding? YES, breastfeeding   PAST MEDICAL HISTORY:  Have you personally ever had or currently have any of the following?  If \"YES,\" then please provide more detail. Skin cancer (such as Melanoma, Basal Cell Carcinoma, Squamous Cell Carcinoma?  No  Tuberculosis, HIV/AIDS, Hepatitis B or C: No  Radiation Treatment No   HISTORY OF IMMUNOSUPPRESSION:   Do you have a history of any of the following:  Systemic Immunosuppression such as Diabetes, Biologic or Immunotherapy, Chemotherapy, Organ Transplantation, Bone Marrow Transplantation or Prednsione?  No    Answering \"YES\" requires the addition of the dotphrase \"IMMUNOSUPPRESSED\" as the first diagnosis of the patient's visit.   FAMILY HISTORY:  Any \"first degree relatives\" (parent, brother, sister, or child) with the following?    Skin Cancer, Pancreatic or Other Cancer? No   PATIENT EXPERIENCE:    Do you want the Dermatologist to perform a COMPLETE skin exam today including a clinical examination under the \"bra and underwear\" areas?  Yes  If necessary, do we have your permission to call and leave a detailed message on your Preferred Phone number that includes your specific medical information?  Yes      No Known Allergies   Current Outpatient Medications:   •  norethindrone (Ortho Micronor) 0.35 MG tablet, Take 1 tablet (0.35 mg total) by mouth daily, Disp: 84 tablet, Rfl: " "4  •  SUMAtriptan (IMITREX) 100 mg tablet, TAKE 1 TABLET (100 MG TOTAL) BY MOUTH ONCE AS NEEDED FOR MIGRAINE TAKE 1 AT HEADACHE ONSET AND THEN 1 ADDITIONAL TABLET 2 HOURS LATER IF NOT IMPROVED. MAX 2 PER 24 HOURS., Disp: 12 tablet, Rfl: 1          Whom besides the patient is providing clinical information about today's encounter?   NO ADDITIONAL HISTORIAN (patient alone provided history)    Physical Exam and Assessment/Plan by Diagnosis:    Chief complaint: patient is a 35 y/o female present for a routine skin exam. Patient has a spot of concern on her back.      MELANOCYTIC NEVI (\"Moles\")    Physical Exam:  Anatomic Location Affected: Mostly on sun-exposed areas of the trunk and extremities  Morphological Description:  Scattered, 1-4mm round to ovoid, symmetrical-appearing, even bordered, skin colored to dark brown macules/papules, mostly in sun-exposed areas  Pertinent Positives:  Pertinent Negatives:    Additional History of Present Condition:  present on exam    Assessment and Plan:  Based on a thorough discussion of this condition and the management approach to it (including a comprehensive discussion of the known risks, side effects and potential benefits of treatment), the patient (family) agrees to implement the following specific plan:  Provided handout with information regarding the ABCDE's of moles   Recommend routine skin exams every year   Sun avoidance, protective clothing (known as UPF clothing), and the use of at least SPF 30 sunscreens is advised. Sunscreen should be reapplied every two hours when outside.     ANGIOMA (\"CHERRY ANGIOMA\")    Physical Exam:  Anatomic Location: scattered across sun exposed areas of the trunk and extremities   Morphologic Description: Firm red to reddish-blue discrete papules  Pertinent Positives:  Pertinent Negatives:    Additional History of Present Condition:  Present on exam.     Assessment and Plan:  Reassured benign    ROSACEA    Physical Exam:  Anatomic Location " Affected:  face  Morphological Description: erythematous and telangiectatic patches  Pertinent Positives:  Pertinent Negatives:    Additional History of Present Condition:  present on exam    Assessment and Plan:  Based on a thorough discussion of this condition and the management approach to it (including a comprehensive discussion of the known risks, side effects and potential benefits of treatment), the patient (family) agrees to implement the following specific plan:  Discussed PDL laser therapy at [facility] place referral  Also discussed topical treatments such as Rhofade which may be helpful in the short term but not for everyday use    Rosacea is a chronic rash affecting the mid-face including the nose, cheeks, chin, forehead, and eyelids. The incidence is usually greatest between the ages of 30-60 years and is more common in people with fair skin. Common characteristics include redness, telangiectasias, papules and pustules over affected areas. Rosacea may look similar to acne, but there is a lack of comedones. Occasionally the eyes may also be involved in ocular rosacea. In advanced disease, enlargement of the sebaceous glands in the nose, termed rhinophyma, may be present.     Rosacea results in red spots (papules) and sometimes pustules over the face, but unlike acne there are no blackheads, whiteheads, or cystic nodules. Patients often experience increased facial flushing with prominent blood vessels (erythematotelangiectatic rosacea) and dry, sensitive skin. These symptoms are exacerbated by sun exposure, hot or spicy foods, topical steroids and oil-based facial products.     In ocular rosacea, eyelids may be red and sore due to conjunctivitis, keratitis, and episcleritis. If rhinophyma develops due to enlargement of sebaceous glands, the patient may have an enlarged and irregularly shaped nose with prominent pores. In rosacea that is refractory to treatment, patients can develop persistent redness  and swelling of the face due to lymphatic obstruction (Morbihan disease).     Distribution around the cheeks may be confused with the malar or “butterfly rash” of lupus. However, the rash of lupus spares the nasal creases and lacks papules and pustules. If signs of photosensitivity, oral ulcers, arthritis, and kidney dysfunction are present then consider referral to a rheumatologist.     There are many potential causes of rosacea including genetic, environmental, vascular, and inflammatory factors. These include, but are not limited to:  Chronic exposure to ultraviolet radiation   Increased immune responses in the form of cathelicidins that promote vessel dilation and infiltration with white blood cells (neutrophils) into the dermis  Increased matrix metalloproteinases such as collagen and elastase that remodel normal tissue may contribute to inflammation of the skin making it thicker and harder  There is some evidence to suggest that increased numbers of demodex mites on patient skin may contribute to rosacea papules     General Treatment Approach   Avoid exacerbating factors such as heat, spicy foods, and alcohol   Use daily SPF30+ sunscreen and other methods of coverage for sun protection  Use water-based make-up   Avoid applying topical steroids to affected areas as they can cause perioral dermatitis and exacerbate rosacea     Topical Treatment Approach  Metronidazole cream or gel by itself or in combination with oral antibiotics for more severe cases  Azelaic acid cream or lotion is effective for mild inflammatory rosacea when applied twice daily to affected areas  Brimonidine gel and oxymetazoline hydrochloride cream can reduce facial redness temporarily   Ivermectin cream can treat papulopustular rosacea by controlling demodex mites and inflammation   Pimecrolimus cream or tacrolimus ointment twice a day for 2-3 months can help reduce inflammation    Oral Treatment Approach  Antibiotics such as doxycycline,  minocycline, or erythromycin for 1-3 months  Clonidine and carvedilol can help reduce facial flushing and are generally well tolerated. Common side effects include low blood pressure, gastrointestinal upset, dry eyes, blurred vision and low heart rate.   Isotretinoin at low doses can be effective for long term treatment when antibiotics fail. Side effects may make it unsuitable for some patients.   NSAIDs such as diclofenac can help reduce discomfort and redness in the skin.     Procedural/Surgical Treatment Approach   Vascular lasers or intense pulsed light treatment may be used to treat persistent telangiectasia and papulopustular rosacea  Plastic surgery and carbon dioxide lasers may be used to treat rhinophyma      Scribe Attestation    I,:  Pam Harris am acting as a scribe while in the presence of the attending physician.:       I,:  Luke Pro DO personally performed the services described in this documentation    as scribed in my presence.:         Mellisa Cuba MD  Dermatology, PGY-2

## 2025-03-23 ENCOUNTER — HOSPITAL ENCOUNTER (EMERGENCY)
Facility: HOSPITAL | Age: 35
Discharge: HOME | End: 2025-03-23
Attending: EMERGENCY MEDICINE
Payer: COMMERCIAL

## 2025-03-23 VITALS
RESPIRATION RATE: 18 BRPM | HEART RATE: 60 BPM | SYSTOLIC BLOOD PRESSURE: 126 MMHG | OXYGEN SATURATION: 98 % | DIASTOLIC BLOOD PRESSURE: 74 MMHG | TEMPERATURE: 98 F

## 2025-03-23 DIAGNOSIS — N39.0 URINARY TRACT INFECTION: Primary | ICD-10-CM

## 2025-03-23 DIAGNOSIS — B37.31 YEAST VAGINITIS: ICD-10-CM

## 2025-03-23 LAB
BACTERIA UR QL AUTO: ABNORMAL /HPF
BILIRUB UR QL STRIP: NEGATIVE
CLARITY UR: ABNORMAL
COLOR UR: YELLOW
EXT PREGNANCY TEST URINE: NEGATIVE
EXT. CONTROL: NORMAL
GLUCOSE UR STRIP-MCNC: NEGATIVE MG/DL
HGB UR QL STRIP.AUTO: NEGATIVE
KETONES UR STRIP-MCNC: NEGATIVE MG/DL
LEUKOCYTE ESTERASE UR QL STRIP: ABNORMAL
NITRITE UR QL STRIP: NEGATIVE
NON-SQ EPI CELLS URNS QL MICRO: ABNORMAL /HPF
PH UR STRIP.AUTO: 6.5 [PH]
PROT UR STRIP-MCNC: NEGATIVE MG/DL
RBC #/AREA URNS AUTO: ABNORMAL /HPF
SP GR UR STRIP.AUTO: 1.01 (ref 1–1.03)
UROBILINOGEN UR STRIP-ACNC: <2 MG/DL
WBC #/AREA URNS AUTO: ABNORMAL /HPF

## 2025-03-23 PROCEDURE — 87086 URINE CULTURE/COLONY COUNT: CPT | Performed by: EMERGENCY MEDICINE

## 2025-03-23 PROCEDURE — 99284 EMERGENCY DEPT VISIT MOD MDM: CPT | Performed by: EMERGENCY MEDICINE

## 2025-03-23 PROCEDURE — 81001 URINALYSIS AUTO W/SCOPE: CPT | Performed by: EMERGENCY MEDICINE

## 2025-03-23 PROCEDURE — 87106 FUNGI IDENTIFICATION YEAST: CPT | Performed by: EMERGENCY MEDICINE

## 2025-03-23 PROCEDURE — 81025 URINE PREGNANCY TEST: CPT | Performed by: EMERGENCY MEDICINE

## 2025-03-23 PROCEDURE — 99283 EMERGENCY DEPT VISIT LOW MDM: CPT

## 2025-03-23 RX ORDER — PHENAZOPYRIDINE HYDROCHLORIDE 200 MG/1
200 TABLET, FILM COATED ORAL
Qty: 10 TABLET | Refills: 0 | Status: SHIPPED | OUTPATIENT
Start: 2025-03-23 | End: 2025-03-28 | Stop reason: ALTCHOICE

## 2025-03-23 RX ORDER — CEPHALEXIN 500 MG/1
500 CAPSULE ORAL EVERY 12 HOURS SCHEDULED
Qty: 14 CAPSULE | Refills: 0 | Status: SHIPPED | OUTPATIENT
Start: 2025-03-23 | End: 2025-03-30

## 2025-03-23 RX ORDER — PHENAZOPYRIDINE HYDROCHLORIDE 100 MG/1
100 TABLET, FILM COATED ORAL ONCE
Status: COMPLETED | OUTPATIENT
Start: 2025-03-23 | End: 2025-03-23

## 2025-03-23 RX ADMIN — CEPHALEXIN 500 MG: 250 CAPSULE ORAL at 05:47

## 2025-03-23 RX ADMIN — PHENAZOPYRIDINE HYDROCHLORIDE 100 MG: 100 TABLET ORAL at 05:47

## 2025-03-23 NOTE — ED PROVIDER NOTES
Time reflects when diagnosis was documented in both MDM as applicable and the Disposition within this note       Time User Action Codes Description Comment    3/23/2025  5:41 AM Aura Rucker [N39.0] Urinary tract infection           ED Disposition       ED Disposition   Discharge    Condition   Stable    Date/Time   Sun Mar 23, 2025  5:41 AM    Comment   Meera Harvey discharge to home/self care.                   Assessment & Plan       Medical Decision Making  34-year-old female with evaluation of urinary frequency UA to evaluate for infection otherwise no fever no nausea no vomiting no flank pain low suspicion for pyelonephritis, complicated cystitis.      Amount and/or Complexity of Data Reviewed  Labs: ordered.    Risk  Prescription drug management.             Medications   cephalexin (KEFLEX) capsule 500 mg (500 mg Oral Given 3/23/25 0547)   phenazopyridine (PYRIDIUM) tablet 100 mg (100 mg Oral Given 3/23/25 0547)       ED Risk Strat Scores                            SBIRT 20yo+      Flowsheet Row Most Recent Value   Initial Alcohol Screen: US AUDIT-C     1. How often do you have a drink containing alcohol? 0 Filed at: 03/23/2025 0508   2. How many drinks containing alcohol do you have on a typical day you are drinking?  0 Filed at: 03/23/2025 0508   3a. Male UNDER 65: How often do you have five or more drinks on one occasion? 0 Filed at: 03/23/2025 0508   3b. FEMALE Any Age, or MALE 65+: How often do you have 4 or more drinks on one occassion? 0 Filed at: 03/23/2025 0508   Audit-C Score 0 Filed at: 03/23/2025 0508   TONY: How many times in the past year have you...    Used an illegal drug or used a prescription medication for non-medical reasons? Never Filed at: 03/23/2025 0508                            History of Present Illness       Chief Complaint   Patient presents with    Possible UTI     Pt report she thinks she has a bladder infection, pt reports she has been feeling the urge to pee  constantly for the past few days       Past Medical History:   Diagnosis Date    Abnormal Pap smear of cervix     just follow up [id number] abnormal since    Arthralgia of hip 07/17/2024    Arthralgia of knee 07/17/2024    Cervical intraepithelial neoplasia grade 1 07/17/2024    Sep 23, 2013 Entered By: AMANDA TRAMMELL Comment: pap 2012      Dry eye syndrome 07/17/2024    Ear problems 04/19/1990    Multiple ear tubes as child    Head injury 07/17/2024    Headache(784.0) 11/01/2011    Hematuria syndrome 07/17/2024    Migraine     seasonal-food related-last episode 4/2023. No neuro work up. Resolve with tylenol/advil    Nasal congestion 11/01/2011    Sinusitis 11/01/2011    UTI (urinary tract infection)     Last episode 2020    Varicella     chicken pox as child      History reviewed. No pertinent surgical history.   Family History   Problem Relation Age of Onset    Hypertension Mother     Depression Mother     Diverticulitis Father     No Known Problems Brother     Cancer Maternal Grandmother     Pancreatic cancer Maternal Grandmother     Dementia Maternal Grandfather     Heart disease Paternal Grandmother     Diabetes Paternal Grandmother     Hypertension Paternal Grandmother     Hyperlipidemia Paternal Grandmother     Asthma Paternal Grandmother     Heart disease Paternal Grandfather     Diabetes Paternal Grandfather     Hypertension Paternal Grandfather     Hyperlipidemia Paternal Grandfather     Asthma Paternal Grandfather     Breast cancer Neg Hx     Ovarian cancer Neg Hx     Uterine cancer Neg Hx     Colon cancer Neg Hx       Social History     Tobacco Use    Smoking status: Never     Passive exposure: Never    Smokeless tobacco: Never   Vaping Use    Vaping status: Never Used   Substance Use Topics    Alcohol use: Not Currently    Drug use: Never      E-Cigarette/Vaping    E-Cigarette Use Never User       E-Cigarette/Vaping Substances    Nicotine No     THC No     CBD No     Flavoring No     Other No      Unknown No       I have reviewed and agree with the history as documented.     34-year-old female presents for evaluation of several days of hematuria no fevers, no nausea no vomiting no back pain.        Review of Systems   Constitutional:  Negative for appetite change and fever.   HENT:  Negative for rhinorrhea and sore throat.    Eyes:  Negative for photophobia and visual disturbance.   Respiratory:  Negative for cough, chest tightness and wheezing.    Cardiovascular:  Negative for chest pain, palpitations and leg swelling.   Gastrointestinal:  Negative for abdominal distention, abdominal pain, blood in stool, constipation and diarrhea.   Genitourinary:  Positive for frequency. Negative for dysuria, flank pain, hematuria and urgency.   Musculoskeletal:  Negative for back pain.   Skin:  Negative for rash.   Neurological:  Negative for dizziness, weakness and headaches.   All other systems reviewed and are negative.          Objective       ED Triage Vitals [03/23/25 0509]   Temperature Pulse Blood Pressure Respirations SpO2 Patient Position - Orthostatic VS   98 °F (36.7 °C) 60 126/74 18 98 % Sitting      Temp Source Heart Rate Source BP Location FiO2 (%) Pain Score    Temporal Monitor Right arm -- --      Vitals      Date and Time Temp Pulse SpO2 Resp BP Pain Score FACES Pain Rating User   03/23/25 0509 98 °F (36.7 °C) 60 98 % 18 126/74 -- -- CK            Physical Exam  Vitals and nursing note reviewed.   Constitutional:       Appearance: She is well-developed.   HENT:      Head: Normocephalic and atraumatic.   Eyes:      Pupils: Pupils are equal, round, and reactive to light.   Cardiovascular:      Rate and Rhythm: Normal rate and regular rhythm.      Heart sounds: No murmur heard.     No friction rub. No gallop.   Pulmonary:      Effort: Pulmonary effort is normal.      Breath sounds: No wheezing or rales.   Chest:      Chest wall: No tenderness.   Abdominal:      General: There is no distension.       Palpations: Abdomen is soft. There is no mass.      Tenderness: There is no right CVA tenderness, left CVA tenderness, guarding or rebound.   Musculoskeletal:      Cervical back: Normal range of motion and neck supple.   Skin:     General: Skin is warm and dry.   Neurological:      Mental Status: She is alert and oriented to person, place, and time.         Results Reviewed       Procedure Component Value Units Date/Time    Urine Microscopic [[id number]]  (Abnormal) Collected: 03/23/25 0511    Lab Status: Final result Specimen: Urine, Clean Catch Updated: 03/23/25 0545     RBC, UA None Seen /hpf      WBC, UA 10-20 /hpf      Epithelial Cells Moderate /hpf      Bacteria, UA Moderate /hpf     Urine culture [[id number]] Collected: 03/23/25 0511    Lab Status: In process Specimen: Urine, Clean Catch Updated: 03/23/25 0545    POCT pregnancy, urine [[id number]]  (Normal) Collected: 03/23/25 0536    Lab Status: Final result Updated: 03/23/25 0537     EXT Preg Test, Ur Negative     Control Valid    UA w Reflex to Microscopic w Reflex to Culture [[id number]]  (Abnormal) Collected: 03/23/25 0511    Lab Status: Final result Specimen: Urine, Clean Catch Updated: 03/23/25 0533     Color, UA Yellow     Clarity, UA Hazy     Specific Gravity, UA 1.015     pH, UA 6.5     Leukocytes, UA Large     Nitrite, UA Negative     Protein, UA Negative mg/dl      Glucose, UA Negative mg/dl      Ketones, UA Negative mg/dl      Urobilinogen, UA <2.0 mg/dl      Bilirubin, UA Negative     Occult Blood, UA Negative            No orders to display       Procedures    ED Medication and Procedure Management   Prior to Admission Medications   Prescriptions Last Dose Informant Patient Reported? Taking?   SUMAtriptan (IMITREX) 100 mg tablet   No No   Sig: TAKE 1 TABLET (100 MG TOTAL) BY MOUTH ONCE AS NEEDED FOR MIGRAINE TAKE 1 AT HEADACHE ONSET AND THEN 1 ADDITIONAL TABLET 2 HOURS LATER IF NOT IMPROVED. MAX 2 PER 24 HOURS.   norethindrone (Ortho Micronor) 0.35  MG tablet   No No   Sig: Take 1 tablet (0.35 mg total) by mouth daily      Facility-Administered Medications: None     Patient's Medications   Discharge Prescriptions    CEPHALEXIN (KEFLEX) 500 MG CAPSULE    Take 1 capsule (500 mg total) by mouth every 12 (twelve) hours for 7 days       Start Date: 3/23/2025 End Date: 3/30/2025       Order Dose: 500 mg       Quantity: 14 capsule    Refills: 0    PHENAZOPYRIDINE (PYRIDIUM) 200 MG TABLET    Take 1 tablet (200 mg total) by mouth 3 (three) times a day with meals       Start Date: 3/23/2025 End Date: --       Order Dose: 200 mg       Quantity: 10 tablet    Refills: 0     No discharge procedures on file.  ED SEPSIS DOCUMENTATION   Time reflects when diagnosis was documented in both MDM as applicable and the Disposition within this note       Time User Action Codes Description Comment    3/23/2025  5:41 AM Aura Rucker [N39.0] Urinary tract infection                  Aura Rucker DO  03/23/25 0611

## 2025-03-26 ENCOUNTER — RESULTS FOLLOW-UP (OUTPATIENT)
Dept: EMERGENCY DEPT | Facility: HOSPITAL | Age: 35
End: 2025-03-26

## 2025-03-26 LAB
BACTERIA UR CULT: ABNORMAL
BACTERIA UR CULT: ABNORMAL

## 2025-03-26 RX ORDER — FLUCONAZOLE 200 MG/1
200 TABLET ORAL ONCE
Qty: 1 TABLET | Refills: 0 | Status: SHIPPED | OUTPATIENT
Start: 2025-03-26 | End: 2025-03-26

## 2025-03-28 ENCOUNTER — OFFICE VISIT (OUTPATIENT)
Dept: FAMILY MEDICINE CLINIC | Facility: CLINIC | Age: 35
End: 2025-03-28
Payer: COMMERCIAL

## 2025-03-28 VITALS
TEMPERATURE: 97.6 F | WEIGHT: 165.6 LBS | HEIGHT: 67 IN | DIASTOLIC BLOOD PRESSURE: 60 MMHG | RESPIRATION RATE: 16 BRPM | BODY MASS INDEX: 25.99 KG/M2 | OXYGEN SATURATION: 97 % | SYSTOLIC BLOOD PRESSURE: 110 MMHG | HEART RATE: 68 BPM

## 2025-03-28 DIAGNOSIS — R35.0 FREQUENCY OF URINATION: Primary | ICD-10-CM

## 2025-03-28 LAB
SL AMB  POCT GLUCOSE, UA: NORMAL
SL AMB LEUKOCYTE ESTERASE,UA: NORMAL
SL AMB POCT BILIRUBIN,UA: NORMAL
SL AMB POCT BLOOD,UA: NORMAL
SL AMB POCT CLARITY,UA: NORMAL
SL AMB POCT COLOR,UA: NORMAL
SL AMB POCT KETONES,UA: NORMAL
SL AMB POCT NITRITE,UA: NORMAL
SL AMB POCT PH,UA: 6
SL AMB POCT SPECIFIC GRAVITY,UA: 1.02
SL AMB POCT URINE PROTEIN: NORMAL
SL AMB POCT UROBILINOGEN: NORMAL

## 2025-03-28 PROCEDURE — 81002 URINALYSIS NONAUTO W/O SCOPE: CPT

## 2025-03-28 PROCEDURE — 99213 OFFICE O/P EST LOW 20 MIN: CPT

## 2025-03-28 NOTE — PROGRESS NOTES
Name: Meera Harvey      : 1990      MRN: [id number]  Encounter Provider: DANIEL Cuello  Encounter Date: 3/28/2025   Encounter department: [facility]  :  Assessment & Plan  Frequency of urination  Dip + blood. Pt has menses currently. Dip otherwise negative. Reviewed recent urine culture. Discussed w/pt. Likely yeast infection w/urinary symptoms. Symptoms are improving after tx with diflucan and cephalexin as ordered by  provider. Continue abx as directed. Continue increased fluids, good hygiene, cranberry supplements. Recommend f/u with GYN if symptoms return. Follow up as needed or at next regularly scheduled appointment. Declines printed AVS. Will view it on Revl.  Orders:  •  POCT urine dip           History of Present Illness {?Quick Links Encounters * My Last Note * Last Note in Specialty * Snapshot * Since Last Visit * History :[id number]}  Meera Harvey is a 34 y.o. year old female who presents today for follow up from an Urgent Care visit where she was dx with a UTI and BV. She finished the diflucan and is continuing to take the antibiotic. Reports symptoms are improving. Currently has menses. Stopped breastfeeding 3 weeks ago.         Urinary Tract Infection   Pertinent negatives include no chills, frequency, nausea, urgency or vomiting.     Review of Systems   Constitutional: Negative.  Negative for chills, fatigue and fever.   HENT: Negative.  Negative for congestion, ear pain, rhinorrhea and sore throat.    Eyes: Negative.  Negative for pain and visual disturbance.   Respiratory: Negative.  Negative for cough and shortness of breath.    Cardiovascular: Negative.  Negative for chest pain, palpitations and leg swelling.   Gastrointestinal:  Negative for abdominal pain, constipation, diarrhea, nausea and vomiting.   Endocrine: Negative.    Genitourinary: Negative.  Negative for dysuria, frequency and urgency.   Musculoskeletal: Negative.   "Negative for back pain and myalgias.   Skin: Negative.  Negative for rash.   Allergic/Immunologic: Negative.    Neurological: Negative.  Negative for dizziness, weakness, light-headedness and headaches.   Hematological: Negative.    Psychiatric/Behavioral: Negative.         Objective {?Quick Links Trend Vitals * Enter New Vitals * Results Review * Timeline (Adult) * Labs * Imaging * Cardiology * Procedures * Lung Cancer Screening * Surgical eConsent :[id number]}  /60 (BP Location: Left arm, Patient Position: Sitting, Cuff Size: Standard)   Pulse 68   Temp 97.6 °F (36.4 °C) (Tympanic)   Resp 16   Ht 5' 7\" (1.702 m)   Wt 75.1 kg (165 lb 9.6 oz)   SpO2 97%   BMI 25.94 kg/m²      Physical Exam  Vitals and nursing note reviewed.   Constitutional:       General: She is not in acute distress.     Appearance: Normal appearance. She is not ill-appearing.   HENT:      Head: Normocephalic and atraumatic.   Cardiovascular:      Rate and Rhythm: Normal rate and regular rhythm.      Heart sounds: Normal heart sounds. No murmur heard.  Pulmonary:      Effort: Pulmonary effort is normal. No respiratory distress.      Breath sounds: Normal breath sounds. No wheezing.   Abdominal:      General: Abdomen is flat. Bowel sounds are normal.      Palpations: Abdomen is soft.      Tenderness: There is no right CVA tenderness or left CVA tenderness.   Musculoskeletal:         General: Normal range of motion.      Cervical back: Normal range of motion.   Skin:     General: Skin is warm and dry.      Capillary Refill: Capillary refill takes less than 2 seconds.   Neurological:      General: No focal deficit present.      Mental Status: She is alert and oriented to person, place, and time.   Psychiatric:         Mood and Affect: Mood normal.         Behavior: Behavior normal.         "

## 2025-04-04 ENCOUNTER — OFFICE VISIT (OUTPATIENT)
Dept: OBGYN CLINIC | Facility: CLINIC | Age: 35
End: 2025-04-04
Payer: COMMERCIAL

## 2025-04-04 ENCOUNTER — NURSE TRIAGE (OUTPATIENT)
Dept: OTHER | Facility: OTHER | Age: 35
End: 2025-04-04

## 2025-04-04 VITALS
DIASTOLIC BLOOD PRESSURE: 64 MMHG | SYSTOLIC BLOOD PRESSURE: 94 MMHG | WEIGHT: 166 LBS | HEIGHT: 68 IN | BODY MASS INDEX: 25.16 KG/M2

## 2025-04-04 DIAGNOSIS — N89.8 VAGINAL DISCHARGE: ICD-10-CM

## 2025-04-04 DIAGNOSIS — R30.0 BURNING WITH URINATION: Primary | ICD-10-CM

## 2025-04-04 LAB
BV WHIFF TEST VAG QL: ABNORMAL
CLUE CELLS SPEC QL WET PREP: ABNORMAL
PH SMN: 4 [PH]
SL AMB LEUKOCYTE ESTERASE,UA: ABNORMAL
SL AMB POCT BLOOD,UA: ABNORMAL
SL AMB POCT CLARITY,UA: CLEAR
SL AMB POCT COLOR,UA: YELLOW
T VAGINALIS VAG QL WET PREP: ABNORMAL
YEAST VAG QL WET PREP: ABNORMAL

## 2025-04-04 PROCEDURE — 87480 CANDIDA DNA DIR PROBE: CPT | Performed by: OBSTETRICS & GYNECOLOGY

## 2025-04-04 PROCEDURE — 87210 SMEAR WET MOUNT SALINE/INK: CPT | Performed by: OBSTETRICS & GYNECOLOGY

## 2025-04-04 PROCEDURE — 87510 GARDNER VAG DNA DIR PROBE: CPT | Performed by: OBSTETRICS & GYNECOLOGY

## 2025-04-04 PROCEDURE — 87086 URINE CULTURE/COLONY COUNT: CPT | Performed by: OBSTETRICS & GYNECOLOGY

## 2025-04-04 PROCEDURE — 99213 OFFICE O/P EST LOW 20 MIN: CPT | Performed by: OBSTETRICS & GYNECOLOGY

## 2025-04-04 PROCEDURE — 81002 URINALYSIS NONAUTO W/O SCOPE: CPT | Performed by: OBSTETRICS & GYNECOLOGY

## 2025-04-04 PROCEDURE — 87660 TRICHOMONAS VAGIN DIR PROBE: CPT | Performed by: OBSTETRICS & GYNECOLOGY

## 2025-04-04 RX ORDER — FLUCONAZOLE 150 MG/1
TABLET ORAL
Qty: 3 TABLET | Refills: 0 | Status: SHIPPED | OUTPATIENT
Start: 2025-04-04 | End: 2025-04-14

## 2025-04-04 NOTE — TELEPHONE ENCOUNTER
"FOLLOW UP: Please call patient to schedule an appointment.    REASON FOR CONVERSATION: Vaginal Itching    SYMPTOMS: 7/10 vulvar pain, itchiness, redness 1 weak after being treated for a UTI and yeast infection.     OTHER: Home care advice provided and call back precautions reviewed. Patient verbalized understanding.    DISPOSITION: See PCP Within 24 Hours      Reason for Disposition   MODERATE-SEVERE itching (i.e., interferes with school, work, or sleep)    Answer Assessment - Initial Assessment Questions  1. SYMPTOM: \"What's the main symptom you're concerned about?\" (e.g., rash, itching, swelling, dryness)        Pain, itching, redness    2. LOCATION: \"Where is the  pain located?\" (e.g., inside/outside, left/right)        Both sides    3. ONSET: \"When did the  pain  start?\"        Last week patient was seen in [facility] for a possible UTI. She was started on po keflex and when culture came back took 1 dose of diflucan. She reports mild improvement a couple days ago but now pain is unbearable.    4. PAIN: \"Is there any pain?\" If Yes, ask: \"How bad is it?\" (Scale: 1-10; mild, moderate, severe)        7/10. \"Constantly uncomfortable and painful, interfering with sleep\".    5. CAUSE: \"What do you think is causing the symptoms?\"        Unsure    6. OTHER SYMPTOMS: \"Do you have any other symptoms?\" (e.g., fever, vaginal bleeding, pain with urination)        Denies pain with urination, vaginal discharge, fever.    7. PREGNANCY: \"Is there any chance you are pregnant?\" \"When was your last menstrual period?\"        Denies. First menstrual cycle last week since delivering      Stopped breastfeeding a month ago    Protocols used: Vulvar Symptoms-Adult-    "

## 2025-04-04 NOTE — PROGRESS NOTES
[facility] [place]  [address], [address], [place] [address]    Assessment & Plan  Vaginal discharge  Vulvar burning and vaginal discharge this week after period stopped and treated with Keflex x 7 days and Diflucan x 1 dose for urine which showed lactobaccilli and yeast.  Urinary symptoms improved than vulvar burning a couple days ago and vaginal discharge yellow noted today. Burning upper labia and worsening.  On exam mild erythema labia, vaginal discharge likely yeast.  On Wet mount, yeast noted.    Discussed possible did not fully treat yeast due to concurrent Abx use.  Sent molecular test for confirmation and will put on Diflucan 3 doses over 8 days.  Will msg with results.  Aquafor to labia for burning and irritation.    Orders:    Molecular Vaginal Panel    fluconazole (DIFLUCAN) 150 mg tablet; Take 1 tablet, repeat in 3 days, if still symptoms after additional 3 days take last tab    Burning with urination  Recent UTI treated - seen in ER and urine with yeast and lactobaccillus.  Was given Keflex 7 days and Diflucan x 1.  Symptoms improved and then vaginal irritation after period stopped.    U/A here with blood and LE - will send for culture.    Orders:    POCT urine dip    POCT wet mount    Urine culture      I have spent a total time of 25 minutes in caring for this patient on the day of the visit/encounter including Prognosis, Risks and benefits of tx options, Instructions for management, Patient and family education, Impressions, Counseling / Coordination of care, Documenting in the medical record, Reviewing/placing orders in the medical record (including tests, medications, and/or procedures), and Obtaining or reviewing history  .      Subjective:   Meera Harvey is a 34 y.o.  female.    HPI:   Meera today for burning vulvar area and discharge.    She had bladder pressure and was seen in ER 3/23/2025.  Suspected UTI and started on Keflex.  Urine culture then  "returned (in Epic) >100,000 cfu/ml Lactobacillus species and  <10,000 cfu/ml Candida albicans.    She was called and told continue Keflex (7 day course) and given Diflucan 200mg x 1.  Symptoms improved and then got first period since stopped nursing 3/25/2025.  Normal period.  Ended 3/31/2025.    This week started vulvar irritation, burning and \"looks red\".  Then today yellow discharge.  No new partners.        Gyn History  Patient's last menstrual period was 2025 (exact date).       Last pap smear: 2022    She  reports being sexually active and has had partner(s) who are male. She reports using the following method of birth control/protection: OCP.       OB History      Past Medical History:  No date: Abnormal Pap smear of cervix      Comment:  just follow up [id number] abnormal since  2024: Arthralgia of hip  2024: Arthralgia of knee  2024: Cervical intraepithelial neoplasia grade 1      Comment:  Sep 23, 2013 Entered By: AMANDA TRAMMELL Comment: pap                2024: Dry eye syndrome  1990: Ear problems      Comment:  Multiple ear tubes as child  2024: Head injury  2011: Headache(784.0)  2024: Hematuria syndrome  No date: Migraine      Comment:  seasonal-food related-last episode 2023. No neuro work               up. Resolve with tylenol/advil  2011: Nasal congestion  2011: Sinusitis  No date: UTI (urinary tract infection)      Comment:  Last episode   No date: Varicella      Comment:  chicken pox as child     No past surgical history on file.     Social History     Tobacco Use    Smoking status: Never     Passive exposure: Never    Smokeless tobacco: Never   Vaping Use    Vaping status: Never Used   Substance Use Topics    Alcohol use: Not Currently    Drug use: Never          Current Outpatient Medications:     norethindrone (Ortho Micronor) 0.35 MG tablet, Take 1 tablet (0.35 mg total) by mouth daily, Disp: 84 " "tablet, Rfl: 4    SUMAtriptan (IMITREX) 100 mg tablet, TAKE 1 TABLET (100 MG TOTAL) BY MOUTH ONCE AS NEEDED FOR MIGRAINE TAKE 1 AT HEADACHE ONSET AND THEN 1 ADDITIONAL TABLET 2 HOURS LATER IF NOT IMPROVED. MAX 2 PER 24 HOURS., Disp: 12 tablet, Rfl: 1    She has no known allergies..    ROS: Review of Systems   Constitutional: Negative.    Gastrointestinal: Negative.    Genitourinary:  Positive for vaginal discharge and vaginal pain. Negative for menstrual problem, pelvic pain and vaginal bleeding.   Psychiatric/Behavioral: Negative.         Objective:  BP 94/64 (BP Location: Left arm, Patient Position: Sitting, Cuff Size: Standard)   Ht 5' 8\" (1.727 m)   Wt 75.3 kg (166 lb)   LMP 03/25/2025 (Exact Date)   BMI 25.24 kg/m²      Physical Exam  Constitutional:       Appearance: Normal appearance.   Genitourinary:     General: Normal vulva.      Vagina: Vaginal discharge (white yellow) present.      Cervix: Normal.      Uterus: Normal. Not enlarged and not tender.       Adnexa:         Right: No mass or tenderness.          Left: No mass or tenderness.        Rectum: No external hemorrhoid.      Comments: Mild erythema labia minora and medial labia majora, no lesions  Neurological:      Mental Status: She is alert.   Psychiatric:         Mood and Affect: Mood normal.         Behavior: Behavior normal.         "

## 2025-04-06 ENCOUNTER — RESULTS FOLLOW-UP (OUTPATIENT)
Dept: LABOR AND DELIVERY | Facility: HOSPITAL | Age: 35
End: 2025-04-06

## 2025-04-06 DIAGNOSIS — N76.0 BACTERIAL VAGINOSIS: Primary | ICD-10-CM

## 2025-04-06 DIAGNOSIS — B96.89 BACTERIAL VAGINOSIS: Primary | ICD-10-CM

## 2025-04-06 LAB — BACTERIA UR CULT: NORMAL

## 2025-04-07 ENCOUNTER — TELEPHONE (OUTPATIENT)
Dept: LAB | Facility: HOSPITAL | Age: 35
End: 2025-04-07

## 2025-04-07 LAB
CANDIDA RRNA VAG QL PROBE: NOT DETECTED
G VAGINALIS RRNA GENITAL QL PROBE: DETECTED
T VAGINALIS RRNA GENITAL QL PROBE: NOT DETECTED

## 2025-04-08 RX ORDER — METRONIDAZOLE 500 MG/1
500 TABLET ORAL EVERY 12 HOURS
Qty: 14 TABLET | Refills: 0 | Status: SHIPPED | OUTPATIENT
Start: 2025-04-08 | End: 2025-04-15

## 2025-07-10 ENCOUNTER — OFFICE VISIT (OUTPATIENT)
Dept: FAMILY MEDICINE CLINIC | Facility: CLINIC | Age: 35
End: 2025-07-10
Payer: COMMERCIAL

## 2025-07-10 ENCOUNTER — APPOINTMENT (OUTPATIENT)
Dept: RADIOLOGY | Facility: CLINIC | Age: 35
End: 2025-07-10
Payer: COMMERCIAL

## 2025-07-10 VITALS
WEIGHT: 166.8 LBS | OXYGEN SATURATION: 98 % | TEMPERATURE: 97.5 F | SYSTOLIC BLOOD PRESSURE: 122 MMHG | RESPIRATION RATE: 15 BRPM | BODY MASS INDEX: 25.28 KG/M2 | HEIGHT: 68 IN | DIASTOLIC BLOOD PRESSURE: 78 MMHG | HEART RATE: 61 BPM

## 2025-07-10 DIAGNOSIS — M79.674 PAIN OF TOE OF RIGHT FOOT: Primary | ICD-10-CM

## 2025-07-10 DIAGNOSIS — M79.674 PAIN OF TOE OF RIGHT FOOT: ICD-10-CM

## 2025-07-10 PROCEDURE — 99213 OFFICE O/P EST LOW 20 MIN: CPT

## 2025-07-10 PROCEDURE — 73630 X-RAY EXAM OF FOOT: CPT

## 2025-07-10 NOTE — PROGRESS NOTES
Name: Meera Harvey      : 1990      MRN: [id number]  Encounter Provider: DANIEL Cuello  Encounter Date: 7/10/2025   Encounter department: [facility]  :  Assessment & Plan  Pain of toe of right foot  X-ray ordered. If fx, will refer to Podiatry. If normal imaging, continue supportive care measures: rest, ice, elevation and f/u if symptoms worsen of fail to improve in 4-6 weeks. If no improvement will repeat imaging at that time.  Orders:  •  XR foot 3+ vw right; Future      Follow up as needed or if symptoms worsen or fail to improve. Declines printed AVS.        History of Present Illness   Meera Harvey is a 35 y.o. year old female who presents today with a concern of a bruised right pinky toe. Dog dropped a bone on her toe. There is bruising and swelling. Was very painful initially and is still uncomfortable.         Review of Systems   Constitutional: Negative.  Negative for chills, fatigue and fever.   HENT: Negative.  Negative for congestion, ear pain, rhinorrhea and sore throat.    Eyes: Negative.  Negative for pain and visual disturbance.   Respiratory: Negative.  Negative for cough and shortness of breath.    Cardiovascular: Negative.  Negative for chest pain, palpitations and leg swelling.   Gastrointestinal:  Negative for abdominal pain, constipation, diarrhea, nausea and vomiting.   Endocrine: Negative.    Genitourinary: Negative.  Negative for dysuria, frequency and urgency.   Musculoskeletal:  Positive for arthralgias. Negative for back pain and myalgias.   Skin:  Positive for color change. Negative for rash.        bruising   Allergic/Immunologic: Negative.    Neurological: Negative.  Negative for dizziness, weakness, light-headedness and headaches.   Hematological: Negative.    Psychiatric/Behavioral: Negative.         Objective   /78 (BP Location: Left arm, Patient Position: Sitting, Cuff Size: Standard)   Pulse 61   Temp 97.5 °F  "(36.4 °C) (Tympanic)   Resp 15   Ht 5' 8\" (1.727 m)   Wt 75.7 kg (166 lb 12.8 oz)   SpO2 98%   BMI 25.36 kg/m²      Physical Exam  Vitals and nursing note reviewed.   Constitutional:       General: She is not in acute distress.     Appearance: Normal appearance. She is not ill-appearing.   HENT:      Head: Normocephalic and atraumatic.      Right Ear: External ear normal.      Left Ear: External ear normal.      Nose: Nose normal.      Mouth/Throat:      Mouth: Mucous membranes are moist.      Pharynx: Oropharynx is clear.     Eyes:      Extraocular Movements: Extraocular movements intact.      Conjunctiva/sclera: Conjunctivae normal.      Pupils: Pupils are equal, round, and reactive to light.       Cardiovascular:      Rate and Rhythm: Normal rate and regular rhythm.      Pulses:           Dorsalis pedis pulses are 2+ on the right side and 2+ on the left side.        Posterior tibial pulses are 2+ on the right side and 2+ on the left side.      Heart sounds: Normal heart sounds. No murmur heard.  Pulmonary:      Effort: Pulmonary effort is normal. No respiratory distress.      Breath sounds: Normal breath sounds. No wheezing.   Abdominal:      General: Abdomen is flat. Bowel sounds are normal.      Palpations: Abdomen is soft.      Tenderness: There is no abdominal tenderness.     Musculoskeletal:      Cervical back: Normal range of motion.      Right foot: Decreased range of motion. Normal capillary refill. Swelling and tenderness present. No laceration, bony tenderness or crepitus. Normal pulse.        Feet:    Feet:      Right foot:      Skin integrity: Skin integrity normal. No erythema or warmth.     Skin:     General: Skin is warm and dry.      Capillary Refill: Capillary refill takes less than 2 seconds.     Neurological:      General: No focal deficit present.      Mental Status: She is alert and oriented to person, place, and time.     Psychiatric:         Mood and Affect: Mood normal.         " Behavior: Behavior normal.         Thought Content: Thought content normal.

## 2025-08-06 ENCOUNTER — OFFICE VISIT (OUTPATIENT)
Dept: FAMILY MEDICINE CLINIC | Facility: CLINIC | Age: 35
End: 2025-08-06
Payer: COMMERCIAL

## 2025-08-06 VITALS
BODY MASS INDEX: 24.38 KG/M2 | HEIGHT: 69 IN | RESPIRATION RATE: 16 BRPM | DIASTOLIC BLOOD PRESSURE: 80 MMHG | OXYGEN SATURATION: 98 % | TEMPERATURE: 97.7 F | HEART RATE: 65 BPM | WEIGHT: 164.6 LBS | SYSTOLIC BLOOD PRESSURE: 122 MMHG

## 2025-08-06 DIAGNOSIS — R23.3 BRUISES EASILY: ICD-10-CM

## 2025-08-06 DIAGNOSIS — Z13.6 SCREENING FOR CARDIOVASCULAR CONDITION: ICD-10-CM

## 2025-08-06 DIAGNOSIS — Z13.1 SCREENING FOR DIABETES MELLITUS: ICD-10-CM

## 2025-08-06 DIAGNOSIS — Z00.00 ANNUAL PHYSICAL EXAM: Primary | ICD-10-CM

## 2025-08-06 PROCEDURE — 99395 PREV VISIT EST AGE 18-39: CPT
